# Patient Record
Sex: MALE | Race: WHITE | NOT HISPANIC OR LATINO | Employment: FULL TIME | URBAN - METROPOLITAN AREA
[De-identification: names, ages, dates, MRNs, and addresses within clinical notes are randomized per-mention and may not be internally consistent; named-entity substitution may affect disease eponyms.]

---

## 2022-04-11 ENCOUNTER — HISTORICAL (OUTPATIENT)
Dept: ADMINISTRATIVE | Facility: HOSPITAL | Age: 47
End: 2022-04-11

## 2022-04-24 VITALS
DIASTOLIC BLOOD PRESSURE: 91 MMHG | HEIGHT: 75 IN | SYSTOLIC BLOOD PRESSURE: 142 MMHG | WEIGHT: 229.5 LBS | BODY MASS INDEX: 28.54 KG/M2

## 2023-09-23 ENCOUNTER — HOSPITAL ENCOUNTER (INPATIENT)
Facility: HOSPITAL | Age: 48
LOS: 23 days | Discharge: HOME | DRG: 956 | End: 2023-10-16
Attending: STUDENT IN AN ORGANIZED HEALTH CARE EDUCATION/TRAINING PROGRAM | Admitting: SURGERY
Payer: MEDICAID

## 2023-09-23 DIAGNOSIS — R52 PAIN: ICD-10-CM

## 2023-09-23 DIAGNOSIS — M25.531 RIGHT WRIST PAIN: ICD-10-CM

## 2023-09-23 DIAGNOSIS — S72.031A CLOSED DISPLACED MIDCERVICAL FRACTURE OF RIGHT FEMUR, INITIAL ENCOUNTER: Primary | ICD-10-CM
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
## 2023-09-23 DIAGNOSIS — S92.134A CLOSED NONDISPLACED FRACTURE OF POSTERIOR PROCESS OF RIGHT TALUS, INITIAL ENCOUNTER: ICD-10-CM

## 2023-09-23 DIAGNOSIS — S72.91XA CLOSED FRACTURE OF RIGHT FEMUR, UNSPECIFIED FRACTURE MORPHOLOGY, UNSPECIFIED PORTION OF FEMUR, INITIAL ENCOUNTER: ICD-10-CM

## 2023-09-23 DIAGNOSIS — S92.001A CLOSED NONDISPLACED FRACTURE OF RIGHT CALCANEUS, UNSPECIFIED PORTION OF CALCANEUS, INITIAL ENCOUNTER: ICD-10-CM

## 2023-09-23 DIAGNOSIS — M48.061 SPINAL STENOSIS OF LUMBAR REGION, UNSPECIFIED WHETHER NEUROGENIC CLAUDICATION PRESENT: ICD-10-CM

## 2023-09-23 DIAGNOSIS — S09.90XA CLOSED HEAD INJURY, INITIAL ENCOUNTER: ICD-10-CM

## 2023-09-23 DIAGNOSIS — M25.551 RIGHT HIP PAIN: ICD-10-CM

## 2023-09-23 DIAGNOSIS — S06.4XAA EPIDURAL HEMATOMA: ICD-10-CM

## 2023-09-23 DIAGNOSIS — S72.001A CLOSED FRACTURE OF RIGHT HIP, INITIAL ENCOUNTER: ICD-10-CM

## 2023-09-23 DIAGNOSIS — T14.90XA TRAUMA: ICD-10-CM

## 2023-09-23 DIAGNOSIS — S92.121A DISPLACED FRACTURE OF BODY OF RIGHT TALUS, INITIAL ENCOUNTER FOR CLOSED FRACTURE: ICD-10-CM

## 2023-09-23 DIAGNOSIS — R10.84 GENERALIZED ABDOMINAL PAIN: ICD-10-CM

## 2023-09-23 DIAGNOSIS — S32.032A: ICD-10-CM

## 2023-09-23 DIAGNOSIS — M25.571 ACUTE RIGHT ANKLE PAIN: ICD-10-CM

## 2023-09-23 PROBLEM — S32.039A CLOSED FRACTURE OF THIRD LUMBAR VERTEBRA: Status: ACTIVE | Noted: 2023-09-23

## 2023-09-23 PROBLEM — S52.90XA RADIUS FRACTURE: Status: ACTIVE | Noted: 2023-09-23

## 2023-09-23 LAB
ALBUMIN SERPL-MCNC: 4.3 G/DL (ref 3.5–5)
ALBUMIN/GLOB SERPL: 1.9 RATIO (ref 1.1–2)
ALP SERPL-CCNC: 58 UNIT/L (ref 40–150)
ALT SERPL-CCNC: 33 UNIT/L (ref 0–55)
APTT PPP: 27.5 SECONDS (ref 23.2–33.7)
AST SERPL-CCNC: 50 UNIT/L (ref 5–34)
BASOPHILS # BLD AUTO: 0.04 X10(3)/MCL
BASOPHILS NFR BLD AUTO: 0.2 %
BILIRUB SERPL-MCNC: 0.8 MG/DL
BUN SERPL-MCNC: 9.5 MG/DL (ref 8.9–20.6)
CALCIUM SERPL-MCNC: 8.8 MG/DL (ref 8.4–10.2)
CHLORIDE SERPL-SCNC: 107 MMOL/L (ref 98–107)
CO2 SERPL-SCNC: 18 MMOL/L (ref 22–29)
CREAT SERPL-MCNC: 0.69 MG/DL (ref 0.73–1.18)
EOSINOPHIL # BLD AUTO: 0 X10(3)/MCL (ref 0–0.9)
EOSINOPHIL NFR BLD AUTO: 0 %
ERYTHROCYTE [DISTWIDTH] IN BLOOD BY AUTOMATED COUNT: 13.3 % (ref 11.5–17)
GFR SERPLBLD CREATININE-BSD FMLA CKD-EPI: >60 MLS/MIN/1.73/M2
GLOBULIN SER-MCNC: 2.3 GM/DL (ref 2.4–3.5)
GLUCOSE SERPL-MCNC: 83 MG/DL (ref 74–100)
GROUP & RH: NORMAL
HCT VFR BLD AUTO: 42.1 % (ref 42–52)
HGB BLD-MCNC: 14.5 G/DL (ref 14–18)
IMM GRANULOCYTES # BLD AUTO: 0.14 X10(3)/MCL (ref 0–0.04)
IMM GRANULOCYTES NFR BLD AUTO: 0.7 %
INDIRECT COOMBS GEL: NORMAL
INR PPP: 1
LACTATE SERPL-SCNC: 0.6 MMOL/L (ref 0.5–2.2)
LACTATE SERPL-SCNC: 0.7 MMOL/L (ref 0.5–2.2)
LACTATE SERPL-SCNC: 0.7 MMOL/L (ref 0.5–2.2)
LYMPHOCYTES # BLD AUTO: 1 X10(3)/MCL (ref 0.6–4.6)
LYMPHOCYTES NFR BLD AUTO: 5.3 %
MCH RBC QN AUTO: 30.4 PG (ref 27–31)
MCHC RBC AUTO-ENTMCNC: 34.4 G/DL (ref 33–36)
MCV RBC AUTO: 88.3 FL (ref 80–94)
MONOCYTES # BLD AUTO: 1.28 X10(3)/MCL (ref 0.1–1.3)
MONOCYTES NFR BLD AUTO: 6.7 %
NEUTROPHILS # BLD AUTO: 16.51 X10(3)/MCL (ref 2.1–9.2)
NEUTROPHILS NFR BLD AUTO: 87.1 %
NRBC BLD AUTO-RTO: 0 %
PLATELET # BLD AUTO: 243 X10(3)/MCL (ref 130–400)
PMV BLD AUTO: 10 FL (ref 7.4–10.4)
POTASSIUM SERPL-SCNC: 4 MMOL/L (ref 3.5–5.1)
PROT SERPL-MCNC: 6.6 GM/DL (ref 6.4–8.3)
PROTHROMBIN TIME: 13.4 SECONDS (ref 12.5–14.5)
RBC # BLD AUTO: 4.77 X10(6)/MCL (ref 4.7–6.1)
SODIUM SERPL-SCNC: 138 MMOL/L (ref 136–145)
SPECIMEN OUTDATE: NORMAL
WBC # SPEC AUTO: 18.97 X10(3)/MCL (ref 4.5–11.5)

## 2023-09-23 PROCEDURE — 63600175 PHARM REV CODE 636 W HCPCS: Performed by: SURGERY

## 2023-09-23 PROCEDURE — 25000003 PHARM REV CODE 250: Performed by: NURSE PRACTITIONER

## 2023-09-23 PROCEDURE — 86900 BLOOD TYPING SEROLOGIC ABO: CPT | Performed by: NEUROLOGICAL SURGERY

## 2023-09-23 PROCEDURE — 96374 THER/PROPH/DIAG INJ IV PUSH: CPT

## 2023-09-23 PROCEDURE — 20000000 HC ICU ROOM

## 2023-09-23 PROCEDURE — 63600175 PHARM REV CODE 636 W HCPCS

## 2023-09-23 PROCEDURE — 85610 PROTHROMBIN TIME: CPT | Performed by: NEUROLOGICAL SURGERY

## 2023-09-23 PROCEDURE — 83605 ASSAY OF LACTIC ACID: CPT | Performed by: NURSE PRACTITIONER

## 2023-09-23 PROCEDURE — 85025 COMPLETE CBC W/AUTO DIFF WBC: CPT | Performed by: STUDENT IN AN ORGANIZED HEALTH CARE EDUCATION/TRAINING PROGRAM

## 2023-09-23 PROCEDURE — 85730 THROMBOPLASTIN TIME PARTIAL: CPT | Performed by: NEUROLOGICAL SURGERY

## 2023-09-23 PROCEDURE — 25000003 PHARM REV CODE 250: Performed by: NEUROLOGICAL SURGERY

## 2023-09-23 PROCEDURE — 99291 CRITICAL CARE FIRST HOUR: CPT | Mod: ,,, | Performed by: SURGERY

## 2023-09-23 PROCEDURE — 99223 PR INITIAL HOSPITAL CARE,LEVL III: ICD-10-PCS | Mod: ,,, | Performed by: NEUROLOGICAL SURGERY

## 2023-09-23 PROCEDURE — 63600175 PHARM REV CODE 636 W HCPCS: Performed by: STUDENT IN AN ORGANIZED HEALTH CARE EDUCATION/TRAINING PROGRAM

## 2023-09-23 PROCEDURE — 25000003 PHARM REV CODE 250: Performed by: SURGERY

## 2023-09-23 PROCEDURE — 99223 1ST HOSP IP/OBS HIGH 75: CPT | Mod: ,,, | Performed by: NEUROLOGICAL SURGERY

## 2023-09-23 PROCEDURE — 80053 COMPREHEN METABOLIC PANEL: CPT | Performed by: STUDENT IN AN ORGANIZED HEALTH CARE EDUCATION/TRAINING PROGRAM

## 2023-09-23 PROCEDURE — 99291 CRITICAL CARE FIRST HOUR: CPT

## 2023-09-23 PROCEDURE — 99291 PR CRITICAL CARE, E/M 30-74 MINUTES: ICD-10-PCS | Mod: ,,, | Performed by: SURGERY

## 2023-09-23 PROCEDURE — 63600175 PHARM REV CODE 636 W HCPCS: Performed by: NURSE PRACTITIONER

## 2023-09-23 RX ORDER — MUPIROCIN 20 MG/G
OINTMENT TOPICAL 2 TIMES DAILY
Status: DISPENSED | OUTPATIENT
Start: 2023-09-23 | End: 2023-09-28

## 2023-09-23 RX ORDER — MORPHINE SULFATE 4 MG/ML
4 INJECTION, SOLUTION INTRAMUSCULAR; INTRAVENOUS EVERY 4 HOURS PRN
Status: DISCONTINUED | OUTPATIENT
Start: 2023-09-23 | End: 2023-09-23

## 2023-09-23 RX ORDER — METHOCARBAMOL 500 MG/1
500 TABLET, FILM COATED ORAL EVERY 8 HOURS
Status: DISCONTINUED | OUTPATIENT
Start: 2023-09-23 | End: 2023-09-23

## 2023-09-23 RX ORDER — METHOCARBAMOL 100 MG/ML
1000 INJECTION, SOLUTION INTRAMUSCULAR; INTRAVENOUS EVERY 8 HOURS
Status: DISCONTINUED | OUTPATIENT
Start: 2023-09-23 | End: 2023-09-23 | Stop reason: DRUGHIGH

## 2023-09-23 RX ORDER — OXYCODONE HYDROCHLORIDE 5 MG/1
5 TABLET ORAL EVERY 4 HOURS PRN
Status: DISCONTINUED | OUTPATIENT
Start: 2023-09-23 | End: 2023-09-23

## 2023-09-23 RX ORDER — ONDANSETRON 2 MG/ML
INJECTION INTRAMUSCULAR; INTRAVENOUS
Status: COMPLETED
Start: 2023-09-23 | End: 2023-09-23

## 2023-09-23 RX ORDER — MORPHINE SULFATE 4 MG/ML
2 INJECTION, SOLUTION INTRAMUSCULAR; INTRAVENOUS
Status: DISCONTINUED | OUTPATIENT
Start: 2023-09-23 | End: 2023-09-23

## 2023-09-23 RX ORDER — TALC
6 POWDER (GRAM) TOPICAL NIGHTLY PRN
Status: DISCONTINUED | OUTPATIENT
Start: 2023-09-23 | End: 2023-10-02

## 2023-09-23 RX ORDER — BISACODYL 10 MG
10 SUPPOSITORY, RECTAL RECTAL DAILY PRN
Status: DISCONTINUED | OUTPATIENT
Start: 2023-09-23 | End: 2023-10-01

## 2023-09-23 RX ORDER — FERROUS SULFATE, DRIED 160(50) MG
1 TABLET, EXTENDED RELEASE ORAL 2 TIMES DAILY
Status: DISCONTINUED | OUTPATIENT
Start: 2023-09-23 | End: 2023-10-02

## 2023-09-23 RX ORDER — METHOCARBAMOL 100 MG/ML
500 INJECTION, SOLUTION INTRAMUSCULAR; INTRAVENOUS EVERY 8 HOURS
Status: DISCONTINUED | OUTPATIENT
Start: 2023-09-23 | End: 2023-09-25

## 2023-09-23 RX ORDER — DOCUSATE SODIUM 100 MG/1
100 CAPSULE, LIQUID FILLED ORAL 2 TIMES DAILY
Status: DISCONTINUED | OUTPATIENT
Start: 2023-09-23 | End: 2023-09-28

## 2023-09-23 RX ORDER — DEXTROAMPHETAMINE SACCHARATE, AMPHETAMINE ASPARTATE, DEXTROAMPHETAMINE SULFATE AND AMPHETAMINE SULFATE 7.5; 7.5; 7.5; 7.5 MG/1; MG/1; MG/1; MG/1
1 TABLET ORAL DAILY
COMMUNITY
End: 2023-11-09

## 2023-09-23 RX ORDER — PANTOPRAZOLE SODIUM 40 MG/1
1 TABLET, DELAYED RELEASE ORAL DAILY
COMMUNITY
End: 2023-11-09

## 2023-09-23 RX ORDER — ACETAMINOPHEN 325 MG/1
650 TABLET ORAL EVERY 4 HOURS
Status: DISPENSED | OUTPATIENT
Start: 2023-09-23 | End: 2023-09-28

## 2023-09-23 RX ORDER — MORPHINE SULFATE 4 MG/ML
4 INJECTION, SOLUTION INTRAMUSCULAR; INTRAVENOUS
Status: DISCONTINUED | OUTPATIENT
Start: 2023-09-23 | End: 2023-09-28

## 2023-09-23 RX ORDER — MORPHINE SULFATE 4 MG/ML
INJECTION, SOLUTION INTRAMUSCULAR; INTRAVENOUS
Status: COMPLETED
Start: 2023-09-23 | End: 2023-09-23

## 2023-09-23 RX ORDER — FAMOTIDINE 20 MG/1
20 TABLET, FILM COATED ORAL 2 TIMES DAILY
Status: DISCONTINUED | OUTPATIENT
Start: 2023-09-23 | End: 2023-09-24

## 2023-09-23 RX ORDER — OXYCODONE HYDROCHLORIDE 10 MG/1
10 TABLET ORAL EVERY 4 HOURS PRN
Status: DISCONTINUED | OUTPATIENT
Start: 2023-09-23 | End: 2023-10-02

## 2023-09-23 RX ORDER — POLYETHYLENE GLYCOL 3350 17 G/17G
17 POWDER, FOR SOLUTION ORAL 2 TIMES DAILY
Status: DISCONTINUED | OUTPATIENT
Start: 2023-09-23 | End: 2023-09-28

## 2023-09-23 RX ORDER — HYDROMORPHONE HYDROCHLORIDE 2 MG/ML
1 INJECTION, SOLUTION INTRAMUSCULAR; INTRAVENOUS; SUBCUTANEOUS
Status: COMPLETED | OUTPATIENT
Start: 2023-09-23 | End: 2023-09-23

## 2023-09-23 RX ORDER — SODIUM CHLORIDE 9 MG/ML
INJECTION, SOLUTION INTRAVENOUS CONTINUOUS
Status: DISCONTINUED | OUTPATIENT
Start: 2023-09-23 | End: 2023-09-24

## 2023-09-23 RX ORDER — MORPHINE SULFATE 4 MG/ML
4 INJECTION, SOLUTION INTRAMUSCULAR; INTRAVENOUS
Status: DISCONTINUED | OUTPATIENT
Start: 2023-09-23 | End: 2023-09-23

## 2023-09-23 RX ADMIN — DOCUSATE SODIUM 100 MG: 100 CAPSULE, LIQUID FILLED ORAL at 08:09

## 2023-09-23 RX ADMIN — ONDANSETRON 4 MG: 2 INJECTION INTRAMUSCULAR; INTRAVENOUS at 11:09

## 2023-09-23 RX ADMIN — SODIUM CHLORIDE: 9 INJECTION, SOLUTION INTRAVENOUS at 03:09

## 2023-09-23 RX ADMIN — OXYCODONE HYDROCHLORIDE 10 MG: 10 TABLET ORAL at 08:09

## 2023-09-23 RX ADMIN — MORPHINE SULFATE 4 MG: 4 INJECTION INTRAVENOUS at 11:09

## 2023-09-23 RX ADMIN — METHOCARBAMOL 1000 MG: 100 INJECTION INTRAMUSCULAR; INTRAVENOUS at 03:09

## 2023-09-23 RX ADMIN — MORPHINE SULFATE 4 MG: 4 INJECTION, SOLUTION INTRAMUSCULAR; INTRAVENOUS at 07:09

## 2023-09-23 RX ADMIN — METHOCARBAMOL 500 MG: 100 INJECTION INTRAMUSCULAR; INTRAVENOUS at 07:09

## 2023-09-23 RX ADMIN — Medication 1 TABLET: at 08:09

## 2023-09-23 RX ADMIN — FAMOTIDINE 20 MG: 20 TABLET, FILM COATED ORAL at 08:09

## 2023-09-23 RX ADMIN — POLYETHYLENE GLYCOL 3350 17 G: 17 POWDER, FOR SOLUTION ORAL at 08:09

## 2023-09-23 RX ADMIN — SODIUM CHLORIDE: 9 INJECTION, SOLUTION INTRAVENOUS at 04:09

## 2023-09-23 RX ADMIN — MORPHINE SULFATE 4 MG: 4 INJECTION INTRAVENOUS at 03:09

## 2023-09-23 RX ADMIN — HYDROMORPHONE HYDROCHLORIDE 1 MG: 2 INJECTION, SOLUTION INTRAMUSCULAR; INTRAVENOUS; SUBCUTANEOUS at 12:09

## 2023-09-23 RX ADMIN — OXYCODONE HYDROCHLORIDE 5 MG: 5 TABLET ORAL at 04:09

## 2023-09-23 RX ADMIN — MORPHINE SULFATE 4 MG: 4 INJECTION, SOLUTION INTRAMUSCULAR; INTRAVENOUS at 09:09

## 2023-09-23 RX ADMIN — ACETAMINOPHEN 650 MG: 325 TABLET, FILM COATED ORAL at 09:09

## 2023-09-23 RX ADMIN — ACETAMINOPHEN 650 MG: 325 TABLET, FILM COATED ORAL at 05:09

## 2023-09-23 RX ADMIN — MORPHINE SULFATE 4 MG: 4 INJECTION, SOLUTION INTRAMUSCULAR; INTRAVENOUS at 05:09

## 2023-09-23 RX ADMIN — Medication 6 MG: at 08:09

## 2023-09-23 RX ADMIN — MUPIROCIN: 20 OINTMENT TOPICAL at 08:09
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
## 2023-09-23 NOTE — ED PROVIDER NOTES
Encounter Date: 9/23/2023    SCRIBE #1 NOTE: I, Bj Dickens, am scribing for, and in the presence of,  Dr. Victor. I have scribed the following portions of the note - Other sections scribed: HPI, ROS, Physical Exam, MDM, Attending.       History   No chief complaint on file.    46 y/o male presents to ED via EMS as transfer from [facility] following fall from [facility] at about 12-15 feet directly onto his legs.  Pt complains of R hip pain, R leg pain, back pain and R wrist pain.  He also reports some numbness to his R wrist.  Pt had imaging at previous facility showing fractures in his R leg, hip and back.  He is not on thinners and denies weakness, abdominal pain or chest pain.     The history is provided by the patient and medical records.     Review of patient's allergies indicates:  Not on File  No past medical history on file.  No past surgical history on file.  No family history on file.     Review of Systems   Cardiovascular:  Negative for chest pain.   Gastrointestinal:  Negative for abdominal pain.   Musculoskeletal:  Positive for arthralgias (R wrist; R hip), back pain and myalgias (R leg).   Neurological:  Positive for numbness (R wrist). Negative for weakness.       Physical Exam     Initial Vitals [09/23/23 1056]   BP Pulse Resp Temp SpO2   (!) 121/91 81 18 98.1 °F (36.7 °C) 100 %      MAP       --         Physical Exam    Nursing note and vitals reviewed.  Constitutional: He appears well-developed. He is not diaphoretic. No distress.   HENT:   Head: Normocephalic and atraumatic.   Nose: Nose normal.   Mouth/Throat: Oropharynx is clear and moist.   Eyes: Conjunctivae and EOM are normal. Pupils are equal, round, and reactive to light.   Cardiovascular:  Normal rate and regular rhythm.           No murmur heard.  Pulmonary/Chest: Breath sounds normal. No respiratory distress. He has no wheezes. He has no rales.   Abdominal: Abdomen is soft. He exhibits no distension. There is no abdominal  tenderness.   Musculoskeletal:         General: Tenderness (R foot (diffuse); R hip; R wrist) present.     Neurological: He is alert and oriented to person, place, and time. He has normal strength. No cranial nerve deficit. GCS score is 15. GCS eye subscore is 4. GCS verbal subscore is 5. GCS motor subscore is 6.   Intact sensation to bilateral LE; moves all extremities    Skin: Skin is warm. Capillary refill takes less than 2 seconds. No rash noted.         ED Course   Critical Care    Date/Time: 9/24/2023 6:22 AM    Performed by: Ryland Victor MD  Authorized by: Ryland Victor MD  Direct patient critical care time: 35 minutes  Total critical care time (exclusive of procedural time) : 35 minutes  Critical care time was exclusive of separately billable procedures and treating other patients.  Critical care was necessary to treat or prevent imminent or life-threatening deterioration of the following conditions: trauma.  Critical care was time spent personally by me on the following activities: development of treatment plan with patient or surrogate, discussions with consultants, evaluation of patient's response to treatment, examination of patient, obtaining history from patient or surrogate, ordering and performing treatments and interventions, ordering and review of laboratory studies, ordering and review of radiographic studies, pulse oximetry, re-evaluation of patient's condition and review of old charts.        Labs Reviewed   COMPREHENSIVE METABOLIC PANEL - Abnormal; Notable for the following components:       Result Value    Carbon Dioxide 18 (*)     Creatinine 0.69 (*)     Globulin 2.3 (*)     Aspartate Aminotransferase 50 (*)     All other components within normal limits   CBC WITH DIFFERENTIAL - Abnormal; Notable for the following components:    WBC 18.97 (*)     Neut # 16.51 (*)     IG# 0.14 (*)     All other components within normal limits   LACTIC ACID, PLASMA - Normal   PROTIME-INR -  Normal   APTT - Normal   CBC W/ AUTO DIFFERENTIAL    Narrative:     The following orders were created for panel order CBC auto differential.  Procedure                               Abnormality         Status                     ---------                               -----------         ------                     CBC with Differential[[id number]]       Abnormal            Final result                 Please view results for these tests on the individual orders.          Imaging Results              MRI Lumbar Spine Without Contrast (Final result)  Result time 09/23/23 15:40:35      Final result by Desmond Villa MD (09/23/23 15:40:35)                   Impression:      1. L3 vertebral body superior half acute compression deformity with paraspinal soft inflammations.  Combination retropulsed bony fragment and ventral epidural hematoma results in central canal stenosis.    2. Lumbar degenerative disc disease and spondylosis level by level discussed above.      Electronically signed by: Desmond Villa  Date:    09/23/2023  Time:    15:40               Narrative:    EXAMINATION:  MRI LUMBAR SPINE WITHOUT CONTRAST    TECHNIQUE:  Spine fracture, lumbar, traumatic;    COMPARISON:  Outside facility CT abdomen pelvis September 23, 2023.    FINDINGS:  There is acute compression deformity of L3 vertebral body along the superior endplate which involves the anterior and the middle column.  This results in about 20% loss of stature.  Hyperintense edematous signal is within the superior half of the vertebral body on the STIR sequence.  There are anterolateral displaced bony fragments and anterolateral paraspinal soft tissue inflammation.  Along the superior endplate of L3, there is broad retropulsed bony fragment into the spinal canal with AP diameter of 8 mm and transverse diameter of 2.0 mm. Inferior to the retropulsed bony fragment is epidural hematoma on axial image 25 series 18 and the sagittal image 9 series 13.   Combination of retropulsed bony fragment and the ventral epidural hematoma results in central canal stenosis with maximum residual AP diameter of 5.8 mm compared to at the level of L2 with 12.9 mm AP diameter of the thecal sac.  Otherwise, lumbar vertebrae stature and alignment is preserved.  Small hemangioma involves L2 vertebral body.    The visualized thoracic cord is unremarkable. The conus medullaris terminates at L1.  Disc segmental analysis is given below:    At L1-L2, there is bulging of annulus fibrosis slightly flattens the ventral thecal sac.  Central canal is not stenosed.  There are no narrowings of the neural foramen.    At L2-L3, there is disc bulge with more pronounced left paracentral portion causing ventral thecal sac compression.  There is also some facet arthropathy.  Central canal stenosis is moderate.  There are no significant narrowings of the neural foramen.    At L3-L4, there is disc bulge which slightly flattens the ventral thecal sac.  Bilateral facet arthropathy.  Central canal stenosis is mild.  There are no narrowings of the neural foramen.    At L4-L5, the disc is desiccated with central annular fissure.  Disc compresses the ventral thecal sac.    Bilateral degenerative hypertrophy of the facet joints and ligamentum flavum thickening.  These findings result in moderate central canal stenosis.  There are no narrowings of the neural foramen.    At L5-S1, there is no disc herniation.  Bilateral facet arthropathy.  Central canal is not stenosed.  There are no narrowings of the neural foramen                                       MRI Thoracic Spine Without Contrast (Final result)  Result time 09/23/23 15:33:05      Final result by Jose Antonio Mak MD (09/23/23 15:33:05)                   Impression:      No evidence for acute osseous finding or static listhesis    No significant herniation or bulge.  No canal, cord, or foraminal compromise    Mild thoracic spondylosis.      Electronically  signed by: Jose Antonio Mak MD  Date:    09/23/2023  Time:    15:33               Narrative:    EXAMINATION:  MRI thoracic spine without contrast    CLINICAL HISTORY:  Mid back pain, injury    TECHNIQUE:  Multiplanar, multisequence MR imaging of the thoracic spine was performed without contrast    COMPARISON:  None    FINDINGS:  There is normal thoracic kyphosis.  The vertebral body heights are maintained.  There are multiple Schmorl's node extending to the endplates from the mid to lower thoracic spine.    The visualized cord is normal in size and signal.  There is mild thoracic dextrocurvature noted.    There is multilevel disc desiccation.  The paravertebral soft tissues appear unremarkable.  There is multilevel mild facet arthrosis.  There is no significant herniation or bulge.  There is no central canal, cord, or foraminal compromise                                       CT Ankle (Including Hindfoot) Without Contrast Right (Final result)  Result time 09/23/23 14:21:24      Final result by Jose Antonio Mak MD (09/23/23 14:21:24)                   Impression:      Calcaneus fracture as described above.      Electronically signed by: Jose Antonio Mak MD  Date:    09/23/2023  Time:    14:21               Narrative:    EXAMINATION:  CT of the right ankle    CLINICAL HISTORY:  Fracture    COMPARISON:  None.  Correlation with same day radiograph.    TECHNIQUE:  Routine CT of the right hand: Foot was performed without contrast    Total DLP: 109 mGy.cm    Automatic exposure control was utilized to reduce the patient's dose    FINDINGS:  There is comminuted  mildly displaced right calcaneal fracture noted.  Fracture lines involve the posterior calcaneal tuberosity, calcaneal body, and anterior process of the calcaneus.  There is fracture of the line extending to involve the posterior aspect of the posterior subtalar joint.  Middle subtalar joint appears non involved.  Calcaneocuboid joint is not involved.  There are  moderate degenerative changes of the 1st MTP joint                                       X-Ray Hand 3 view Left (Final result)  Result time 09/23/23 13:53:45   Procedure changed from X-Ray Hand 2 View Left     Final result by Desmond Villa MD (09/23/23 13:53:45)                   Impression:      No acute osseous abnormality identified.      Electronically signed by: Desmond Villa  Date:    09/23/2023  Time:    13:53               Narrative:    EXAMINATION:  XR HAND COMPLETE 3 VIEW LEFT    CLINICAL HISTORY:  trauma;    TECHNIQUE:  Three views.    COMPARISON:  Trauma.    FINDINGS:  Osseous and articular surfaces are unremarkable.  There is no acute fracture, dislocation or arthritic change.  Position and alignment is unremarkable.  There is unremarkable mineralization of the bones.  No soft tissue calcifications identified.                                       X-Ray Knee Complete 4 or More Views Left (Final result)  Result time 09/23/23 13:51:33   Procedure changed from X-Ray Knee 1 or 2 View Left     Final result by Desmond Villa MD (09/23/23 13:51:33)                   Impression:      No acute osseous abnormality identified.      Electronically signed by: Desmond Villa  Date:    09/23/2023  Time:    13:51               Narrative:    EXAMINATION:  XR KNEE COMP 4 OR MORE VIEWS LEFT    CLINICAL HISTORY:  trauma;    TECHNIQUE:  Four views    COMPARISON:  None available.    FINDINGS:  The osseous and articular surfaces are unremarkable.  There is no acute fracture, dislocation or arthritic change.  Position and alignment are satisfactory.  There is unremarkable mineralization of the bones.  No soft calcifications identified.                                       X-Ray Pelvis Routine AP (Final result)  Result time 09/23/23 12:31:26      Final result by Jose Antonio Mak MD (09/23/23 12:31:26)                   Impression:      Right subcapital femoral neck fracture.      Electronically signed by: Jose Antonio Mak  MD  Date:    09/23/2023  Time:    12:31               Narrative:    EXAMINATION:  Pelvis one view    CLINICAL HISTORY:  Fall, injury, pain    COMPARISON:  None    FINDINGS:  There is minimally displaced right subcapital femoral neck fracture noted.  No other fracture seen.                                       X-Ray Femur Ap/Lat Right (Final result)  Result time 09/23/23 12:27:48      Final result by Jose Antonio Mak MD (09/23/23 12:27:48)                   Impression:      Right subcapital femoral neck fracture.      Electronically signed by: Jose Antonio Mak MD  Date:    09/23/2023  Time:    12:27               Narrative:    EXAMINATION:  Right femur two views    CLINICAL HISTORY:  Fall, injury    COMPARISON:  None    FINDINGS:  There is minimally displaced right subcapital femoral neck fracture noted.  Femoroacetabular joint remains in alignment.                                       X-Ray Wrist Complete Right (Final result)  Result time 09/23/23 11:45:20      Final result by Jose Antonio Mak MD (09/23/23 11:45:20)                   Impression:      Mildly displaced dorsal triquetral fracture.    Nondisplaced distal radius fracture.      Electronically signed by: Jose Antonio Mak MD  Date:    09/23/2023  Time:    11:45               Narrative:    EXAMINATION:  Right wrist three views    CLINICAL HISTORY:  Fall, injury, pain    COMPARISON:  None    FINDINGS:  Nondisplaced fracture of the distal radius is noted.  There is a minimally displaced fracture of the dorsal to the triquetrum.                                       X-Ray Ankle Complete Right (Final result)  Result time 09/23/23 11:45:03      Final result by Desmond Villa MD (09/23/23 11:45:03)                   Impression:      Fractured calcaneus.      Electronically signed by: Desmond Villa  Date:    09/23/2023  Time:    11:45               Narrative:    EXAMINATION:  XR ANKLE COMPLETE 3 VIEW RIGHT    CLINICAL HISTORY:  Injury, unspecified, initial  encounter    TECHNIQUE:  Three views    COMPARISON:  None available.    FINDINGS:  There are no fractures of the malleoli and the ankle mortise is intact.  Fracture lines involve the posterior 3rd of the calcaneus.  No definite distortion of the subtalar joint.                                       Medications   0.9%  NaCl infusion ( Intravenous Verify Only 9/24/23 0533)   acetaminophen tablet 650 mg (650 mg Oral Given 9/24/23 0531)   famotidine tablet 20 mg (20 mg Oral Given 9/23/23 2032)   melatonin tablet 6 mg (6 mg Oral Given 9/23/23 2043)   polyethylene glycol packet 17 g (17 g Oral Given 9/23/23 2031)   docusate sodium capsule 100 mg (100 mg Oral Given 9/23/23 2032)   bisacodyL suppository 10 mg (has no administration in time range)   calcium-vitamin D3 500 mg-5 mcg (200 unit) per tablet 1 tablet (1 tablet Oral Given 9/23/23 2032)   morphine injection 4 mg (4 mg Intravenous Given 9/24/23 0432)   methocarbamoL injection 500 mg (500 mg Intravenous Given 9/24/23 0531)   mupirocin 2 % ointment ( Nasal Given 9/23/23 2032)   oxyCODONE immediate release tablet Tab 10 mg (10 mg Oral Given 9/24/23 0117)   morphine 4 mg/mL injection (4 mg  Given 9/23/23 1111)   ondansetron 4 mg/2 mL injection (4 mg  Given 9/23/23 1111)   HYDROmorphone (PF) injection 1 mg (1 mg Intravenous Given 9/23/23 1247)     Medical Decision Making  Amount and/or Complexity of Data Reviewed  Labs: ordered.  Radiology: ordered.    Risk  Prescription drug management.  Decision regarding hospitalization.    Differential diagnosis (includes but is not limited to):   Spinal fracture, fracture, dislocation, thoracic trauma, abdominal trauma    MDM Narrative  47-year-old male arrives as a transfer from outside hospital as a trauma transfer for multiple traumatic injuries.  Patient was reportedly sleep walking on a balcony when he accidentally walked off of the balcony and fell approximately 12-15 feet.  He arrives with multiple traumatic injuries including  a right femoral subcapital neck fracture, right calcaneal fracture, right talus fracture, right distal radius fracture, and right ulnar styloid fracture.  He also was found to have sustained an L3 burst fracture with retropulsion.  He has intact movement and sensation to his lower extremities, no saddle anesthesia.  Outside hospital imaging and labs have been reviewed.  Orthopedic surgery consulted, appreciate recommendations.  Neurosurgery has been consulted and recommends an MRI of the thoracic and lumbar spine which have been ordered.  Patient arrives with no splint to the right lower extremity or right upper extremity, these will be applied after repeat imaging obtained.  Continue pain and nausea control, continue narcotic pain control as needed.  Telemetry monitor independently reviewed and shows a sinus rhythm with heart rate in the 60s.  Patient will be admitted to the trauma service once the workup is complete.    Update:  Repeat x-rays reviewed.  Orthopedic surgery has reviewed the images and recommends NPO at midnight and plan for operative fixation tomorrow.  Neurosurgery is tentatively planning for operative fixation of the patient's spinal fracture later this week as well.  Overall, given the patient's multiple traumatic injuries and unstable L3 burst fracture, patient will require admission to the trauma ICU.  Trauma service has admitted the patient.  Patient's family members who are present at the bedside and the patient has been updated on the plan of care and have no questions at this time.  They are all in agreement with the plan for admission.    Dispo:  Admit    My independent radiology interpretation:  As above  Point of care US (independently performed and interpreted):   Decision rules/clinical scoring:     Sepsis Perfusion Assessment:     Amount and/or Complexity of Data Reviewed  Independent historian: EMS   Summary of history:  Report received from EMS on arrival including chief complaint,  vital signs, medications  External data reviewed: notes from previous admissions, notes from previous ED visits, and notes from clinic visits  Summary of data reviewed:  Prior records reviewed.  Transfer records reviewed, see ED course.  Risk and benefits of testing: discussed   Labs: ordered and reviewed  Radiology: ordered and independent interpretation performed (see above or ED course)  ECG/medicine tests: ordered and independent interpretation performed (see above or ED course)  Discussion of management or test interpretation with external provider(s): discussed with Trauma surgery, Neurosurgery, orthopedic surgery consultant   Summary of discussion:  As above    Risk  Parenteral controlled substances   Drug therapy requiring intense monitoring for toxicity   Decision regarding hospitalization  Shared decision making     Critical Care  30-74 minutes     Data Reviewed/Counseling: I have personally reviewed the patient's vital signs, nursing notes, and other relevant tests, information, and imaging. I had a detailed discussion regarding the historical points, exam findings, and any diagnostic results supporting the discharge diagnosis. I personally performed the history, PE, MDM and procedures as documented above and agree with the scribe's documentation.    Portions of this note were dictated using voice recognition software. Although it was reviewed for accuracy, some inherent voice recognition errors may have occurred and may be present in this document.          Scribe Attestation:   Scribe #1: I performed the above scribed service and the documentation accurately describes the services I performed. I attest to the accuracy of the note.    Attending Attestation:           Physician Attestation for Scribe:  Physician Attestation Statement for Scribe #1: I, reviewed documentation, as scribed by Bj Dickens in my presence, and it is both accurate and complete.             ED Course as of 09/24/23 0630   Sat Sep  23, 2023   1114 Neurosurgery consulted regarding L3 fracture, recommends MRI T and L spine and will evaluate the patient. [MC]   1130 X-Ray Ankle Complete Right  Independently visualized/reviewed by me during the ED visit.  - Calcaneus fx [MC]   1130 X-Ray Wrist Complete Right  Independently visualized/reviewed by me during the ED visit.  - Distal radius fx [MC]   1130 X-Ray Femur Ap/Lat Right  Independently visualized/reviewed by me during the ED visit.  - Femoral neck fx [MC]   1130 X-Ray Pelvis Routine AP  Independently visualized/reviewed by me during the ED visit.  -  Right femoral fx [MC]   1141 Transfer records reviewed:  Patient presented to the outside hospital after a fall from a balcony of approximately 12-15 feet.  He landed on his right leg and back.  Outside hospital imaging reviewed and reveals a right impacted subcapital hip fracture; negative head CT; negative CT chest; burst fracture of L3 with retropulsion; negative CT of the cervical spine; comminuted right calcaneal fracture, minimally displaced posterior talus fracture; minimally displaced ulnar styloid fracture with soft tissue swelling over the dorsum of the hand. [MC]   1205 Orthopedic surgery consulted and will review the images, recommends NPO at midnight. [MC]   1233 Trauma will admit [MC]   1300 X-Ray Knee Complete 4 or More Views Left  Independently visualized/reviewed by me during the ED visit.  - No acute fx or dislocation [MC]   1300 X-Ray Hand 3 view Left  Independently visualized/reviewed by me during the ED visit.  - No fx or dislocation [MC]      ED Course User Index  [MC] Ryland Victor MD                    Clinical Impression:   Final diagnoses:  [R52] Pain  [T14.90XA] Trauma  [W19.XXXA] Fall, initial encounter (Primary)  [S09.90XA] Closed head injury, initial encounter  [M25.531] Right wrist pain  [M25.551] Right hip pain  [M25.571] Acute right ankle pain  [S72.91XA] Closed fracture of right femur, unspecified fracture  morphology, unspecified portion of femur, initial encounter  [S92.134A] Closed nondisplaced fracture of posterior process of right talus, initial encounter  [S92.001A] Closed nondisplaced fracture of right calcaneus, unspecified portion of calcaneus, initial encounter        ED Disposition Condition    Admit Stable                Ryland Victor MD  09/24/23 3041

## 2023-09-23 NOTE — CONSULTS
Full consultation note per the critical Care resident pending at this time    I personally evaluated the patient and performed physical exam in addition to discussion with the patient's family on the afternoon of 09/23/2023.  Patient apparently was sleep walking and slipped off his balcony falling roughly 13 ft suffering polytrauma with sustained right completely displaced femoral neck fracture, burst fracture of L3 with retropulsion, central cord stenosis, comminuted fracture of the calcaneus on the right with minimally displaced fracture of the talus.  The patient was splinted in the emergency department and evaluated by Orthopedic surgery and Neurosurgery.  Patient has plans to go to the OR tomorrow with the orthopedic surgery for repair of the fractures in the right upper extremity and right lower extremity with plans for repair of the spinal fracture next Wednesday in addition to the hip fracture.  At this time patient is complaining of pain in the right leg and right arm but otherwise is breathing with minimal supplemental oxygen via nasal cannula.  He denies any decrease in sensation in any of his extremities.  At this time clear to auscultation bilaterally no wheezes rales rhonchi regular rate and rhythm no murmurs rubs gallops.  Abdomen soft nontender bowel sounds hypoactive.  No upper or lower extremity edema.  Pulses intact all extremities warm to touch.  Continue current level of care in the trauma ICU and address pain as appropriate.  Labs indicate leukocytosis of 18.9 K with a mild metabolic alkalosis with bicarbonate of 18.  Lactate is normal.  DVT prophylaxis as appropriate.

## 2023-09-23 NOTE — HPI
47-year-old male presents to the hospital as a trauma transfer.  By report he fell or slept wall fall from a balcony of approximally 12th and 13th feet.  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  GCS 15.  Family at bedside.  Denies any past medical history.  Pain is moderately controlled.  He has a sugar-tong splint to the right upper extremity and a short-leg splint to the right lower extremity.  His vital signs are stable.  Orthopedics and neurosurgery aware of the patient.  Both believe these may be operative  injuries pending further evaluation.  He was MRIs that are pending as well.

## 2023-09-23 NOTE — ASSESSMENT & PLAN NOTE
Admitted to the ICU of the recommendation of the neurosurgeon  MRI per Neurosurgery   Q.1h neurovascular checks  Follow up orthopedic recommendations  Add left elbow left knee x-rays for pain  Multimodal pain control   Bed rest   Logroll   IV fluids

## 2023-09-23 NOTE — H&P (VIEW-ONLY)
[facility] - Emergency Dept  Neurosurgery  Consult Note    Inpatient consult to Neurosurgery  Consult performed by: Yaima Jeter AGACNP-BC  Consult ordered by: Ryland Victor MD        Subjective:     Chief Complaint/Reason for Admission:  Status post fall.    History of Present Illness:  This is a 47-year-old  male presented to the hospital as a trauma transfer.  Patient reported he was either sleep walking or unaware that he fell from a balcony approximately 12-13 feet from a 2nd story.  Patient stated he fell onto his legs.  He does not know why he stated he just walked off the balcony.  Multi-trauma found to have right hip fracture, burst fracture of L3 with retropulsion, 13 mm lytic lesion with sclerotic rim and thin zone of transition to the left iliac bone which is an incidental finding, comminuted fracture of the calcaneus with minimal displaced fracture of the talus.  GCS has remained 15.  Family at bedside.  No past medical history.  Not on any blood thinners.  Pain controlled with some breakthrough at times.  Orthopedics and Neurosurgery both consulted.  Trauma is attending.  MRIs have been ordered.        (Not in a hospital admission)      Review of patient's allergies indicates:  Not on File    No past medical history on file.  No past surgical history on file.  Family History    None       Tobacco Use    Smoking status: Not on file    Smokeless tobacco: Not on file   Substance and Sexual Activity    Alcohol use: Not on file    Drug use: Not on file    Sexual activity: Not on file     Review of Systems   Musculoskeletal:  Positive for back pain and gait problem.        Pain to orthopedic injury to right upper extremity, right lower extremity.  Back pain.     Objective:     Weight: 93.9 kg (207 lb)  Body mass index is 26.58 kg/m².  Vital Signs (Most Recent):  Temp: 98.1 °F (36.7 °C) (09/23/23 1056)  Pulse: 80 (09/23/23 1301)  Resp: 20 (09/23/23 1301)  BP: (!) 158/97  (09/23/23 1301)  SpO2: (!) 94 % (09/23/23 1301) Vital Signs (24h Range):  Temp:  [98.1 °F (36.7 °C)] 98.1 °F (36.7 °C)  Pulse:  [80-91] 80  Resp:  [16-24] 20  SpO2:  [94 %-100 %] 94 %  BP: (121-158)/() 158/97                              Physical Exam:  Nursing note and vitals reviewed.    Constitutional: He appears well-developed and well-nourished.     Eyes: Pupils are equal, round, and reactive to light. Conjunctivae and EOM are normal.     Cardiovascular: Normal rate.     Abdominal: Soft. Bowel sounds are normal.     Skin: Skin displays no rash on trunk and no rash on extremities. Skin displays no lesions on trunk and no lesions on extremities.     Psych/Behavior: He is alert. He is oriented to person, place, and time. He has a normal mood and affect.     Musculoskeletal:        Right Upper Extremities: Range of motion is limited. There is tenderness.        Left Upper Extremities: There is tenderness. Tenderness is located Wrist. Muscle strength is 5/5.       Right Lower Extremities: Range of motion is limited. There is tenderness.        Left Lower Extremities: Muscle strength is 5/5.      Comments: Patient with splint to right upper extremity, right lower extremity.  Able to wiggle fingers and toes on the right.  He does have intact sensation to all extremities.    Left upper and lower extremity 5/5 sensation intact.  Pain limited due to orthopedic injuries.     Neurological:        Sensory: There is no sensory deficit in the trunk. There is no sensory deficit in the extremities.        DTRs: DTRs are DTRS NORMAL AND SYMMETRICnormal and symmetric. He displays no Babinski's sign on the right side. He displays no Babinski's sign on the left side.        Cranial nerves: Cranial nerve(s) II, III, IV, V, VI, VII, VIII, IX, X, XI and XII are intact.   GCS 15   PERRLA bilateral brisk.    Speech is clear   No facial droop   Cranial nerves grossly intact  Conversant and follows commands.       Significant  "Labs:  Recent Labs   Lab 09/23/23  1123      K 4.0   CO2 18*   BUN 9.5   CREATININE 0.69*   CALCIUM 8.8     Recent Labs   Lab 09/23/23  1123   WBC 18.97*   HGB 14.5   HCT 42.1        No results for input(s): "LABPT", "INR", "APTT" in the last 48 hours.  Microbiology Results (last 7 days)       ** No results found for the last 168 hours. **            Assessment/Plan:    Awaiting completion of MRIs of spine.    Nursing relates that MRI is here to do their standard checklist prior to imaging so this should take place soon.    TLSO brace via orthotics consult.    Bed rest, log-rolling   Pain control  SCDs   Frequent neuro/neuromuscular exams   Tentative plan is to repair L3 fracture on Tuesday 09/26/2023.  Awaiting orthopedic plan for repair of pelvis for coordination and timing of surgery.       Active Diagnoses:    Diagnosis Date Noted POA    Closed fracture of third lumbar vertebra [S32.039A] 09/23/2023 Yes    Radius fracture [S52.90XA] 09/23/2023 Yes    Closed right hip fracture [S72.001A] 09/23/2023 Yes    Displaced fracture of body of right talus, initial encounter for closed fracture [S92.121A] 09/23/2023 Yes      Problems Resolved During this Admission:       Thank you for your consult. I will follow-up with patient. Please contact us if you have any additional questions.    Yaima Jeter, [facility]-BC  Neurosurgery  [facility] - Emergency Dept    "

## 2023-09-23 NOTE — SUBJECTIVE & OBJECTIVE
No current facility-administered medications on file prior to encounter.     Current Outpatient Medications on File Prior to Encounter   Medication Sig    dextroamphetamine-amphetamine 30 mg Tab Take 1 tablet by mouth once daily.    pantoprazole (PROTONIX) 40 MG tablet Take 1 tablet by mouth once daily.       Review of patient's allergies indicates:  Not on File    No past medical history on file.  No past surgical history on file.  Family History    None       Tobacco Use    Smoking status: Not on file    Smokeless tobacco: Not on file   Substance and Sexual Activity    Alcohol use: Not on file    Drug use: Not on file    Sexual activity: Not on file     Review of Systems   Constitutional:  Negative for chills and fever.   HENT:  Negative for ear pain and trouble swallowing.    Eyes:  Negative for pain and redness.   Respiratory:  Negative for cough and chest tightness.    Cardiovascular:  Negative for chest pain, palpitations and leg swelling.   Gastrointestinal:  Negative for abdominal distention, abdominal pain, nausea and vomiting.   Genitourinary:  Negative for difficulty urinating.   Musculoskeletal:  Positive for arthralgias, back pain, gait problem and myalgias. Negative for neck pain and neck stiffness.   Skin:  Negative for color change, pallor and wound.   Neurological:  Negative for dizziness, syncope, speech difficulty, weakness, light-headedness, numbness and headaches.   Psychiatric/Behavioral:  Negative for agitation and suicidal ideas.    All other systems reviewed and are negative.    Objective:     Vital Signs (Most Recent):  Temp: 98.1 °F (36.7 °C) (09/23/23 1056)  Pulse: 80 (09/23/23 1301)  Resp: 20 (09/23/23 1301)  BP: (!) 158/97 (09/23/23 1301)  SpO2: (!) 94 % (09/23/23 1301) Vital Signs (24h Range):  Temp:  [98.1 °F (36.7 °C)] 98.1 °F (36.7 °C)  Pulse:  [80-91] 80  Resp:  [16-24] 20  SpO2:  [94 %-100 %] 94 %  BP: (121-158)/() 158/97     Weight: 93.9 kg (207 lb)  Body mass index is  26.58 kg/m².     Physical Exam  Constitutional:       Appearance: Normal appearance.   HENT:      Head: Normocephalic and atraumatic.      Nose: Nose normal.   Eyes:      Pupils: Pupils are equal, round, and reactive to light.   Cardiovascular:      Rate and Rhythm: Normal rate.      Pulses: Normal pulses.      Comments: Normal peripheral pulses  Pulmonary:      Effort: Pulmonary effort is normal. No respiratory distress.   Chest:      Chest wall: No tenderness.   Abdominal:      General: Abdomen is flat. Bowel sounds are normal. There is no distension.      Palpations: Abdomen is soft.      Tenderness: There is no abdominal tenderness.   Musculoskeletal:         General: Tenderness and signs of injury present. No swelling or deformity.      Cervical back: Normal range of motion and neck supple. No tenderness.      Comments: Right upper extremity right lower extremity splinted before my arrival.   Skin:     General: Skin is warm and dry.      Capillary Refill: Capillary refill takes less than 2 seconds.      Findings: No lesion.   Neurological:      General: No focal deficit present.      Mental Status: He is alert and oriented to person, place, and time. Mental status is at baseline.   Psychiatric:         Mood and Affect: Mood normal.         Behavior: Behavior normal.         Thought Content: Thought content normal.         Judgment: Judgment normal.            I have reviewed all pertinent lab results within the past 24 hours.    Significant Diagnostics:  I have reviewed all pertinent imaging results/findings within the past 24 hours.

## 2023-09-23 NOTE — CONSULTS
[facility] - [facility] ICU  Pulmonary Critical Care Note    Patient Name: José Henry  MRN: [id number]  Admission Date: 9/23/2023  Hospital Length of Stay: 0 days  Code Status: Full Code  Attending Provider: Ehsan Martinez MD  Primary Care Provider: Brayden Welsh MD     Subjective:     HPI:   47-year-old male presents to the hospital as a trauma transfer.  By report he fell or slept wall fall from a balcony of approximally 12th and 13th feet.  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  He was evaluated by Neurosurgery who plans to take him to OR for L2-L3 laminectomy on 09/26/2023.  Evaluated by Orthopedic surgery for polytrauma including right hip, talus, and calcaneal fracture, and right radius fracture.  Admitted to ICU for close monitoring.    Hospital Course/Significant events:      24 Hour Interval History:      No past medical history on file.    No past surgical history on file.    Social History     Socioeconomic History    Marital status:            Current Outpatient Medications   Medication Instructions    dextroamphetamine-amphetamine 30 mg Tab 1 tablet, Oral, Daily    pantoprazole (PROTONIX) 40 MG tablet 1 tablet, Oral, Daily       Current Inpatient Medications   acetaminophen  650 mg Oral Q4H    calcium-vitamin D3  1 tablet Oral BID    docusate sodium  100 mg Oral BID    famotidine  20 mg Oral BID    methocarbamoL  1,000 mg Intravenous Q8H    polyethylene glycol  17 g Oral BID       Current Intravenous Infusions   sodium chloride 0.9% 125 mL/hr at 09/23/23 1618         Review of Systems   Constitutional:  Negative for chills and fever.   Respiratory:  Negative for cough, hemoptysis and  shortness of breath.    Cardiovascular:  Negative for chest pain and palpitations.   Gastrointestinal:  Negative for abdominal pain, nausea and vomiting.   Musculoskeletal:  Positive for back pain, falls and joint pain (Right ankle pain, right arm pain).          Objective:       Intake/Output Summary (Last 24 hours) at 9/23/2023 1706  Last data filed at 9/23/2023 1605  Gross per 24 hour   Intake 26.58 ml   Output --   Net 26.58 ml         Vital Signs (Most Recent):  Temp: 98.1 °F (36.7 °C) (09/23/23 1056)  Pulse: 93 (09/23/23 1645)  Resp: (!) 24 (09/23/23 1645)  BP: (!) 147/87 (09/23/23 1645)  SpO2: 100 % (09/23/23 1645)  Body mass index is 26.58 kg/m².  Weight: 93.9 kg (207 lb) Vital Signs (24h Range):  Temp:  [98.1 °F (36.7 °C)] 98.1 °F (36.7 °C)  Pulse:  [65-93] 93  Resp:  [16-24] 24  SpO2:  [94 %-100 %] 100 %  BP: (121-158)/() 147/87     Physical Exam  Vitals and nursing note reviewed.   HENT:      Head: Normocephalic and atraumatic.   Eyes:      Extraocular Movements: Extraocular movements intact.      Conjunctiva/sclera: Conjunctivae normal.      Pupils: Pupils are equal, round, and reactive to light.   Cardiovascular:      Rate and Rhythm: Normal rate and regular rhythm.      Pulses: Normal pulses.      Heart sounds: Normal heart sounds.   Pulmonary:      Effort: Pulmonary effort is normal. No respiratory distress.      Breath sounds: Normal breath sounds.   Abdominal:      General: Abdomen is flat. Bowel sounds are normal. There is no distension.      Palpations: Abdomen is soft. There is no mass.   Musculoskeletal:      Cervical back: Normal range of motion and neck supple.      Right lower leg: No edema.      Left lower leg: No edema.      Comments: Right lower extremity in line upper extremity wrapped with dressing   Skin:     General: Skin is warm and dry.   Neurological:      Mental Status: He is alert and oriented to person, place, and time.      Comments: Able to move bilaterally and sensation  "present bilaterally.  Also able to move fingers in right arm with sensation intact.           Lines/Drains/Airways       Peripheral Intravenous Line  Duration                  Peripheral IV - Single Lumen 09/23/23 18 G Left Antecubital <1 day                    Significant Labs:    Lab Results   Component Value Date    WBC 18.97 (H) 09/23/2023    HGB 14.5 09/23/2023    HCT 42.1 09/23/2023    MCV 88.3 09/23/2023     09/23/2023           BMP  Lab Results   Component Value Date     09/23/2023    K 4.0 09/23/2023    CHLORIDE 107 09/23/2023    CO2 18 (L) 09/23/2023    BUN 9.5 09/23/2023    CREATININE 0.69 (L) 09/23/2023    CALCIUM 8.8 09/23/2023         ABG  No results for input(s): "PH", "PO2", "PCO2", "HCO3", "POCBASEDEF" in the last 168 hours.    Mechanical Ventilation Support:         Significant Imaging:  I have reviewed the pertinent imaging within the past 24 hours.        Assessment/Plan:     Assessment  Polytrauma right radial fracture, right hip, talus, and calcaneus fracture  L3 burst fracture  Leukocytosis-likely reactive      Plan  Admit to ICU for close monitoring  Q.1h neuro checks  Multimodal pain control  Neurosurgery following and appreciate recs  Orthopedic surgery following appreciate recs  Rest of management per trauma team    DVT Prophylaxis: n/a   GI Prophylaxis: famotidine       Alejandro Peck DO  Pulmonary Critical Care Medicine  [facility] - [facility] ICU    "

## 2023-09-23 NOTE — Clinical Note
Diagnosis: Fall, initial encounter [754312]   Admitting Provider:: XAVIER SANCHEZ [[id number]]   Future Attending Provider: XAVIER SANCHEZ [[id number]]   Reason for IP Medical Treatment  (Clinical interventions that can only be accomplished in the IP setting? ) :: trauma   I certify that Inpatient services for greater than or equal to 2 midnights are medically necessary:: Yes   Plans for Post-Acute care--if anticipated (pick the single best option):: F. IP Rehabilitation Unit Placement

## 2023-09-23 NOTE — PROGRESS NOTES
Patient is a poly trauma. He has neurosurgical pathology along with orthopedic pathology. Has a right completely displaced thermal neck fracture with a small femoral neck segment. This is surgical will likely need total hip arthroplasty by joint specialist early this week. Patient also has a right commuted mildly displayed calcaneus fracture. Will discuss surgical treatment of this tomorrow. Patient has a nondisplaced distal radius frac ture will remain nonweightbearing in the splint     Ck

## 2023-09-24 PROBLEM — S06.4XAA EPIDURAL HEMATOMA: Status: ACTIVE | Noted: 2023-09-24

## 2023-09-24 PROBLEM — S92.001A CLOSED NONDISPLACED FRACTURE OF RIGHT CALCANEUS: Status: ACTIVE | Noted: 2023-09-24

## 2023-09-24 PROBLEM — M48.061 SPINAL STENOSIS OF LUMBAR REGION: Status: ACTIVE | Noted: 2023-09-24

## 2023-09-24 PROBLEM — S72.91XA CLOSED FRACTURE OF RIGHT FEMUR: Status: ACTIVE | Noted: 2023-09-24

## 2023-09-24 LAB
ALBUMIN SERPL-MCNC: 4 G/DL (ref 3.5–5)
ALBUMIN/GLOB SERPL: 1.5 RATIO (ref 1.1–2)
ALP SERPL-CCNC: 58 UNIT/L (ref 40–150)
ALT SERPL-CCNC: 34 UNIT/L (ref 0–55)
APPEARANCE UR: CLEAR
AST SERPL-CCNC: 54 UNIT/L (ref 5–34)
BACTERIA #/AREA URNS AUTO: ABNORMAL /HPF
BASOPHILS # BLD AUTO: 0.01 X10(3)/MCL
BASOPHILS NFR BLD AUTO: 0.1 %
BILIRUB SERPL-MCNC: 1 MG/DL
BILIRUB UR QL STRIP.AUTO: NEGATIVE
BUN SERPL-MCNC: 9.9 MG/DL (ref 8.9–20.6)
CALCIUM SERPL-MCNC: 8.5 MG/DL (ref 8.4–10.2)
CHLORIDE SERPL-SCNC: 105 MMOL/L (ref 98–107)
CO2 SERPL-SCNC: 16 MMOL/L (ref 22–29)
COLOR UR AUTO: YELLOW
CREAT SERPL-MCNC: 0.75 MG/DL (ref 0.73–1.18)
EOSINOPHIL # BLD AUTO: 0.01 X10(3)/MCL (ref 0–0.9)
EOSINOPHIL NFR BLD AUTO: 0.1 %
ERYTHROCYTE [DISTWIDTH] IN BLOOD BY AUTOMATED COUNT: 13.5 % (ref 11.5–17)
GFR SERPLBLD CREATININE-BSD FMLA CKD-EPI: >60 MLS/MIN/1.73/M2
GLOBULIN SER-MCNC: 2.7 GM/DL (ref 2.4–3.5)
GLUCOSE SERPL-MCNC: 53 MG/DL (ref 74–100)
GLUCOSE UR QL STRIP.AUTO: NEGATIVE
HCT VFR BLD AUTO: 41 % (ref 42–52)
HGB BLD-MCNC: 13.9 G/DL (ref 14–18)
IMM GRANULOCYTES # BLD AUTO: 0.08 X10(3)/MCL (ref 0–0.04)
IMM GRANULOCYTES NFR BLD AUTO: 0.5 %
KETONES UR QL STRIP.AUTO: ABNORMAL
LACTATE SERPL-SCNC: 0.5 MMOL/L (ref 0.5–2.2)
LACTATE SERPL-SCNC: 0.6 MMOL/L (ref 0.5–2.2)
LEUKOCYTE ESTERASE UR QL STRIP.AUTO: NEGATIVE
LYMPHOCYTES # BLD AUTO: 1.14 X10(3)/MCL (ref 0.6–4.6)
LYMPHOCYTES NFR BLD AUTO: 7.5 %
MCH RBC QN AUTO: 30.3 PG (ref 27–31)
MCHC RBC AUTO-ENTMCNC: 33.9 G/DL (ref 33–36)
MCV RBC AUTO: 89.5 FL (ref 80–94)
MONOCYTES # BLD AUTO: 1.23 X10(3)/MCL (ref 0.1–1.3)
MONOCYTES NFR BLD AUTO: 8.1 %
NEUTROPHILS # BLD AUTO: 12.7 X10(3)/MCL (ref 2.1–9.2)
NEUTROPHILS NFR BLD AUTO: 83.7 %
NITRITE UR QL STRIP.AUTO: NEGATIVE
NRBC BLD AUTO-RTO: 0 %
PH UR STRIP.AUTO: 5.5 [PH]
PLATELET # BLD AUTO: 223 X10(3)/MCL (ref 130–400)
PMV BLD AUTO: 10.4 FL (ref 7.4–10.4)
POTASSIUM SERPL-SCNC: 4.5 MMOL/L (ref 3.5–5.1)
PROT SERPL-MCNC: 6.7 GM/DL (ref 6.4–8.3)
PROT UR QL STRIP.AUTO: NEGATIVE
RBC # BLD AUTO: 4.58 X10(6)/MCL (ref 4.7–6.1)
RBC #/AREA URNS AUTO: 10 /HPF
RBC UR QL AUTO: ABNORMAL
SODIUM SERPL-SCNC: 137 MMOL/L (ref 136–145)
SP GR UR STRIP.AUTO: 1.02 (ref 1–1.03)
SQUAMOUS #/AREA URNS AUTO: <5 /HPF
UROBILINOGEN UR STRIP-ACNC: 0.2
WBC # SPEC AUTO: 15.17 X10(3)/MCL (ref 4.5–11.5)
WBC #/AREA URNS AUTO: <5 /HPF

## 2023-09-24 PROCEDURE — 25000003 PHARM REV CODE 250: Performed by: NURSE PRACTITIONER

## 2023-09-24 PROCEDURE — 99232 PR SUBSEQUENT HOSPITAL CARE,LEVL II: ICD-10-PCS | Mod: ,,, | Performed by: NEUROLOGICAL SURGERY

## 2023-09-24 PROCEDURE — 83605 ASSAY OF LACTIC ACID: CPT | Performed by: NURSE PRACTITIONER

## 2023-09-24 PROCEDURE — 99223 PR INITIAL HOSPITAL CARE,LEVL III: ICD-10-PCS | Mod: ,,, | Performed by: ORTHOPAEDIC SURGERY

## 2023-09-24 PROCEDURE — 99232 SBSQ HOSP IP/OBS MODERATE 35: CPT | Mod: ,,, | Performed by: NEUROLOGICAL SURGERY

## 2023-09-24 PROCEDURE — 99291 CRITICAL CARE FIRST HOUR: CPT | Mod: ,,, | Performed by: SURGERY

## 2023-09-24 PROCEDURE — 20000000 HC ICU ROOM

## 2023-09-24 PROCEDURE — 99291 PR CRITICAL CARE, E/M 30-74 MINUTES: ICD-10-PCS | Mod: ,,, | Performed by: SURGERY

## 2023-09-24 PROCEDURE — 80053 COMPREHEN METABOLIC PANEL: CPT | Performed by: NURSE PRACTITIONER

## 2023-09-24 PROCEDURE — 63600175 PHARM REV CODE 636 W HCPCS: Performed by: SURGERY

## 2023-09-24 PROCEDURE — 99223 1ST HOSP IP/OBS HIGH 75: CPT | Mod: ,,, | Performed by: ORTHOPAEDIC SURGERY

## 2023-09-24 PROCEDURE — 81001 URINALYSIS AUTO W/SCOPE: CPT | Performed by: NEUROLOGICAL SURGERY

## 2023-09-24 PROCEDURE — 85025 COMPLETE CBC W/AUTO DIFF WBC: CPT | Performed by: NURSE PRACTITIONER

## 2023-09-24 PROCEDURE — 25000003 PHARM REV CODE 250: Performed by: NEUROLOGICAL SURGERY

## 2023-09-24 PROCEDURE — 25000003 PHARM REV CODE 250: Performed by: SURGERY

## 2023-09-24 RX ORDER — KETOROLAC TROMETHAMINE 30 MG/ML
15 INJECTION, SOLUTION INTRAMUSCULAR; INTRAVENOUS ONCE
Status: COMPLETED | OUTPATIENT
Start: 2023-09-24 | End: 2023-09-24

## 2023-09-24 RX ADMIN — SODIUM CHLORIDE: 9 INJECTION, SOLUTION INTRAVENOUS at 08:09

## 2023-09-24 RX ADMIN — ACETAMINOPHEN 650 MG: 325 TABLET, FILM COATED ORAL at 01:09

## 2023-09-24 RX ADMIN — MUPIROCIN: 20 OINTMENT TOPICAL at 08:09

## 2023-09-24 RX ADMIN — ACETAMINOPHEN 650 MG: 325 TABLET, FILM COATED ORAL at 06:09

## 2023-09-24 RX ADMIN — MORPHINE SULFATE 4 MG: 4 INJECTION, SOLUTION INTRAMUSCULAR; INTRAVENOUS at 07:09

## 2023-09-24 RX ADMIN — ACETAMINOPHEN 650 MG: 325 TABLET, FILM COATED ORAL at 05:09

## 2023-09-24 RX ADMIN — MORPHINE SULFATE 4 MG: 4 INJECTION, SOLUTION INTRAMUSCULAR; INTRAVENOUS at 01:09

## 2023-09-24 RX ADMIN — MORPHINE SULFATE 4 MG: 4 INJECTION, SOLUTION INTRAMUSCULAR; INTRAVENOUS at 04:09

## 2023-09-24 RX ADMIN — POLYETHYLENE GLYCOL 3350 17 G: 17 POWDER, FOR SOLUTION ORAL at 08:09

## 2023-09-24 RX ADMIN — SODIUM CHLORIDE: 9 INJECTION, SOLUTION INTRAVENOUS at 12:09

## 2023-09-24 RX ADMIN — METHOCARBAMOL 500 MG: 100 INJECTION INTRAMUSCULAR; INTRAVENOUS at 09:09

## 2023-09-24 RX ADMIN — MORPHINE SULFATE 4 MG: 4 INJECTION, SOLUTION INTRAMUSCULAR; INTRAVENOUS at 11:09

## 2023-09-24 RX ADMIN — FAMOTIDINE 20 MG: 20 TABLET, FILM COATED ORAL at 08:09

## 2023-09-24 RX ADMIN — Medication 1 TABLET: at 08:09

## 2023-09-24 RX ADMIN — MORPHINE SULFATE 4 MG: 4 INJECTION, SOLUTION INTRAMUSCULAR; INTRAVENOUS at 09:09

## 2023-09-24 RX ADMIN — MORPHINE SULFATE 4 MG: 4 INJECTION, SOLUTION INTRAMUSCULAR; INTRAVENOUS at 06:09

## 2023-09-24 RX ADMIN — MORPHINE SULFATE 4 MG: 4 INJECTION, SOLUTION INTRAMUSCULAR; INTRAVENOUS at 05:09

## 2023-09-24 RX ADMIN — MORPHINE SULFATE 4 MG: 4 INJECTION, SOLUTION INTRAMUSCULAR; INTRAVENOUS at 12:09

## 2023-09-24 RX ADMIN — DOCUSATE SODIUM 100 MG: 100 CAPSULE, LIQUID FILLED ORAL at 08:09

## 2023-09-24 RX ADMIN — Medication 6 MG: at 08:09

## 2023-09-24 RX ADMIN — OXYCODONE HYDROCHLORIDE 10 MG: 10 TABLET ORAL at 12:09

## 2023-09-24 RX ADMIN — ACETAMINOPHEN 650 MG: 325 TABLET, FILM COATED ORAL at 09:09

## 2023-09-24 RX ADMIN — OXYCODONE HYDROCHLORIDE 10 MG: 10 TABLET ORAL at 08:09

## 2023-09-24 RX ADMIN — OXYCODONE HYDROCHLORIDE 10 MG: 10 TABLET ORAL at 03:09

## 2023-09-24 RX ADMIN — METHOCARBAMOL 500 MG: 100 INJECTION INTRAMUSCULAR; INTRAVENOUS at 01:09

## 2023-09-24 RX ADMIN — MORPHINE SULFATE 4 MG: 4 INJECTION, SOLUTION INTRAMUSCULAR; INTRAVENOUS at 02:09

## 2023-09-24 RX ADMIN — OXYCODONE HYDROCHLORIDE 10 MG: 10 TABLET ORAL at 01:09

## 2023-09-24 RX ADMIN — KETOROLAC TROMETHAMINE 15 MG: 30 INJECTION, SOLUTION INTRAMUSCULAR; INTRAVENOUS at 10:09

## 2023-09-24 RX ADMIN — METHOCARBAMOL 500 MG: 100 INJECTION INTRAMUSCULAR; INTRAVENOUS at 05:09

## 2023-09-24 NOTE — PROGRESS NOTES
[facility] - [facility] ICU  Pulmonary Critical Care Note    Patient Name: José Henry  MRN: [id number]  Admission Date: 9/23/2023  Hospital Length of Stay: 1 days  Code Status: Full Code  Attending Provider: Ehsan Martinez MD  Primary Care Provider: Brayden Welsh MD     Subjective:     HPI:   47-year-old male presents to the hospital as a trauma transfer.  By report he fell or slept wall fall from a balcony of approximally 12th and 13th feet.  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  He was evaluated by Neurosurgery who plans to take him to OR for L2-L3 laminectomy on 09/26/2023.  Evaluated by Orthopedic surgery for polytrauma including right hip, talus, and calcaneal fracture, and right radius fracture.  Admitted to ICU for close monitoring and frequent neuro checks.      24 Hour Interval History:  Patient is sleeping soundly this morning after pain medications.  Wife at the bedside.  I did not awaken patient but did discuss with the wife.  Plans for surgery tomorrow noted in Dr. Stover's note.    No past medical history on file.    No past surgical history on file.    Social History     Socioeconomic History    Marital status:            Current Outpatient Medications   Medication Instructions    dextroamphetamine-amphetamine 30 mg Tab 1 tablet, Oral, Daily    pantoprazole (PROTONIX) 40 MG tablet 1 tablet, Oral, Daily       Current Inpatient Medications   acetaminophen  650 mg Oral Q4H    calcium-vitamin D3  1 tablet Oral BID    docusate sodium  100 mg Oral BID    methocarbamoL  500 mg Intravenous Q8H    mupirocin   Nasal BID    polyethylene glycol  17 g Oral BID       Current Intravenous  Infusions        Review of Systems   Unable to perform ROS: Medical condition          Objective:       Intake/Output Summary (Last 24 hours) at 9/24/2023 1157  Last data filed at 9/24/2023 0927  Gross per 24 hour   Intake 1915.97 ml   Output 1250 ml   Net 665.97 ml         Vital Signs (Most Recent):  Temp: 98 °F (36.7 °C) (09/24/23 0800)  Pulse: 88 (09/24/23 0915)  Resp: 16 (09/24/23 1111)  BP: (!) 147/100 (09/24/23 0900)  SpO2: 98 % (09/24/23 0915)  Body mass index is 26.58 kg/m².  Weight: 93.9 kg (207 lb) Vital Signs (24h Range):  Temp:  [98 °F (36.7 °C)-99 °F (37.2 °C)] 98 °F (36.7 °C)  Pulse:  [64-96] 88  Resp:  [11-26] 16  SpO2:  [93 %-100 %] 98 %  BP: (134-163)/() 147/100     Physical Exam  HENT:      Head: Normocephalic and atraumatic.   Eyes:      Extraocular Movements: Extraocular movements intact.      Conjunctiva/sclera: Conjunctivae normal.      Pupils: Pupils are equal, round, and reactive to light.   Cardiovascular:      Rate and Rhythm: Normal rate and regular rhythm.      Pulses: Normal pulses.      Heart sounds: Normal heart sounds.   Pulmonary:      Effort: Pulmonary effort is normal. No respiratory distress.      Breath sounds: Normal breath sounds.   Abdominal:      General: Abdomen is flat. Bowel sounds are normal. There is no distension.      Palpations: Abdomen is soft. There is no mass.   Musculoskeletal:      Cervical back: Normal range of motion and neck supple.      Right lower leg: No edema.      Left lower leg: No edema.      Comments: Right lower extremity in line upper extremity wrapped with dressing   Skin:     General: Skin is warm and dry.   Neurological:      Mental Status:  Not obtain as patient is sleeping.    Lines/Drains/Airways       Drain  Duration                  Urethral Catheter 09/24/23 0436 <1 day              Peripheral Intravenous Line  Duration                  Peripheral IV - Single Lumen 09/23/23 18 G Left Antecubital 1 day         Peripheral IV - Single  Lumen 09/23/23 1837 20 G Anterior;Left Forearm <1 day                    Significant Labs:    Lab Results   Component Value Date    WBC 15.17 (H) 09/24/2023    HGB 13.9 (L) 09/24/2023    HCT 41.0 (L) 09/24/2023    MCV 89.5 09/24/2023     09/24/2023           BMP  Lab Results   Component Value Date     09/24/2023    K 4.5 09/24/2023    CHLORIDE 105 09/24/2023    CO2 16 (L) 09/24/2023    BUN 9.9 09/24/2023    CREATININE 0.75 09/24/2023    CALCIUM 8.5 09/24/2023              Significant Imaging:  I have reviewed the pertinent imaging within the past 24 hours.        Assessment/Plan:     Assessment  Polytrauma right radial fracture, right hip, talus, and calcaneus fracture  L3 burst fracture  Leukocytosis-likely reactive      Plan  Continue frequent neuro checks and ICU monitoring.  Follow-up but other recommendations from Orthopedics and Neurosurgery.  Patient is admitted to the trauma team.  We will continue to follow with you.       VIOLET Lindquist MD  Pulmonary Critical Care Medicine  [facility] - [facility] ICU
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
## 2023-09-24 NOTE — NURSING
Nurses Note -- 4 Eyes      9/23/2023   7:31 PM      Skin assessed during: Admit      [x] No Altered Skin Integrity Present    []Prevention Measures Documented      [] Yes- Altered Skin Integrity Present or Discovered   [] LDA Added if Not in Epic (Describe Wound)   [] New Altered Skin Integrity was Present on Admit and Documented in LDA   [] Wound Image Taken    Wound Care Consulted? No    Attending Nurse:  KEVIN CASH RN     Second RN/Staff Member:   GENESIS SANCHEZ RN
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
## 2023-09-24 NOTE — NURSING
Nurses Note -- 4 Eyes      9/24/2023   4:36 PM      Skin assessed during: Daily Assessment      [x] No Altered Skin Integrity Present    []Prevention Measures Documented      [] Yes- Altered Skin Integrity Present or Discovered   [] LDA Added if Not in Epic (Describe Wound)   [] New Altered Skin Integrity was Present on Admit and Documented in LDA   [] Wound Image Taken    Wound Care Consulted? No    Attending Nurse:  Sheela CASH RN     Second RN/Staff Member:   ELEUTERIO SPEARS RN

## 2023-09-24 NOTE — PROGRESS NOTES
I have seen the patient, reviewed the Nurse Practitioner's Yaima Jeter's assessment, plan, and progress note. I have personally interviewed and examined the patient at bedside and agree with the findings.       Mr. Henry is accompanied by his mother.  He does not have significant back pain.  His neurological exam has remained unchanged with limitations related to his right upper and right lower extremity fractures.     Awake, conversant, oriented x 3   Motor exam is limited by right upper extremity and right lower extremity in splints  Strength in left upper and lower extremities is 5/5 except left iliopsoas is 2/5 secondary to pain     On the right upper and lower extremities, he is able to wiggle his fingers and toes  Normal sensation to light touch x 4 extremities        Additional neurosurgical recommendations:     1.  Continue neuro checks Q1 hr, as the patient has an acute epidural hematoma behind his L3 burst fracture.  Neuro checks can be reduced to Q2 hrs tomorrow.      2.  The patient is scheduled for a L1 to L5 posterolateral fusion with L2-3 laminectomy on Wednesday, 09/27/2023 at 7:30 a.m.     3.  Mr. Henry is to remain on strict bedrest with no bend at the waist in reverse Trendelenburg with his head of bed at 30°.  He is on logroll precautions.     4.  Lovenox and all other anticoagulants need to be held due to the patient's lumbar epidural hematoma.       5.  I have ordered an [place] TLSO brace, which Mr. Henry will need to wear postoperatively whenever his head of bed is greater than 30°.       6   I have discussed this patient's case with orthopedic surgeon Dr. Stover, who is arranging right hip surgery in the lateral position after his lumbar fusion procedure has been completed.    ----------------------------------------------------------------------------------------------------------------------    [facility] Emergency Dept  Neurosurgery          Interval history:  Vital  signs are stable.  Pain is well controlled at this time.  His brace has not yet been delivered.  Orthopedics to repair right hip after stabilization of lumbar spine which will take place on Wednesday.    History of Present Illness:  This is a 47-year-old  male presented to the hospital as a trauma transfer.  Patient reported he was either sleep walking or unaware that he fell from a balcony approximately 12-13 feet from a 2nd story.  Patient stated he fell onto his legs.  He does not know why he stated he just walked off the balcony.  Multi-trauma found to have right hip fracture, burst fracture of L3 with retropulsion, 13 mm lytic lesion with sclerotic rim and thin zone of transition to the left iliac bone which is an incidental finding, comminuted fracture of the calcaneus with minimal displaced fracture of the talus.  GCS has remained 15.  Family at bedside.  No past medical history.  Not on any blood thinners.  Pain controlled with some breakthrough at times.  Orthopedics and Neurosurgery both consulted.  Trauma is attending.  MRIs have been ordered.          PTA Medications   Medication Sig    dextroamphetamine-amphetamine 30 mg Tab Take 1 tablet by mouth once daily.    pantoprazole (PROTONIX) 40 MG tablet Take 1 tablet by mouth once daily.         Review of patient's allergies indicates:  Not on File    No past medical history on file.  No past surgical history on file.  Family History    None       Tobacco Use    Smoking status: Not on file    Smokeless tobacco: Not on file   Substance and Sexual Activity    Alcohol use: Not on file    Drug use: Not on file    Sexual activity: Not on file     Review of Systems   Musculoskeletal:  Positive for back pain and gait problem.        Pain to orthopedic injury to right upper extremity, right lower extremity.  Back pain.     Objective:     Weight: 93.9 kg (207 lb)  Body mass index is 26.58 kg/m².  Vital Signs (Most Recent):  Temp: 98 °F (36.7 °C) (09/24/23  0800)  Pulse: 88 (09/24/23 0915)  Resp: 16 (09/24/23 1111)  BP: (!) 147/100 (09/24/23 0900)  SpO2: 98 % (09/24/23 0915) Vital Signs (24h Range):  Temp:  [98 °F (36.7 °C)-99 °F (37.2 °C)] 98 °F (36.7 °C)  Pulse:  [64-96] 88  Resp:  [11-26] 16  SpO2:  [93 %-100 %] 98 %  BP: (134-163)/() 147/100     Date 09/24/23 0700 - 09/25/23 0659   Shift 6902-2421 2253-0442 0290-0615 24 Hour Total   INTAKE   I.V.(mL/kg) 306.1(3.3)   306.1(3.3)   Shift Total(mL/kg) 306.1(3.3)   306.1(3.3)   OUTPUT   Urine(mL/kg/hr) 300   300   Shift Total(mL/kg) 300(3.2)   300(3.2)   Weight (kg) 93.9 93.9 93.9 93.9                            Physical Exam:  Nursing note and vitals reviewed.    Constitutional: He appears well-developed and well-nourished.     Eyes: Pupils are equal, round, and reactive to light. Conjunctivae and EOM are normal.     Cardiovascular: Normal rate.     Abdominal: Soft. Bowel sounds are normal.     Skin: Skin displays no rash on trunk and no rash on extremities. Skin displays no lesions on trunk and no lesions on extremities.     Psych/Behavior: He is alert. He is oriented to person, place, and time. He has a normal mood and affect.     Musculoskeletal:        Right Upper Extremities: Range of motion is limited. There is tenderness.        Left Upper Extremities: There is tenderness. Tenderness is located Wrist. Muscle strength is 5/5.       Right Lower Extremities: Range of motion is limited. There is tenderness.        Left Lower Extremities: Muscle strength is 5/5.      Comments: Patient with splint to right upper extremity, right lower extremity.  Able to wiggle fingers and toes on the right.  He does have intact sensation to all extremities.    Left upper and lower extremity 5/5 sensation intact.  Pain limited due to orthopedic injuries.     Neurological:        Sensory: There is no sensory deficit in the trunk. There is no sensory deficit in the extremities.        DTRs: DTRs are DTRS NORMAL AND SYMMETRICnormal  and symmetric. He displays no Babinski's sign on the right side. He displays no Babinski's sign on the left side.        Cranial nerves: Cranial nerve(s) II, III, IV, V, VI, VII, VIII, IX, X, XI and XII are intact.   GCS 15   PERRLA bilateral brisk.    Speech is clear   No facial droop   Cranial nerves grossly intact  Conversant and follows commands.       Significant Labs:  Recent Labs   Lab 09/23/23  1123 09/24/23  0115    137   K 4.0 4.5   CO2 18* 16*   BUN 9.5 9.9   CREATININE 0.69* 0.75   CALCIUM 8.8 8.5       Recent Labs   Lab 09/23/23  1123 09/24/23  0115   WBC 18.97* 15.17*   HGB 14.5 13.9*   HCT 42.1 41.0*    223       Recent Labs   Lab 09/23/23  1518   INR 1.0     Microbiology Results (last 7 days)       ** No results found for the last 168 hours. **            Assessment/Plan:      TLSO brace via orthotics consult.  Has not yet been delivered.   Bed rest, log-rolling, spinal precautions   No bend at the waist, reverse Trendelenburg with head of bed at 30°.  Pain control  SCDs   Frequent neuro/neuromuscular exams   L1-L5 posterolateral fusion with L2-3 laminectomy to take place on Wednesday 09/28/2023.  Lovenox and all anticoagulation to be held due to lumbar epidural hematoma.    Patient on [place]scal vitamin-D.             Active Diagnoses:    Diagnosis Date Noted POA    Closed nondisplaced fracture of right calcaneus [S92.001A] 09/24/2023 Yes    Closed fracture of right femur [S72.91XA] 09/24/2023 Yes    Closed fracture of third lumbar vertebra [S32.039A] 09/23/2023 Yes    Radius fracture [S52.90XA] 09/23/2023 Yes    Closed right hip fracture [S72.001A] 09/23/2023 Yes    Displaced fracture of body of right talus, initial encounter for closed fracture [S92.121A] 09/23/2023 Yes      Problems Resolved During this Admission:       Thank you for your consult. I will follow-up with patient. Please contact us if you have any additional questions.      Yaima Jeter, [facility]  Neurosurgery  [facility] Dept

## 2023-09-25 LAB
ALBUMIN SERPL-MCNC: 3.3 G/DL (ref 3.5–5)
ALBUMIN/GLOB SERPL: 1.1 RATIO (ref 1.1–2)
ALP SERPL-CCNC: 61 UNIT/L (ref 40–150)
ALT SERPL-CCNC: 23 UNIT/L (ref 0–55)
AST SERPL-CCNC: 26 UNIT/L (ref 5–34)
BASOPHILS # BLD AUTO: 0.03 X10(3)/MCL
BASOPHILS NFR BLD AUTO: 0.3 %
BILIRUB SERPL-MCNC: 0.7 MG/DL
BUN SERPL-MCNC: 9.1 MG/DL (ref 8.9–20.6)
CALCIUM SERPL-MCNC: 8.8 MG/DL (ref 8.4–10.2)
CHLORIDE SERPL-SCNC: 104 MMOL/L (ref 98–107)
CO2 SERPL-SCNC: 18 MMOL/L (ref 22–29)
CREAT SERPL-MCNC: 0.77 MG/DL (ref 0.73–1.18)
CRP SERPL-MCNC: 199.8 MG/L
EOSINOPHIL # BLD AUTO: 0.16 X10(3)/MCL (ref 0–0.9)
EOSINOPHIL NFR BLD AUTO: 1.6 %
ERYTHROCYTE [DISTWIDTH] IN BLOOD BY AUTOMATED COUNT: 13.2 % (ref 11.5–17)
GFR SERPLBLD CREATININE-BSD FMLA CKD-EPI: >60 MLS/MIN/1.73/M2
GLOBULIN SER-MCNC: 3 GM/DL (ref 2.4–3.5)
GLUCOSE SERPL-MCNC: 85 MG/DL (ref 74–100)
HCT VFR BLD AUTO: 40.2 % (ref 42–52)
HGB BLD-MCNC: 13.6 G/DL (ref 14–18)
IMM GRANULOCYTES # BLD AUTO: 0.07 X10(3)/MCL (ref 0–0.04)
IMM GRANULOCYTES NFR BLD AUTO: 0.7 %
LYMPHOCYTES # BLD AUTO: 1.29 X10(3)/MCL (ref 0.6–4.6)
LYMPHOCYTES NFR BLD AUTO: 13 %
MCH RBC QN AUTO: 30.2 PG (ref 27–31)
MCHC RBC AUTO-ENTMCNC: 33.8 G/DL (ref 33–36)
MCV RBC AUTO: 89.3 FL (ref 80–94)
MONOCYTES # BLD AUTO: 0.91 X10(3)/MCL (ref 0.1–1.3)
MONOCYTES NFR BLD AUTO: 9.2 %
NEUTROPHILS # BLD AUTO: 7.45 X10(3)/MCL (ref 2.1–9.2)
NEUTROPHILS NFR BLD AUTO: 75.2 %
NRBC BLD AUTO-RTO: 0 %
PLATELET # BLD AUTO: 182 X10(3)/MCL (ref 130–400)
PMV BLD AUTO: 9.4 FL (ref 7.4–10.4)
POTASSIUM SERPL-SCNC: 3.7 MMOL/L (ref 3.5–5.1)
PREALB SERPL-MCNC: 14.8 MG/DL (ref 18–45)
PROT SERPL-MCNC: 6.3 GM/DL (ref 6.4–8.3)
RBC # BLD AUTO: 4.5 X10(6)/MCL (ref 4.7–6.1)
SODIUM SERPL-SCNC: 134 MMOL/L (ref 136–145)
WBC # SPEC AUTO: 9.91 X10(3)/MCL (ref 4.5–11.5)

## 2023-09-25 PROCEDURE — 20000000 HC ICU ROOM

## 2023-09-25 PROCEDURE — 99232 SBSQ HOSP IP/OBS MODERATE 35: CPT | Mod: ,,, | Performed by: NEUROLOGICAL SURGERY

## 2023-09-25 PROCEDURE — 25000003 PHARM REV CODE 250: Performed by: NEUROLOGICAL SURGERY

## 2023-09-25 PROCEDURE — 63600175 PHARM REV CODE 636 W HCPCS: Performed by: NURSE PRACTITIONER

## 2023-09-25 PROCEDURE — 63600175 PHARM REV CODE 636 W HCPCS: Performed by: SURGERY

## 2023-09-25 PROCEDURE — 80053 COMPREHEN METABOLIC PANEL: CPT | Performed by: NURSE PRACTITIONER

## 2023-09-25 PROCEDURE — 99232 PR SUBSEQUENT HOSPITAL CARE,LEVL II: ICD-10-PCS | Mod: ,,, | Performed by: NEUROLOGICAL SURGERY

## 2023-09-25 PROCEDURE — 25000003 PHARM REV CODE 250: Performed by: NURSE PRACTITIONER

## 2023-09-25 PROCEDURE — 85025 COMPLETE CBC W/AUTO DIFF WBC: CPT | Performed by: NURSE PRACTITIONER

## 2023-09-25 PROCEDURE — 25000003 PHARM REV CODE 250: Performed by: SURGERY

## 2023-09-25 PROCEDURE — 86140 C-REACTIVE PROTEIN: CPT | Performed by: NURSE PRACTITIONER

## 2023-09-25 PROCEDURE — 84134 ASSAY OF PREALBUMIN: CPT | Performed by: NURSE PRACTITIONER

## 2023-09-25 RX ORDER — GABAPENTIN 300 MG/1
300 CAPSULE ORAL 3 TIMES DAILY
Status: DISCONTINUED | OUTPATIENT
Start: 2023-09-25 | End: 2023-10-02

## 2023-09-25 RX ORDER — ONDANSETRON 2 MG/ML
4 INJECTION INTRAMUSCULAR; INTRAVENOUS EVERY 6 HOURS PRN
Status: DISCONTINUED | OUTPATIENT
Start: 2023-09-25 | End: 2023-10-16 | Stop reason: HOSPADM

## 2023-09-25 RX ORDER — METHOCARBAMOL 500 MG/1
1000 TABLET, FILM COATED ORAL 3 TIMES DAILY
Status: DISCONTINUED | OUTPATIENT
Start: 2023-09-25 | End: 2023-09-28

## 2023-09-25 RX ADMIN — ACETAMINOPHEN 650 MG: 325 TABLET, FILM COATED ORAL at 01:09

## 2023-09-25 RX ADMIN — OXYCODONE HYDROCHLORIDE 10 MG: 10 TABLET ORAL at 05:09

## 2023-09-25 RX ADMIN — MORPHINE SULFATE 4 MG: 4 INJECTION, SOLUTION INTRAMUSCULAR; INTRAVENOUS at 04:09

## 2023-09-25 RX ADMIN — MORPHINE SULFATE 4 MG: 4 INJECTION, SOLUTION INTRAMUSCULAR; INTRAVENOUS at 06:09

## 2023-09-25 RX ADMIN — OXYCODONE HYDROCHLORIDE 10 MG: 10 TABLET ORAL at 11:09

## 2023-09-25 RX ADMIN — METHOCARBAMOL 500 MG: 100 INJECTION INTRAMUSCULAR; INTRAVENOUS at 05:09

## 2023-09-25 RX ADMIN — Medication 6 MG: at 08:09

## 2023-09-25 RX ADMIN — METHOCARBAMOL 500 MG: 100 INJECTION INTRAMUSCULAR; INTRAVENOUS at 02:09

## 2023-09-25 RX ADMIN — DOCUSATE SODIUM 100 MG: 100 CAPSULE, LIQUID FILLED ORAL at 08:09

## 2023-09-25 RX ADMIN — Medication 1 TABLET: at 08:09

## 2023-09-25 RX ADMIN — GABAPENTIN 300 MG: 300 CAPSULE ORAL at 08:09

## 2023-09-25 RX ADMIN — ACETAMINOPHEN 650 MG: 325 TABLET, FILM COATED ORAL at 05:09

## 2023-09-25 RX ADMIN — MUPIROCIN: 20 OINTMENT TOPICAL at 08:09

## 2023-09-25 RX ADMIN — MORPHINE SULFATE 4 MG: 4 INJECTION, SOLUTION INTRAMUSCULAR; INTRAVENOUS at 01:09

## 2023-09-25 RX ADMIN — MORPHINE SULFATE 4 MG: 4 INJECTION, SOLUTION INTRAMUSCULAR; INTRAVENOUS at 08:09

## 2023-09-25 RX ADMIN — METHOCARBAMOL 1000 MG: 500 TABLET ORAL at 08:09

## 2023-09-25 RX ADMIN — POLYETHYLENE GLYCOL 3350 17 G: 17 POWDER, FOR SOLUTION ORAL at 08:09

## 2023-09-25 RX ADMIN — OXYCODONE HYDROCHLORIDE 10 MG: 10 TABLET ORAL at 01:09

## 2023-09-25 RX ADMIN — ACETAMINOPHEN 650 MG: 325 TABLET, FILM COATED ORAL at 09:09

## 2023-09-25 RX ADMIN — OXYCODONE HYDROCHLORIDE 10 MG: 10 TABLET ORAL at 12:09

## 2023-09-25 RX ADMIN — ONDANSETRON 4 MG: 2 INJECTION INTRAMUSCULAR; INTRAVENOUS at 05:09

## 2023-09-25 NOTE — PROGRESS NOTES
[facility] [address]  Neurosurgery  Progress Note    Subjective:     Interval History: He is lying flat in bed, NAD. His back pain is currently controlled with meds. He denies numbness in bilateral LE. He does c/o bilateral knee pain.    History of Present Illness: Patient is a 47-year-old male with no significant PMHx, who presented to the hospital as a trauma transfer on 9/23.  By report he fell or slept walked off of a balcony of approximally 12th and 13th feet.  He was found to have right hip fracture, burst fracture of L3 with retropulsion, central cord stenosis based on CT, 13 mm lytic lesion with sclerotic rim and thinned zone of transition in the left iliac bone which is a incidental finding, comminuted fracture of the calcaneus with minimally displaced fracture of the talus.  He was evaluated by Dr. Diaz who plans to take him to OR for L2-L3 laminectomy on 09/26/2023.  Evaluated by Orthopedic surgery for polytrauma including right hip, talus, and calcaneal fracture, and right radius fracture.  Admitted to ICU for close monitoring and frequent neuro checks.     Post-Op Info:  Procedure(s) (LRB):  ORIF, FRACTURE, FEMUR (Right)          Medications:  Continuous Infusions:  Scheduled Meds:   acetaminophen  650 mg Oral Q4H    calcium-vitamin D3  1 tablet Oral BID    docusate sodium  100 mg Oral BID    methocarbamoL  500 mg Intravenous Q8H    mupirocin   Nasal BID    polyethylene glycol  17 g Oral BID     PRN Meds:bisacodyL, melatonin, morphine, oxyCODONE     Review of Systems  Objective:     Weight: 93.9 kg (207 lb)  Body mass index is 26.58 kg/m².  Vital Signs (Most Recent):  Temp: 97.7 °F (36.5 °C) (09/25/23 0800)  Pulse: 78 (09/25/23 0700)  Resp: 18 (09/25/23 0810)  BP: 133/85 (09/25/23 0700)  SpO2: 96 % (09/25/23 0700) Vital Signs (24h Range):  Temp:  [97 °F (36.1 °C)-98.6 °F (37 °C)] 97.7 °F (36.5 °C)  Pulse:  [] 78  Resp:  [10-22] 18  SpO2:  [91 %-100 %] 96 %  BP: (122-184)/()  "133/85     Date 09/25/23 0700 - 09/26/23 0659   Shift 7878-5130 3350-0174 7831-2807 24 Hour Total   INTAKE   Shift Total(mL/kg)       OUTPUT   Urine(mL/kg/hr) 150   150   Shift Total(mL/kg) 150(1.6)   150(1.6)   Weight (kg) 93.9 93.9 93.9 93.9                            Urethral Catheter 09/24/23 0436 (Active)   Site Assessment Clean;Intact 09/25/23 0400   Collection Container Urimeter 09/25/23 0400   Securement Method secured to top of thigh w/ adhesive device 09/25/23 0400   Catheter Care Performed yes 09/25/23 0400   Reason for Continuing Urinary Catheterization Urinary retention 09/25/23 0400   CAUTI Prevention Bundle Securement Device in place with 1" slack;Intact seal between catheter & drainage tubing;Drainage bag/urimeter off the floor;Sheeting clip in use;No dependent loops or kinks;Drainage bag/urimeter not overfilled (<2/3 full);Drainage bag/urimeter below bladder 09/24/23 2000   Output (mL) 150 mL 09/25/23 0800       Neurosurgery Physical Exam  Awake, conversant, oriented x 3   Motor exam is limited by right upper extremity and right lower extremity in splints  Strength in left upper and lower extremities is 5/5 except left iliopsoas is 2/5 secondary to pain     On the right upper and lower extremities, he is able to wiggle his fingers and toes  Normal sensation to light touch x 4 extremities  Kate's sign negative bilaterally  Right Babinski and clonus not able to be evaluated secondary to fractures  Left Babinski downgoing toe and no clonus    Significant Labs:  Recent Labs   Lab 09/23/23  1123 09/24/23  0115 09/25/23  0151    137 134*   K 4.0 4.5 3.7   CO2 18* 16* 18*   BUN 9.5 9.9 9.1   CREATININE 0.69* 0.75 0.77   CALCIUM 8.8 8.5 8.8     Recent Labs   Lab 09/23/23  1123 09/24/23  0115 09/25/23  0151   WBC 18.97* 15.17* 9.91   HGB 14.5 13.9* 13.6*   HCT 42.1 41.0* 40.2*    223 182     Recent Labs   Lab 09/23/23  1518   INR 1.0     Microbiology Results (last 7 days)       ** No results " found for the last 168 hours. **            Significant Diagnostics:      Assessment/Plan:     Active Diagnoses:    Diagnosis Date Noted POA    Closed nondisplaced fracture of right calcaneus [S92.001A] 09/24/2023 Yes    Closed fracture of right femur [S72.91XA] 09/24/2023 Yes    Epidural hematoma [S06.4XAA] 09/24/2023 Yes    Spinal stenosis of lumbar region [M48.061] 09/24/2023 Yes    Closed fracture of third lumbar vertebra [S32.039A] 09/23/2023 Yes    Radius fracture [S52.90XA] 09/23/2023 Yes    Closed right hip fracture [S72.001A] 09/23/2023 Yes    Displaced fracture of body of right talus, initial encounter for closed fracture [S92.121A] 09/23/2023 Yes      Problems Resolved During this Admission:     His motor exam is stable. His back pain is currently controlled.  Current plan for OR with Dr. Diaz on Wednesday  Continue flat for now with log roll precautions  Continue Q2 hr neuro exams    MATILDE Jacobson  Neurosurgery  [facility]

## 2023-09-25 NOTE — PLAN OF CARE
09/25/23 1026   Discharge Assessment   Assessment Type Discharge Planning Assessment   Confirmed/corrected address, phone number and insurance Yes   Confirmed Demographics Correct on Facesheet   Source of Information patient   When was your last doctors appointment?   (Patient reports 3 months ago.)   Communicated DEREK with patient/caregiver Yes   Reason For Admission Trauma   People in Home child(aracelis), dependent   Do you expect to return to your current living situation? Yes   Do you have help at home or someone to help you manage your care at home? Yes   Who are your caregiver(s) and their phone number(s)? Mother: Tai Henry: [phone]   Prior to hospitilization cognitive status: Unable to Assess   Current cognitive status: Alert/Oriented   Home Accessibility wheelchair accessible   Home Layout Able to live on 1st floor   Equipment Currently Used at Home none   Readmission within 30 days? No   Patient currently being followed by outpatient case management? No   Do you currently have service(s) that help you manage your care at home? No   Do you take prescription medications? Yes   Do you have prescription coverage? No   Do you have any problems affording any of your prescribed medications? No   Is the patient taking medications as prescribed? yes   Who is going to help you get home at discharge? Patient reports his mother or sister.   How do you get to doctors appointments? car, drives self   Are you on dialysis? No   Do you take coumadin? No   DME Needed Upon Discharge  none   Discharge Plan discussed with: Patient;Parent(s)   Name(s) and Number(s) Mother: Tai Henry: [phone]   Transition of Care Barriers None   Discharge Plan A Home with family   Discharge Plan B Home   OTHER   Name(s) of People in Home Patient resides with his two dependent children (ages 16 and 18)       Patient's PCP is Brayden Welsh.  Patient does not have health insurance but stated he spoke with the hospital financial  counselor already regarding applying for Medicaid.  No barriers to discharge at this time.

## 2023-09-25 NOTE — NURSING
Nurses Note -- 4 Eyes      9/25/2023   5:06 PM      Skin assessed during: Daily Assessment      [x] No Altered Skin Integrity Present    [x]Prevention Measures Documented      [] Yes- Altered Skin Integrity Present or Discovered   [] LDA Added if Not in Epic (Describe Wound)   [] New Altered Skin Integrity was Present on Admit and Documented in LDA   [] Wound Image Taken    Wound Care Consulted? No    Attending Nurse:  Florencia Fong RN/Staff Member:   SANTOS Marks

## 2023-09-25 NOTE — PROGRESS NOTES
[facility]  Pulmonary Critical Care Note    Patient Name: José Henry  MRN: [id number]  Admission Date: 9/23/2023  Hospital Length of Stay: 2 days  Code Status: Full Code  Attending Provider: Tyler Lorenzana Jr., *  Primary Care Provider: Brayden Welsh MD     Subjective:     HPI:   47-year-old male presents to the hospital as a trauma transfer.  By report he fell or slept wall fall from a balcony of approximally 12th and 13th feet.  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  He was evaluated by Neurosurgery who plans to take him to OR for L2-L3 laminectomy on 09/26/2023.  Evaluated by Orthopedic surgery for polytrauma including right hip, talus, and calcaneal fracture, and right radius fracture.  Admitted to ICU for close monitoring and frequent neuro checks.      24 Hour Interval History:  Patient awake and alert.  Pain is controlled.  Hemodynamically stable.    No past medical history on file.    No past surgical history on file.    Social History     Socioeconomic History    Marital status:            Current Outpatient Medications   Medication Instructions    dextroamphetamine-amphetamine 30 mg Tab 1 tablet, Oral, Daily    pantoprazole (PROTONIX) 40 MG tablet 1 tablet, Oral, Daily       Current Inpatient Medications   acetaminophen  650 mg Oral Q4H    calcium-vitamin D3  1 tablet Oral BID    docusate sodium  100 mg Oral BID    methocarbamoL  500 mg Intravenous Q8H    mupirocin   Nasal BID    polyethylene glycol  17 g Oral BID       Current Intravenous Infusions        Review of Systems   Unable to perform ROS: Medical condition          Objective:       Intake/Output Summary (Last 24 hours)  at 9/25/2023 0913  Last data filed at 9/25/2023 0800  Gross per 24 hour   Intake 983.28 ml   Output 1500 ml   Net -516.72 ml           Vital Signs (Most Recent):  Temp: 97.7 °F (36.5 °C) (09/25/23 0800)  Pulse: 78 (09/25/23 0700)  Resp: 18 (09/25/23 0810)  BP: 133/85 (09/25/23 0700)  SpO2: 96 % (09/25/23 0700)  Body mass index is 26.58 kg/m².  Weight: 93.9 kg (207 lb) Vital Signs (24h Range):  Temp:  [97 °F (36.1 °C)-98.6 °F (37 °C)] 97.7 °F (36.5 °C)  Pulse:  [] 78  Resp:  [10-22] 18  SpO2:  [91 %-100 %] 96 %  BP: (122-184)/() 133/85     Physical Exam  HENT:      Head: Normocephalic and atraumatic.   Eyes:      Extraocular Movements: Extraocular movements intact.      Conjunctiva/sclera: Conjunctivae normal.      Pupils: Pupils are equal, round, and reactive to light.   Cardiovascular:      Rate and Rhythm: Normal rate and regular rhythm.      Pulses: Normal pulses.      Heart sounds: Normal heart sounds.   Pulmonary:      Effort: Pulmonary effort is normal. No respiratory distress.      Breath sounds: Normal breath sounds.   Abdominal:      General: Abdomen is flat. Bowel sounds are normal. There is no distension.      Palpations: Abdomen is soft. There is no mass.   Musculoskeletal:      Cervical back: Normal range of motion and neck supple.      Right lower leg: No edema.      Left lower leg: No edema.      Comments: Right lower extremity in line upper extremity wrapped with dressing   Skin:     General: Skin is warm and dry.   Neurological:      Mental Status:  Not obtain as patient is sleeping.    Lines/Drains/Airways       Drain  Duration                  Urethral Catheter 09/24/23 0436 1 day              Peripheral Intravenous Line  Duration                  Peripheral IV - Single Lumen 09/23/23 18 G Left Antecubital 2 days         Peripheral IV - Single Lumen 09/23/23 1837 20 G Anterior;Left Forearm 1 day                    Significant Labs:    Lab Results   Component Value Date    WBC 9.91  09/25/2023    HGB 13.6 (L) 09/25/2023    HCT 40.2 (L) 09/25/2023    MCV 89.3 09/25/2023     09/25/2023           BMP  Lab Results   Component Value Date     (L) 09/25/2023    K 3.7 09/25/2023    CHLORIDE 104 09/25/2023    CO2 18 (L) 09/25/2023    BUN 9.1 09/25/2023    CREATININE 0.77 09/25/2023    CALCIUM 8.8 09/25/2023              Significant Imaging:  I have reviewed the pertinent imaging within the past 24 hours.        Assessment/Plan:     Assessment  Polytrauma right radial fracture, right hip, talus, and calcaneus fracture  L3 burst fracture  Leukocytosis-likely reactive      Plan  Continue frequent neuro checks and ICU monitoring.  Follow-up but other recommendations from Orthopedics and Neurosurgery.  Patient is admitted to the trauma team.  We will continue to follow with you.       Pierre Holman MD  Pulmonary Critical Care Medicine  [facility]

## 2023-09-25 NOTE — NURSING
Nurses Note -- 4 Eyes      9/25/2023   5:17 AM      Skin assessed during: Daily Assessment      [x] No Altered Skin Integrity Present    [x]Prevention Measures Documented      [] Yes- Altered Skin Integrity Present or Discovered   [] LDA Added if Not in Epic (Describe Wound)   [] New Altered Skin Integrity was Present on Admit and Documented in LDA   [] Wound Image Taken    Wound Care Consulted? No    Attending Nurse:  Concepcion Fong RN/Staff Member:  April Iyer RN

## 2023-09-25 NOTE — TERTIARY TRAUMA SURVEY NOTE
TERTIARY TRAUMA SURVEY (TTS)    List Injuries Identified to Date:   1. Right radius fracture  2.  Right hip fracture  3.  Right talus and calcaneus fracture   4. L3 burst fracture with retropulsion epidural    List Operations and Procedures:   1. Pending orthopedics and Neurosurgery    No past surgical history on file.    Incidental findings:   1. None    Past Medical History:   1. Denies    Active Ambulatory Problems     Diagnosis Date Noted    No Active Ambulatory Problems     Resolved Ambulatory Problems     Diagnosis Date Noted    No Resolved Ambulatory Problems     No Additional Past Medical History     No past medical history on file.    Tertiary Physical Exam:     Physical Exam  Constitutional:       Appearance: Normal appearance.   HENT:      Head: Normocephalic and atraumatic.      Nose: Nose normal.   Eyes:      Pupils: Pupils are equal, round, and reactive to light.   Cardiovascular:      Rate and Rhythm: Normal rate.      Pulses: Normal pulses.      Comments: Normal peripheral pulses  Pulmonary:      Effort: Pulmonary effort is normal. No respiratory distress.   Chest:      Chest wall: No tenderness.   Abdominal:      General: Abdomen is flat. Bowel sounds are normal. There is no distension.      Palpations: Abdomen is soft.      Tenderness: There is no abdominal tenderness.   Musculoskeletal:         General: Swelling, tenderness and signs of injury present. No deformity.      Cervical back: Normal range of motion and neck supple. No tenderness.      Comments: Right upper extremity and right lower extremity orthopedic dressing clean dry and intact.  Right hip tender.  All pulses intact.   Skin:     General: Skin is warm and dry.      Capillary Refill: Capillary refill takes less than 2 seconds.      Findings: No lesion.   Neurological:      General: No focal deficit present.      Mental Status: He is alert and oriented to person, place, and time. Mental status is at baseline.   Psychiatric:          Mood and Affect: Mood normal.         Behavior: Behavior normal.         Thought Content: Thought content normal.         Judgment: Judgment normal.         Imaging Review:     Imaging Results              MRI Lumbar Spine Without Contrast (Final result)  Result time 09/23/23 15:40:35      Final result by Desmond Villa MD (09/23/23 15:40:35)                   Impression:      1. L3 vertebral body superior half acute compression deformity with paraspinal soft inflammations.  Combination retropulsed bony fragment and ventral epidural hematoma results in central canal stenosis.    2. Lumbar degenerative disc disease and spondylosis level by level discussed above.      Electronically signed by: Desmond Villa  Date:    09/23/2023  Time:    15:40               Narrative:    EXAMINATION:  MRI LUMBAR SPINE WITHOUT CONTRAST    TECHNIQUE:  Spine fracture, lumbar, traumatic;    COMPARISON:  Outside facility CT abdomen pelvis September 23, 2023.    FINDINGS:  There is acute compression deformity of L3 vertebral body along the superior endplate which involves the anterior and the middle column.  This results in about 20% loss of stature.  Hyperintense edematous signal is within the superior half of the vertebral body on the STIR sequence.  There are anterolateral displaced bony fragments and anterolateral paraspinal soft tissue inflammation.  Along the superior endplate of L3, there is broad retropulsed bony fragment into the spinal canal with AP diameter of 8 mm and transverse diameter of 2.0 mm. Inferior to the retropulsed bony fragment is epidural hematoma on axial image 25 series 18 and the sagittal image 9 series 13.  Combination of retropulsed bony fragment and the ventral epidural hematoma results in central canal stenosis with maximum residual AP diameter of 5.8 mm compared to at the level of L2 with 12.9 mm AP diameter of the thecal sac.  Otherwise, lumbar vertebrae stature and alignment is preserved.  Small  hemangioma involves L2 vertebral body.    The visualized thoracic cord is unremarkable. The conus medullaris terminates at L1.  Disc segmental analysis is given below:    At L1-L2, there is bulging of annulus fibrosis slightly flattens the ventral thecal sac.  Central canal is not stenosed.  There are no narrowings of the neural foramen.    At L2-L3, there is disc bulge with more pronounced left paracentral portion causing ventral thecal sac compression.  There is also some facet arthropathy.  Central canal stenosis is moderate.  There are no significant narrowings of the neural foramen.    At L3-L4, there is disc bulge which slightly flattens the ventral thecal sac.  Bilateral facet arthropathy.  Central canal stenosis is mild.  There are no narrowings of the neural foramen.    At L4-L5, the disc is desiccated with central annular fissure.  Disc compresses the ventral thecal sac.    Bilateral degenerative hypertrophy of the facet joints and ligamentum flavum thickening.  These findings result in moderate central canal stenosis.  There are no narrowings of the neural foramen.    At L5-S1, there is no disc herniation.  Bilateral facet arthropathy.  Central canal is not stenosed.  There are no narrowings of the neural foramen                                       MRI Thoracic Spine Without Contrast (Final result)  Result time 09/23/23 15:33:05      Final result by Jose Antonio Mak MD (09/23/23 15:33:05)                   Impression:      No evidence for acute osseous finding or static listhesis    No significant herniation or bulge.  No canal, cord, or foraminal compromise    Mild thoracic spondylosis.      Electronically signed by: Jose Antonio Mak MD  Date:    09/23/2023  Time:    15:33               Narrative:    EXAMINATION:  MRI thoracic spine without contrast    CLINICAL HISTORY:  Mid back pain, injury    TECHNIQUE:  Multiplanar, multisequence MR imaging of the thoracic spine was performed without  contrast    COMPARISON:  None    FINDINGS:  There is normal thoracic kyphosis.  The vertebral body heights are maintained.  There are multiple Schmorl's node extending to the endplates from the mid to lower thoracic spine.    The visualized cord is normal in size and signal.  There is mild thoracic dextrocurvature noted.    There is multilevel disc desiccation.  The paravertebral soft tissues appear unremarkable.  There is multilevel mild facet arthrosis.  There is no significant herniation or bulge.  There is no central canal, cord, or foraminal compromise                                       CT Ankle (Including Hindfoot) Without Contrast Right (Final result)  Result time 09/23/23 14:21:24      Final result by Jose Antonio Mak MD (09/23/23 14:21:24)                   Impression:      Calcaneus fracture as described above.      Electronically signed by: Jose Antonio Mak MD  Date:    09/23/2023  Time:    14:21               Narrative:    EXAMINATION:  CT of the right ankle    CLINICAL HISTORY:  Fracture    COMPARISON:  None.  Correlation with same day radiograph.    TECHNIQUE:  Routine CT of the right hand: Foot was performed without contrast    Total DLP: 109 mGy.cm    Automatic exposure control was utilized to reduce the patient's dose    FINDINGS:  There is comminuted  mildly displaced right calcaneal fracture noted.  Fracture lines involve the posterior calcaneal tuberosity, calcaneal body, and anterior process of the calcaneus.  There is fracture of the line extending to involve the posterior aspect of the posterior subtalar joint.  Middle subtalar joint appears non involved.  Calcaneocuboid joint is not involved.  There are moderate degenerative changes of the 1st MTP joint                                       X-Ray Hand 3 view Left (Final result)  Result time 09/23/23 13:53:45   Procedure changed from X-Ray Hand 2 View Left     Final result by Desmond Villa MD (09/23/23 13:53:45)                    Impression:      No acute osseous abnormality identified.      Electronically signed by: Desmond Villa  Date:    09/23/2023  Time:    13:53               Narrative:    EXAMINATION:  XR HAND COMPLETE 3 VIEW LEFT    CLINICAL HISTORY:  trauma;    TECHNIQUE:  Three views.    COMPARISON:  Trauma.    FINDINGS:  Osseous and articular surfaces are unremarkable.  There is no acute fracture, dislocation or arthritic change.  Position and alignment is unremarkable.  There is unremarkable mineralization of the bones.  No soft tissue calcifications identified.                                       X-Ray Knee Complete 4 or More Views Left (Final result)  Result time 09/23/23 13:51:33   Procedure changed from X-Ray Knee 1 or 2 View Left     Final result by Desmond Villa MD (09/23/23 13:51:33)                   Impression:      No acute osseous abnormality identified.      Electronically signed by: Desmond Villa  Date:    09/23/2023  Time:    13:51               Narrative:    EXAMINATION:  XR KNEE COMP 4 OR MORE VIEWS LEFT    CLINICAL HISTORY:  trauma;    TECHNIQUE:  Four views    COMPARISON:  None available.    FINDINGS:  The osseous and articular surfaces are unremarkable.  There is no acute fracture, dislocation or arthritic change.  Position and alignment are satisfactory.  There is unremarkable mineralization of the bones.  No soft calcifications identified.                                       X-Ray Pelvis Routine AP (Final result)  Result time 09/23/23 12:31:26      Final result by Jose Antonio Mak MD (09/23/23 12:31:26)                   Impression:      Right subcapital femoral neck fracture.      Electronically signed by: Jose Antonio Mak MD  Date:    09/23/2023  Time:    12:31               Narrative:    EXAMINATION:  Pelvis one view    CLINICAL HISTORY:  Fall, injury, pain    COMPARISON:  None    FINDINGS:  There is minimally displaced right subcapital femoral neck fracture noted.  No other fracture seen.                                        X-Ray Femur Ap/Lat Right (Final result)  Result time 09/23/23 12:27:48      Final result by Jose Antonio Mak MD (09/23/23 12:27:48)                   Impression:      Right subcapital femoral neck fracture.      Electronically signed by: Jose Antonio Mak MD  Date:    09/23/2023  Time:    12:27               Narrative:    EXAMINATION:  Right femur two views    CLINICAL HISTORY:  Fall, injury    COMPARISON:  None    FINDINGS:  There is minimally displaced right subcapital femoral neck fracture noted.  Femoroacetabular joint remains in alignment.                                       X-Ray Wrist Complete Right (Final result)  Result time 09/23/23 11:45:20      Final result by Jose Antonio Mak MD (09/23/23 11:45:20)                   Impression:      Mildly displaced dorsal triquetral fracture.    Nondisplaced distal radius fracture.      Electronically signed by: Jose Antonio Mak MD  Date:    09/23/2023  Time:    11:45               Narrative:    EXAMINATION:  Right wrist three views    CLINICAL HISTORY:  Fall, injury, pain    COMPARISON:  None    FINDINGS:  Nondisplaced fracture of the distal radius is noted.  There is a minimally displaced fracture of the dorsal to the triquetrum.                                       X-Ray Ankle Complete Right (Final result)  Result time 09/23/23 11:45:03      Final result by Desmond Villa MD (09/23/23 11:45:03)                   Impression:      Fractured calcaneus.      Electronically signed by: Desmond Villa  Date:    09/23/2023  Time:    11:45               Narrative:    EXAMINATION:  XR ANKLE COMPLETE 3 VIEW RIGHT    CLINICAL HISTORY:  Injury, unspecified, initial encounter    TECHNIQUE:  Three views    COMPARISON:  None available.    FINDINGS:  There are no fractures of the malleoli and the ankle mortise is intact.  Fracture lines involve the posterior 3rd of the calcaneus.  No definite distortion of the subtalar joint.                                 "       Lab Review:   CBC:  Recent Labs   Lab Result Units 09/23/23  1123 09/24/23  0115 09/25/23  0151   WBC x10(3)/mcL 18.97* 15.17* 9.91   RBC x10(6)/mcL 4.77 4.58* 4.50*   Hgb g/dL 14.5 13.9* 13.6*   Hct % 42.1 41.0* 40.2*   Platelet x10(3)/mcL 243 223 182   MCV fL 88.3 89.5 89.3   MCH pg 30.4 30.3 30.2   MCHC g/dL 34.4 33.9 33.8       CMP:  Recent Labs   Lab Result Units 09/23/23  1123 09/24/23  0115 09/25/23  0151   Calcium Level Total mg/dL 8.8 8.5 8.8   Albumin Level g/dL 4.3 4.0 3.3*   Sodium Level mmol/L 138 137 134*   Potassium Level mmol/L 4.0 4.5 3.7   Carbon Dioxide mmol/L 18* 16* 18*   Blood Urea Nitrogen mg/dL 9.5 9.9 9.1   Creatinine mg/dL 0.69* 0.75 0.77   Alkaline Phosphatase unit/L 58 58 61   Alanine Aminotransferase unit/L 33 34 23   Aspartate Aminotransferase unit/L 50* 54* 26   Bilirubin Total mg/dL 0.8 1.0 0.7       Troponin:  No results for input(s): "TROPONINI" in the last 2160 hours.    ETOH:  No results for input(s): "ETHANOL" in the last 72 hours.     Urine Drug Screen:  No results for input(s): "COCAINE", "OPIATE", "BARBITURATE", "AMPHETAMINE", "FENTANYL", "CANNABINOIDS", "MDMA" in the last 72 hours.    Invalid input(s): "BENZODIAZEPINE", "PHENCYCLIDINE"   Plan:   OR Wednesday with Neurosurgery  Flat with logroll precautions   Q2 neuro exams  Nonweightbearing right upper extremity, nonweightbearing right lower extremity until cleared by Orthopedics  Lovenox on hold due to spinal epidural  Continue ICU level care due to high risk of deterioration with epidural hematoma and spine   Multimodal pain control   Regular diet     Nate Hackett NP  c - [phone]    "

## 2023-09-26 ENCOUNTER — ANESTHESIA EVENT (OUTPATIENT)
Dept: SURGERY | Facility: HOSPITAL | Age: 48
DRG: 956 | End: 2023-09-26
Payer: MEDICAID

## 2023-09-26 LAB
ALBUMIN SERPL-MCNC: 3.5 G/DL (ref 3.5–5)
ALBUMIN/GLOB SERPL: 1.2 RATIO (ref 1.1–2)
ALP SERPL-CCNC: 67 UNIT/L (ref 40–150)
ALT SERPL-CCNC: 23 UNIT/L (ref 0–55)
AST SERPL-CCNC: 21 UNIT/L (ref 5–34)
BASOPHILS # BLD AUTO: 0.02 X10(3)/MCL
BASOPHILS NFR BLD AUTO: 0.2 %
BILIRUB SERPL-MCNC: 0.5 MG/DL
BUN SERPL-MCNC: 8.2 MG/DL (ref 8.9–20.6)
CALCIUM SERPL-MCNC: 8.9 MG/DL (ref 8.4–10.2)
CHLORIDE SERPL-SCNC: 102 MMOL/L (ref 98–107)
CO2 SERPL-SCNC: 16 MMOL/L (ref 22–29)
CREAT SERPL-MCNC: 0.74 MG/DL (ref 0.73–1.18)
EOSINOPHIL # BLD AUTO: 0.03 X10(3)/MCL (ref 0–0.9)
EOSINOPHIL NFR BLD AUTO: 0.3 %
ERYTHROCYTE [DISTWIDTH] IN BLOOD BY AUTOMATED COUNT: 13 % (ref 11.5–17)
GFR SERPLBLD CREATININE-BSD FMLA CKD-EPI: >60 MLS/MIN/1.73/M2
GLOBULIN SER-MCNC: 2.9 GM/DL (ref 2.4–3.5)
GLUCOSE SERPL-MCNC: 106 MG/DL (ref 74–100)
HCT VFR BLD AUTO: 44.2 % (ref 42–52)
HGB BLD-MCNC: 15.1 G/DL (ref 14–18)
IMM GRANULOCYTES # BLD AUTO: 0.09 X10(3)/MCL (ref 0–0.04)
IMM GRANULOCYTES NFR BLD AUTO: 0.8 %
LYMPHOCYTES # BLD AUTO: 0.78 X10(3)/MCL (ref 0.6–4.6)
LYMPHOCYTES NFR BLD AUTO: 6.8 %
MCH RBC QN AUTO: 29.9 PG (ref 27–31)
MCHC RBC AUTO-ENTMCNC: 34.2 G/DL (ref 33–36)
MCV RBC AUTO: 87.5 FL (ref 80–94)
MONOCYTES # BLD AUTO: 1.02 X10(3)/MCL (ref 0.1–1.3)
MONOCYTES NFR BLD AUTO: 8.8 %
NEUTROPHILS # BLD AUTO: 9.61 X10(3)/MCL (ref 2.1–9.2)
NEUTROPHILS NFR BLD AUTO: 83.1 %
NRBC BLD AUTO-RTO: 0 %
PLATELET # BLD AUTO: 232 X10(3)/MCL (ref 130–400)
PMV BLD AUTO: 9.7 FL (ref 7.4–10.4)
POTASSIUM SERPL-SCNC: 4 MMOL/L (ref 3.5–5.1)
PROT SERPL-MCNC: 6.4 GM/DL (ref 6.4–8.3)
RBC # BLD AUTO: 5.05 X10(6)/MCL (ref 4.7–6.1)
SODIUM SERPL-SCNC: 135 MMOL/L (ref 136–145)
WBC # SPEC AUTO: 11.55 X10(3)/MCL (ref 4.5–11.5)

## 2023-09-26 PROCEDURE — 25000003 PHARM REV CODE 250: Performed by: SURGERY

## 2023-09-26 PROCEDURE — 20000000 HC ICU ROOM

## 2023-09-26 PROCEDURE — 63600175 PHARM REV CODE 636 W HCPCS: Performed by: SURGERY

## 2023-09-26 PROCEDURE — 85025 COMPLETE CBC W/AUTO DIFF WBC: CPT | Performed by: NURSE PRACTITIONER

## 2023-09-26 PROCEDURE — 63600175 PHARM REV CODE 636 W HCPCS: Performed by: NURSE PRACTITIONER

## 2023-09-26 PROCEDURE — 80053 COMPREHEN METABOLIC PANEL: CPT | Performed by: NURSE PRACTITIONER

## 2023-09-26 PROCEDURE — 25000003 PHARM REV CODE 250: Performed by: NURSE PRACTITIONER

## 2023-09-26 PROCEDURE — 63600175 PHARM REV CODE 636 W HCPCS

## 2023-09-26 PROCEDURE — 94799 UNLISTED PULMONARY SVC/PX: CPT

## 2023-09-26 PROCEDURE — 25000003 PHARM REV CODE 250

## 2023-09-26 PROCEDURE — 25000003 PHARM REV CODE 250: Performed by: NEUROLOGICAL SURGERY

## 2023-09-26 RX ORDER — HYDROMORPHONE HYDROCHLORIDE 2 MG/ML
0.4 INJECTION, SOLUTION INTRAMUSCULAR; INTRAVENOUS; SUBCUTANEOUS EVERY 5 MIN PRN
Status: CANCELLED | OUTPATIENT
Start: 2023-09-26

## 2023-09-26 RX ORDER — SODIUM CHLORIDE, SODIUM LACTATE, POTASSIUM CHLORIDE, CALCIUM CHLORIDE 600; 310; 30; 20 MG/100ML; MG/100ML; MG/100ML; MG/100ML
INJECTION, SOLUTION INTRAVENOUS CONTINUOUS
Status: CANCELLED | OUTPATIENT
Start: 2023-09-26

## 2023-09-26 RX ORDER — ONDANSETRON 2 MG/ML
4 INJECTION INTRAMUSCULAR; INTRAVENOUS ONCE AS NEEDED
Status: CANCELLED | OUTPATIENT
Start: 2023-09-26 | End: 2035-02-22

## 2023-09-26 RX ORDER — THIAMINE HCL 100 MG
100 TABLET ORAL DAILY
Status: DISCONTINUED | OUTPATIENT
Start: 2023-09-27 | End: 2023-10-02

## 2023-09-26 RX ORDER — MIDAZOLAM HYDROCHLORIDE 1 MG/ML
2 INJECTION INTRAMUSCULAR; INTRAVENOUS ONCE AS NEEDED
Status: CANCELLED | OUTPATIENT
Start: 2023-09-26 | End: 2035-02-22

## 2023-09-26 RX ORDER — SODIUM CHLORIDE 9 MG/ML
INJECTION, SOLUTION INTRAVENOUS CONTINUOUS
Status: DISCONTINUED | OUTPATIENT
Start: 2023-09-26 | End: 2023-09-27

## 2023-09-26 RX ORDER — SYRING-NEEDL,DISP,INSUL,0.3 ML 29 G X1/2"
296 SYRINGE, EMPTY DISPOSABLE MISCELLANEOUS ONCE
Status: COMPLETED | OUTPATIENT
Start: 2023-09-26 | End: 2023-09-26

## 2023-09-26 RX ORDER — LORAZEPAM 1 MG/1
1 TABLET ORAL EVERY 4 HOURS PRN
Status: DISCONTINUED | OUTPATIENT
Start: 2023-09-26 | End: 2023-10-02

## 2023-09-26 RX ORDER — LACTULOSE 10 G/15ML
15 SOLUTION ORAL 3 TIMES DAILY
Status: COMPLETED | OUTPATIENT
Start: 2023-09-26 | End: 2023-09-26

## 2023-09-26 RX ORDER — SODIUM CHLORIDE, SODIUM LACTATE, POTASSIUM CHLORIDE, CALCIUM CHLORIDE 600; 310; 30; 20 MG/100ML; MG/100ML; MG/100ML; MG/100ML
INJECTION, SOLUTION INTRAVENOUS CONTINUOUS
Status: DISCONTINUED | OUTPATIENT
Start: 2023-09-26 | End: 2023-10-02

## 2023-09-26 RX ORDER — FOLIC ACID 1 MG/1
1 TABLET ORAL DAILY
Status: DISCONTINUED | OUTPATIENT
Start: 2023-09-27 | End: 2023-10-02

## 2023-09-26 RX ORDER — LIDOCAINE HYDROCHLORIDE 10 MG/ML
1 INJECTION, SOLUTION EPIDURAL; INFILTRATION; INTRACAUDAL; PERINEURAL ONCE
Status: CANCELLED | OUTPATIENT
Start: 2023-09-26 | End: 2023-09-26

## 2023-09-26 RX ADMIN — METHOCARBAMOL 1000 MG: 500 TABLET ORAL at 08:09

## 2023-09-26 RX ADMIN — METHOCARBAMOL 1000 MG: 500 TABLET ORAL at 03:09

## 2023-09-26 RX ADMIN — Medication 1 TABLET: at 08:09

## 2023-09-26 RX ADMIN — LACTULOSE 15 G: 10 SOLUTION ORAL at 09:09

## 2023-09-26 RX ADMIN — SODIUM CHLORIDE: 9 INJECTION, SOLUTION INTRAVENOUS at 03:09

## 2023-09-26 RX ADMIN — ONDANSETRON 4 MG: 2 INJECTION INTRAMUSCULAR; INTRAVENOUS at 08:09

## 2023-09-26 RX ADMIN — MUPIROCIN: 20 OINTMENT TOPICAL at 08:09

## 2023-09-26 RX ADMIN — ACETAMINOPHEN 650 MG: 325 TABLET, FILM COATED ORAL at 01:09

## 2023-09-26 RX ADMIN — GABAPENTIN 300 MG: 300 CAPSULE ORAL at 03:09

## 2023-09-26 RX ADMIN — BE HEALTH MAGNESIUM CITRATE ORAL SOLUTION - LEMON 296 ML: 1.75 LIQUID ORAL at 05:09

## 2023-09-26 RX ADMIN — OXYCODONE HYDROCHLORIDE 10 MG: 10 TABLET ORAL at 08:09

## 2023-09-26 RX ADMIN — OXYCODONE HYDROCHLORIDE 10 MG: 10 TABLET ORAL at 10:09

## 2023-09-26 RX ADMIN — GABAPENTIN 300 MG: 300 CAPSULE ORAL at 08:09

## 2023-09-26 RX ADMIN — FOLIC ACID: 5 INJECTION, SOLUTION INTRAMUSCULAR; INTRAVENOUS; SUBCUTANEOUS at 09:09

## 2023-09-26 RX ADMIN — Medication 6 MG: at 08:09

## 2023-09-26 RX ADMIN — OXYCODONE HYDROCHLORIDE 10 MG: 10 TABLET ORAL at 05:09

## 2023-09-26 RX ADMIN — MORPHINE SULFATE 4 MG: 4 INJECTION, SOLUTION INTRAMUSCULAR; INTRAVENOUS at 01:09

## 2023-09-26 RX ADMIN — LORAZEPAM 1 MG: 1 TABLET ORAL at 09:09

## 2023-09-26 RX ADMIN — ACETAMINOPHEN 650 MG: 325 TABLET, FILM COATED ORAL at 10:09

## 2023-09-26 RX ADMIN — BISACODYL 10 MG: 10 SUPPOSITORY RECTAL at 06:09

## 2023-09-26 RX ADMIN — ONDANSETRON 4 MG: 2 INJECTION INTRAMUSCULAR; INTRAVENOUS at 01:09

## 2023-09-26 RX ADMIN — ACETAMINOPHEN 650 MG: 325 TABLET, FILM COATED ORAL at 05:09

## 2023-09-26 RX ADMIN — MORPHINE SULFATE 4 MG: 4 INJECTION, SOLUTION INTRAMUSCULAR; INTRAVENOUS at 08:09

## 2023-09-26 RX ADMIN — MORPHINE SULFATE 4 MG: 4 INJECTION, SOLUTION INTRAMUSCULAR; INTRAVENOUS at 04:09

## 2023-09-26 NOTE — PROGRESS NOTES
"No acute events overnight.  In bed/bedrest currently.  Asked by Dr. Stover to assist with patient care.  From orthopedic standpoint, patient with multiple fractures including R hip, R distal radius, R calcaneus.  Planning non-op treatment R wrist, calcaneus. Will need R JESSIE when stable.    Vital Signs  Temp: 98.1 °F (36.7 °C)  Temp Source: Oral  Pulse: 92  Heart Rate Source: Monitor, Continuous  Resp: 17  SpO2: 95 %  Pulse Oximetry Type: Continuous  Device (Oxygen Therapy): room air  BP: (!) 132/95  BP Location: Right arm  BP Method: Automatic  Patient Position: Lying  Arousal Level: opens eyes spontaneously  Height and Weight  Height: 6' 2" (188 cm)  Height Method: Stated  Weight: 93.9 kg (207 lb)  Weight Method: Bed Scale  Dosing Weight: 93.9 kg (207 lb)  BSA (Calculated - sq m): 2.21 sq meters  BMI (Calculated): 26.6  Weight in (lb) to have BMI = 25: 194.3]    +FHL/EHL  BCR distally  Dressing c/d/i  SILT distally    Recent Lab Results         09/26/23  0140        Albumin/Globulin Ratio 1.2       Albumin 3.5       Alkaline Phosphatase 67       ALT 23       AST 21       Baso # 0.02       Basophil % 0.2       BILIRUBIN TOTAL 0.5       BUN 8.2       Calcium 8.9       Chloride 102       CO2 16       Creatinine 0.74       eGFR >60       Eos # 0.03       Eosinophil % 0.3       Globulin, Total 2.9       Glucose 106       Hematocrit 44.2       Hemoglobin 15.1       Immature Grans (Abs) 0.09       Immature Granulocytes 0.8       Lymph # 0.78       LYMPH % 6.8       MCH 29.9       MCHC 34.2       MCV 87.5       Mono # 1.02       Mono % 8.8       MPV 9.7       Neut # 9.61       Neut % 83.1       nRBC 0.0       Platelets 232       Potassium 4.0       PROTEIN TOTAL 6.4       RBC 5.05       RDW 13.0       Sodium 135       WBC 11.55               A/P:  Status post fall with R DR (non-op), R calc (non-op), R femoral neck - planning JESSIE when stable - likely Friday  R/B/A discussed in detail with patient  All questions answered, no " guarantees made  To OR on Friday  Plan for US BLE post op from lumbar spine surgery

## 2023-09-26 NOTE — PROGRESS NOTES
[facility] - [facility]  Pulmonary Critical Care Note    Patient Name: José Henry  MRN: [id number]  Admission Date: 9/23/2023  Hospital Length of Stay: 3 days  Code Status: Full Code  Attending Provider: Tyler Lorenzana Jr., *  Primary Care Provider: Brayden Welsh MD     Subjective:     HPI:   47-year-old male presents to the hospital as a trauma transfer.  By report he fell or slept wall fall from a balcony of approximally 12th and 13th feet.  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  He was evaluated by Neurosurgery who plans to take him to OR for L2-L3 laminectomy on 09/26/2023.  Evaluated by Orthopedic surgery for polytrauma including right hip, talus, and calcaneal fracture, and right radius fracture.  Admitted to ICU for close monitoring and frequent neuro checks.      24 Hour Interval History:  Patient awake and alert.  Pain is controlled.  Hemodynamically stable.    No past medical history on file.    No past surgical history on file.    Social History     Socioeconomic History    Marital status:            Current Outpatient Medications   Medication Instructions    dextroamphetamine-amphetamine 30 mg Tab 1 tablet, Oral, Daily    pantoprazole (PROTONIX) 40 MG tablet 1 tablet, Oral, Daily       Current Inpatient Medications   acetaminophen  650 mg Oral Q4H    calcium-vitamin D3  1 tablet Oral BID    docusate sodium  100 mg Oral BID    gabapentin  300 mg Oral TID    methocarbamoL  1,000 mg Oral TID    mupirocin   Nasal BID    polyethylene glycol  17 g Oral BID       Current Intravenous Infusions        Review of Systems   Unable to perform ROS: Medical condition   Constitutional:  Negative for chills,  fever and malaise/fatigue.   HENT:  Negative for sinus pain.    Respiratory:  Negative for cough, hemoptysis, sputum production, shortness of breath, wheezing and stridor.    Cardiovascular:  Negative for chest pain, palpitations, orthopnea, claudication and leg swelling.   Gastrointestinal:  Negative for constipation, heartburn and vomiting.   Musculoskeletal:  Positive for back pain and joint pain. Negative for falls, myalgias and neck pain.   Skin:  Negative for rash.   Neurological:  Negative for dizziness, speech change, focal weakness, seizures, loss of consciousness and weakness.   Psychiatric/Behavioral:  Negative for depression and suicidal ideas. The patient is not nervous/anxious.           Objective:       Intake/Output Summary (Last 24 hours) at 9/26/2023 0855  Last data filed at 9/26/2023 0800  Gross per 24 hour   Intake 240 ml   Output 1550 ml   Net -1310 ml           Vital Signs (Most Recent):  Temp: 98.2 °F (36.8 °C) (09/26/23 0800)  Pulse: 94 (09/26/23 0800)  Resp: 18 (09/26/23 0845)  BP: (!) 154/111 (09/26/23 0800)  SpO2: 95 % (09/26/23 0800)  Body mass index is 26.58 kg/m².  Weight: 93.9 kg (207 lb) Vital Signs (24h Range):  Temp:  [97.7 °F (36.5 °C)-98.4 °F (36.9 °C)] 98.2 °F (36.8 °C)  Pulse:  [] 94  Resp:  [14-25] 18  SpO2:  [93 %-99 %] 95 %  BP: (126-159)/() 154/111     Physical Exam  Constitutional:       General: He is not in acute distress.     Appearance: Normal appearance. He is normal weight. He is not ill-appearing, toxic-appearing or diaphoretic.   HENT:      Head: Normocephalic and atraumatic.      Right Ear: External ear normal.      Left Ear: External ear normal.      Nose: No congestion or rhinorrhea.      Mouth/Throat:      Mouth: Mucous membranes are moist.      Pharynx: Oropharynx is clear. No oropharyngeal exudate or posterior oropharyngeal erythema.   Eyes:      General: No scleral icterus.        Right eye: No discharge.         Left eye: No discharge.       Extraocular Movements: Extraocular movements intact.      Pupils: Pupils are equal, round, and reactive to light.   Neck:      Vascular: No carotid bruit.   Cardiovascular:      Rate and Rhythm: Normal rate and regular rhythm.      Heart sounds: Normal heart sounds. No murmur heard.     No gallop.   Pulmonary:      Effort: No respiratory distress.      Breath sounds: Normal breath sounds. No stridor. No wheezing, rhonchi or rales.   Chest:      Chest wall: No tenderness.   Abdominal:      General: Abdomen is flat. Bowel sounds are normal. There is no distension.      Palpations: Abdomen is soft. There is no mass.      Tenderness: There is no abdominal tenderness. There is no guarding or rebound.   Musculoskeletal:         General: No swelling, tenderness, deformity or signs of injury.      Cervical back: No rigidity or tenderness.      Right lower leg: No edema.      Left lower leg: No edema.   Lymphadenopathy:      Cervical: No cervical adenopathy.   Skin:     Coloration: Skin is not jaundiced.      Findings: No bruising, lesion or rash.   Neurological:      Mental Status: He is alert and oriented to person, place, and time.      Cranial Nerves: No cranial nerve deficit.      Sensory: No sensory deficit.      Motor: No weakness.      Coordination: Coordination normal.      Gait: Gait normal.      Deep Tendon Reflexes: Reflexes normal.   Psychiatric:         Mood and Affect: Mood normal.         Behavior: Behavior normal.         Thought Content: Thought content normal.         Judgment: Judgment normal.       HENT:      Head: Normocephalic and atraumatic.   Eyes:      Extraocular Movements: Extraocular movements intact.      Conjunctiva/sclera: Conjunctivae normal.      Pupils: Pupils are equal, round, and reactive to light.   Cardiovascular:      Rate and Rhythm: Normal rate and regular rhythm.      Pulses: Normal pulses.      Heart sounds: Normal heart sounds.   Pulmonary:      Effort: Pulmonary effort is normal.  No respiratory distress.      Breath sounds: Normal breath sounds.   Abdominal:      General: Abdomen is flat. Bowel sounds are normal. There is no distension.      Palpations: Abdomen is soft. There is no mass.   Musculoskeletal:      Cervical back: Normal range of motion and neck supple.      Right lower leg: No edema.      Left lower leg: No edema.      Comments: Right lower extremity in line upper extremity wrapped with dressing   Skin:     General: Skin is warm and dry.   Neurological:      Mental Status:     Lines/Drains/Airways       Drain  Duration                  Urethral Catheter 09/24/23 0436 2 days              Peripheral Intravenous Line  Duration                  Peripheral IV - Single Lumen 09/23/23 18 G Left Antecubital 3 days         Peripheral IV - Single Lumen 09/23/23 1837 20 G Anterior;Left Forearm 2 days                    Significant Labs:    Lab Results   Component Value Date    WBC 11.55 (H) 09/26/2023    HGB 15.1 09/26/2023    HCT 44.2 09/26/2023    MCV 87.5 09/26/2023     09/26/2023           BMP  Lab Results   Component Value Date     (L) 09/26/2023    K 4.0 09/26/2023    CHLORIDE 102 09/26/2023    CO2 16 (L) 09/26/2023    BUN 8.2 (L) 09/26/2023    CREATININE 0.74 09/26/2023    CALCIUM 8.9 09/26/2023              Significant Imaging:  I have reviewed the pertinent imaging within the past 24 hours.        Assessment/Plan:     Assessment  Polytrauma right radial fracture, right hip, talus, and calcaneus fracture  L3 burst fracture  Leukocytosis-likely reactive.  Slowly improving      Plan  Appears stable to transfer out of ICU  Surgery later this week       Pierre Holman MD  Pulmonary Critical Care Medicine  [facility] - [facility]

## 2023-09-26 NOTE — PROGRESS NOTES
[facility] PROGRESS NOTE    HD# 3  Admission Summary:   In brief, José Henry is a 47 y.o. male admitted on 9/23/2023 following  fell or slept wall fall from a balcony of approximally 12th and 13th feet.  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  GCS 15.  Family at bedside.  Denies any past medical history.  Pain is moderately controlled.  He has a sugar-tong splint to the right upper extremity and a short-leg splint to the right lower extremity.  His vital signs are stable.  Orthopedics and neurosurgery aware of the patient.  Both believe these may be operative  injuries pending further evaluation.  He was MRIs that are pending as well.     Interval history:     AFVSS. Maintains in spinal precautions. C/o constipation. WBC 11.55 hgb 15    Consults:   Neurosurgery  Orthopedic surgery Injuries:  Right talus fx  Right radius fx  [id number] body fx  Pelvic fx    [x]Problems list reviewed Operations/Procedures:  none     Past medical history:  denies    Medications: [] Medications reviewed/updated   Home Meds:    Current Outpatient Medications   Medication Instructions    dextroamphetamine-amphetamine 30 mg Tab 1 tablet, Oral, Daily    pantoprazole (PROTONIX) 40 MG tablet 1 tablet, Oral, Daily      Scheduled Meds:    acetaminophen  650 mg Oral Q4H    calcium-vitamin D3  1 tablet Oral BID    docusate sodium  100 mg Oral BID    gabapentin  300 mg Oral TID    methocarbamoL  1,000 mg Oral TID    mupirocin   Nasal BID    polyethylene glycol  17 g Oral BID     Continuous Infusions:       PRN Meds: bisacodyL, melatonin, morphine, ondansetron, oxyCODONE     Vitals:  VITAL SIGNS: 24 HR MIN & MAX LAST   Temp  Min: 97.7 °F (36.5 °C)  Max: 98.4  "°F (36.9 °C)  98.4 °F (36.9 °C)   BP  Min: 131/96  Max: 159/100  (!) 134/98    Pulse  Min: 70  Max: 105  103    Resp  Min: 12  Max: 25  18    SpO2  Min: 93 %  Max: 99 %  96 %      HT: 6' 2" (188 cm)  WT: 93.9 kg (207 lb)  BMI: 26.6   Ideal Body Weight (IBW), Male: 190 lb  % Ideal Body Weight, Male (lb): 108.95 %        General  Exam: NAD     Neuro/Psych  GCS: 15 (E 4) (V 5) (M 6)  Exam: cn2 -12 intact 55/ all extremities sensation intact  ICP monitor: No  ICP treatment: ICP Treatment: N/A  C-Collar: No    Plan:   Strict bedrest   TLSO  Q2h Neurochecks   Pain control PRN   NSGY following, plan for OR  , ICU care postop      HEENT  Exam: perrl    Plan:   monitor     Pulmonary  Vitals: Resp  Av.1  Min: 12  Max: 25  SpO2  Av %  Min: 93 %  Max: 99 %    Ventilator/Oxygen Settings:            PaO2/FiO2 ratio (if ventilated):   RSBI RR/TV (if ventilated):      ABG:   No results for input(s): "PH", "PO2", "PCO2", "HCO3", "BE" in the last 168 hours.     CXR:    No results found in the last 24 hours.      Rib fractures: none  Chest Tube: None     Exam: bilateral breath sounds with no increased WOB     Plan:     IS with supplemental O2 PRN       Incentive Spirometry/RT Treatments: 750-1000     Cardiovascular  Vitals: Pulse  Av  Min: 70  Max: 105  BP  Min: 131/96  Max: 159/100  No results for input(s): "TROPONINI", "CKTOTAL", "CKMB", "BNP" in the last 168 hours.  Vasoactive Agents: None  Exam: +S1/S2 RRR    Plan:   Continuous tele, Monitor BP/HR      Renal  Recent Labs     23  0115 23  0151 23  0140   BUN 9.9 9.1 8.2*   CREATININE 0.75 0.77 0.74         Recent Labs     23  1746 23  2121 23  0117 23  0435   LACTIC 0.6 0.7 0.5 0.6         Intake/Output - Last 3 Shifts          06 0659    P.O. 500 240     I.V. (mL/kg) 483.3 (5.1)      Total Intake(mL/kg) 983.3 (10.5) 240 (2.6)     Urine (mL/kg/hr) 1650 (0.7) 1575 " "(0.7) 325 (0.5)    Stool  0     Total Output 1650 1575 325    Net -666.7 -1335 -325           Stool Occurrence  0 x              Intake/Output Summary (Last 24 hours) at 2023 1345  Last data filed at 2023 1200  Gross per 24 hour   Intake 240 ml   Output 1500 ml   Net -1260 ml           Harrison: Yes     Plan:   Monitor urine output     FEN/GI  Recent Labs     23  01123  01523  0140    134* 135*   K 4.5 3.7 4.0   CO2 16* 18* 16*   CALCIUM 8.5 8.8 8.9   ALBUMIN 4.0 3.3* 3.5   BILITOT 1.0 0.7 0.5   AST 54* 26 21   ALKPHOS 58 61 67   ALT 34 23 23         Diet: Regular diet     Last BM: unknown    Abdominal Exam: soft nt nd    Plan:   NPO mn for OR   Bowel regimen, consider relistor if no BM  Trend and replace electrolytes as needed      Heme/Onc  Recent Labs     23  1518 23  01523  0140   HGB  --  13.9* 13.6* 15.1   HCT  --  41.0* 40.2* 44.2   PLT  --  223 182 232   PTT 27.5  --   --   --    INR 1.0  --   --   --          Transfusions (over past 24h): None    Plan:   H/h stable monitor daily     ID  Temp  Av.1 °F (36.7 °C)  Min: 97.7 °F (36.5 °C)  Max: 98.4 °F (36.9 °C)      Recent Labs     23  01123  01523  0140   WBC 15.17* 9.91 11.55*         Cultures: Antibiotics:     1.      Plan:   Trend WBC/ monitor fever trend      Endocrine  Recent Labs     23  01123  01523  0140   GLUCOSE 53* 85 106*        No results for input(s): "POCTGLUCOSE" in the last 72 hours.     Plan:   Monitor glucose     Insulin treatment: no insulin     Musculoskeletal/Wounds  Weight bearing status:   RUE: NWB  LUE: WBAT  RLE: NWB  LLE: WBAT    [] Tertiary exam performed    Extremity/wound exam: VICKERS but with pain on ROM.   Plan:   Ortho following, will need operative care of  Rt hip and R wrist  Tentative plan for        Precautions  Fall and Spinal     Prophylaxis  Seizure: Not indicated.  DVT: Defer chemoprophylaxis to " Neurosurgery , holding for OR   GI: Not indicated. Tolerating ordered diet.     Lines/drains/airway [] LDA reviewed/updated   Lines/Drains/Airways       Drain  Duration                  Urethral Catheter 09/24/23 0436 2 days              Peripheral Intravenous Line  Duration                  Peripheral IV - Single Lumen 09/23/23 18 G Left Antecubital 3 days         Peripheral IV - Single Lumen 09/23/23 1837 20 G Anterior;Left Forearm 2 days                    Plan:  Maintain johnston due to OR      Restraints  Face to face evaluation of need for restraints on rounds today:   Currently restrained? No.        Disposition  Unchanged. Continue ongoing ICU level care, eval for downgrade postoperatively.           Amelia Vergara [facility]   Trauma/Acute Care Surgery  [facility]  C: [phone]

## 2023-09-26 NOTE — NURSING
Nurses Note -- 4 Eyes      9/26/2023   5:26 AM      Skin assessed during: Daily Assessment      [x] No Altered Skin Integrity Present    [x]Prevention Measures Documented      [] Yes- Altered Skin Integrity Present or Discovered   [] LDA Added if Not in Epic (Describe Wound)   [] New Altered Skin Integrity was Present on Admit and Documented in LDA   [] Wound Image Taken    Wound Care Consulted? No    Attending Nurse:  Concepcion Fong RN/Staff Member:  SANTOS Johnson

## 2023-09-26 NOTE — NURSING
Nurses Note -- 4 Eyes      9/26/2023   10:56 AM      Skin assessed during: Daily Assessment      [x] No Altered Skin Integrity Present    [x]Prevention Measures Documented      [] Yes- Altered Skin Integrity Present or Discovered   [] LDA Added if Not in Epic (Describe Wound)   [] New Altered Skin Integrity was Present on Admit and Documented in LDA   [] Wound Image Taken    Wound Care Consulted? No    Attending Nurse:  Florencia Fong RN/Staff Member:   SANTOS Fernandes

## 2023-09-26 NOTE — ANESTHESIA PREPROCEDURE EVALUATION
09/26/2023  José Henry is a 47 y.o., male , who presents for the following:    Procedure: FUSION, SPINE, LUMBAR, PLIF, USING COMPUTER-ASSISTED NAVIGATION (Spine Lumbar) - L1-L5 fixation // L2-L3 laminectomy // NTI // medtronic // O-arm   Anesthesia type: General   Diagnosis: Closed burst fracture of lumbar vertebra, initial encounter [S32.001A]   Pre-op diagnosis: Closed burst fracture of lumbar vertebra, initial encounter [S32.001A]   Location: [address] OR   Surgeons: Kiera Diaz MD     ** + N/V this am, successfully treated w/ Reglan. Pt reports nausea has past. He believes the nausea was related to his constipation medications given earlier      HPI:   47-year-old male presents to the hospital as a trauma transfer.  By report he fell or slept wall fall from a balcony of approximally 12th and 13th feet.  He was found to have right hip fracture, burst fracture of L3 with retropulsion, central cord stenosis based on CT , comminuted fracture of the calcaneus with minimally displaced fracture of the talus.  He was evaluated by Neurosurgery who plans to take him to OR for L2-L3 laminectomy on 09/26/2023.  Evaluated by Orthopedic surgery for polytrauma including right hip, talus, and calcaneal fracture, and right radius fracture.  Admitted to ICU for close monitoring     LAB:  09/24 0115 09/25 0151 09/26 0140    HGB 13.9 Low      13.6 Low      15.1       WBC 15.17 High      9.91     11.55 High        Platelets 223     182     232            134 Low      135 Low        K+ 4.5     3.7     4.0       Creatinine 0.75     0.77     0.74       Glucose 53 Low      85     106 High        INR --     --     --            Pre-op Assessment    I have reviewed the Patient Summary Reports.     I have reviewed the Nursing Notes. I have reviewed the NPO Status.   I have reviewed the Medications.     Review  of Systems  Anesthesia Hx:  No previous Anesthesia  Denies Family Hx of Anesthesia complications.   Denies Personal Hx of Anesthesia complications.   Social:  Smoker, Alcohol Use    Cardiovascular:  Cardiovascular Normal     Pulmonary:  Pulmonary Normal    Endocrine:  Endocrine Normal        Physical Exam  General: Alert and Oriented    Airway:  Mallampati: II   Mouth Opening: Normal  TM Distance: Normal  Tongue: Normal  Neck ROM: Normal ROM    Dental:  Intact    Chest/Lungs:  Normal Respiratory Rate    Heart:  Rate: Normal  Rhythm: Regular Rhythm        Anesthesia Plan  Type of Anesthesia, risks & benefits discussed:    Anesthesia Type: Gen ETT  Intra-op Monitoring Plan: Standard ASA Monitors and Art Line  Post Op Pain Control Plan: IV/PO Opioids PRN and multimodal analgesia  Induction:  IV  Airway Plan: Direct and Video, Post-Induction  Informed Consent: Informed consent signed with the Patient and all parties understand the risks and agree with anesthesia plan.  All questions answered. Patient consented to blood products? Yes  ASA Score: 3  Day of Surgery Review of History & Physical: H&P Update referred to the surgeon/provider.    Ready For Surgery From Anesthesia Perspective.     .

## 2023-09-27 ENCOUNTER — ANESTHESIA (OUTPATIENT)
Dept: SURGERY | Facility: HOSPITAL | Age: 48
DRG: 956 | End: 2023-09-27
Payer: MEDICAID

## 2023-09-27 LAB
ALBUMIN SERPL-MCNC: 3.7 G/DL (ref 3.5–5)
ALBUMIN SERPL-MCNC: 3.9 G/DL (ref 3.5–5)
ALBUMIN/GLOB SERPL: 1.2 RATIO (ref 1.1–2)
ALBUMIN/GLOB SERPL: 1.7 RATIO (ref 1.1–2)
ALP SERPL-CCNC: 50 UNIT/L (ref 40–150)
ALP SERPL-CCNC: 70 UNIT/L (ref 40–150)
ALT SERPL-CCNC: 21 UNIT/L (ref 0–55)
ALT SERPL-CCNC: 25 UNIT/L (ref 0–55)
AST SERPL-CCNC: 21 UNIT/L (ref 5–34)
AST SERPL-CCNC: 48 UNIT/L (ref 5–34)
BASOPHILS # BLD AUTO: 0.03 X10(3)/MCL
BASOPHILS # BLD AUTO: 0.04 X10(3)/MCL
BASOPHILS NFR BLD AUTO: 0.2 %
BASOPHILS NFR BLD AUTO: 0.5 %
BILIRUB SERPL-MCNC: 0.6 MG/DL
BILIRUB SERPL-MCNC: 0.8 MG/DL
BUN SERPL-MCNC: 17.2 MG/DL (ref 8.9–20.6)
BUN SERPL-MCNC: 25.5 MG/DL (ref 8.9–20.6)
CALCIUM SERPL-MCNC: 8.7 MG/DL (ref 8.4–10.2)
CALCIUM SERPL-MCNC: 9.6 MG/DL (ref 8.4–10.2)
CHLORIDE SERPL-SCNC: 95 MMOL/L (ref 98–107)
CHLORIDE SERPL-SCNC: 98 MMOL/L (ref 98–107)
CO2 SERPL-SCNC: 25 MMOL/L (ref 22–29)
CO2 SERPL-SCNC: 29 MMOL/L (ref 22–29)
CREAT SERPL-MCNC: 0.86 MG/DL (ref 0.73–1.18)
CREAT SERPL-MCNC: 1.39 MG/DL (ref 0.73–1.18)
EOSINOPHIL # BLD AUTO: 0.01 X10(3)/MCL (ref 0–0.9)
EOSINOPHIL # BLD AUTO: 0.02 X10(3)/MCL (ref 0–0.9)
EOSINOPHIL NFR BLD AUTO: 0.1 %
EOSINOPHIL NFR BLD AUTO: 0.2 %
ERYTHROCYTE [DISTWIDTH] IN BLOOD BY AUTOMATED COUNT: 12.9 % (ref 11.5–17)
ERYTHROCYTE [DISTWIDTH] IN BLOOD BY AUTOMATED COUNT: 13 % (ref 11.5–17)
GFR SERPLBLD CREATININE-BSD FMLA CKD-EPI: >60 MLS/MIN/1.73/M2
GFR SERPLBLD CREATININE-BSD FMLA CKD-EPI: >60 MLS/MIN/1.73/M2
GLOBULIN SER-MCNC: 2.3 GM/DL (ref 2.4–3.5)
GLOBULIN SER-MCNC: 3.1 GM/DL (ref 2.4–3.5)
GLUCOSE SERPL-MCNC: 151 MG/DL (ref 74–100)
GLUCOSE SERPL-MCNC: 154 MG/DL (ref 74–100)
GROUP & RH: NORMAL
HCT VFR BLD AUTO: 39.1 % (ref 42–52)
HCT VFR BLD AUTO: 48 % (ref 42–52)
HGB BLD-MCNC: 13.8 G/DL (ref 14–18)
HGB BLD-MCNC: 16.6 G/DL (ref 14–18)
IMM GRANULOCYTES # BLD AUTO: 0.05 X10(3)/MCL (ref 0–0.04)
IMM GRANULOCYTES # BLD AUTO: 0.12 X10(3)/MCL (ref 0–0.04)
IMM GRANULOCYTES NFR BLD AUTO: 0.7 %
IMM GRANULOCYTES NFR BLD AUTO: 0.9 %
INDIRECT COOMBS GEL: NORMAL
INR PPP: 1
LYMPHOCYTES # BLD AUTO: 0.32 X10(3)/MCL (ref 0.6–4.6)
LYMPHOCYTES # BLD AUTO: 0.72 X10(3)/MCL (ref 0.6–4.6)
LYMPHOCYTES NFR BLD AUTO: 4.3 %
LYMPHOCYTES NFR BLD AUTO: 5.6 %
MCH RBC QN AUTO: 29.7 PG (ref 27–31)
MCH RBC QN AUTO: 30.5 PG (ref 27–31)
MCHC RBC AUTO-ENTMCNC: 34.6 G/DL (ref 33–36)
MCHC RBC AUTO-ENTMCNC: 35.3 G/DL (ref 33–36)
MCV RBC AUTO: 85.9 FL (ref 80–94)
MCV RBC AUTO: 86.5 FL (ref 80–94)
MONOCYTES # BLD AUTO: 0.87 X10(3)/MCL (ref 0.1–1.3)
MONOCYTES # BLD AUTO: 1.82 X10(3)/MCL (ref 0.1–1.3)
MONOCYTES NFR BLD AUTO: 11.8 %
MONOCYTES NFR BLD AUTO: 14.1 %
NEUTROPHILS # BLD AUTO: 10.18 X10(3)/MCL (ref 2.1–9.2)
NEUTROPHILS # BLD AUTO: 6.1 X10(3)/MCL (ref 2.1–9.2)
NEUTROPHILS NFR BLD AUTO: 79 %
NEUTROPHILS NFR BLD AUTO: 82.6 %
NRBC BLD AUTO-RTO: 0 %
NRBC BLD AUTO-RTO: 0 %
PLATELET # BLD AUTO: 233 X10(3)/MCL (ref 130–400)
PLATELET # BLD AUTO: 317 X10(3)/MCL (ref 130–400)
PMV BLD AUTO: 9.5 FL (ref 7.4–10.4)
PMV BLD AUTO: 9.9 FL (ref 7.4–10.4)
POTASSIUM SERPL-SCNC: 4 MMOL/L (ref 3.5–5.1)
POTASSIUM SERPL-SCNC: 4.2 MMOL/L (ref 3.5–5.1)
PROT SERPL-MCNC: 6.2 GM/DL (ref 6.4–8.3)
PROT SERPL-MCNC: 6.8 GM/DL (ref 6.4–8.3)
PROTHROMBIN TIME: 13.2 SECONDS (ref 12.5–14.5)
RBC # BLD AUTO: 4.52 X10(6)/MCL (ref 4.7–6.1)
RBC # BLD AUTO: 5.59 X10(6)/MCL (ref 4.7–6.1)
SODIUM SERPL-SCNC: 137 MMOL/L (ref 136–145)
SODIUM SERPL-SCNC: 138 MMOL/L (ref 136–145)
SPECIMEN OUTDATE: NORMAL
WBC # SPEC AUTO: 12.89 X10(3)/MCL (ref 4.5–11.5)
WBC # SPEC AUTO: 7.39 X10(3)/MCL (ref 4.5–11.5)

## 2023-09-27 PROCEDURE — 37000009 HC ANESTHESIA EA ADD 15 MINS: Performed by: NEUROLOGICAL SURGERY

## 2023-09-27 PROCEDURE — 22842 PR POSTERIOR SEGMENTAL INSTRUMENTATION 3-6 VRT SEG: ICD-10-PCS | Mod: ,,, | Performed by: NEUROLOGICAL SURGERY

## 2023-09-27 PROCEDURE — 20936 SP BONE AGRFT LOCAL ADD-ON: CPT | Mod: ,,, | Performed by: NEUROLOGICAL SURGERY

## 2023-09-27 PROCEDURE — 22325 PR OPEN POST RX LUMB VERT FX,1 LVL: ICD-10-PCS | Mod: 51,,, | Performed by: NEUROLOGICAL SURGERY

## 2023-09-27 PROCEDURE — 25000003 PHARM REV CODE 250: Performed by: SURGERY

## 2023-09-27 PROCEDURE — 27800903 OPTIME MED/SURG SUP & DEVICES OTHER IMPLANTS: Performed by: NEUROLOGICAL SURGERY

## 2023-09-27 PROCEDURE — 63600175 PHARM REV CODE 636 W HCPCS: Performed by: SURGERY

## 2023-09-27 PROCEDURE — 36000712 HC OR TIME LEV V 1ST 15 MIN: Performed by: NEUROLOGICAL SURGERY

## 2023-09-27 PROCEDURE — 25000003 PHARM REV CODE 250: Performed by: NURSE PRACTITIONER

## 2023-09-27 PROCEDURE — 36620 INSERTION CATHETER ARTERY: CPT

## 2023-09-27 PROCEDURE — 80053 COMPREHEN METABOLIC PANEL: CPT | Performed by: NURSE PRACTITIONER

## 2023-09-27 PROCEDURE — 71000033 HC RECOVERY, INTIAL HOUR: Performed by: NEUROLOGICAL SURGERY

## 2023-09-27 PROCEDURE — D9220A PRA ANESTHESIA: ICD-10-PCS | Mod: ANES,,, | Performed by: ANESTHESIOLOGY

## 2023-09-27 PROCEDURE — C1713 ANCHOR/SCREW BN/BN,TIS/BN: HCPCS | Performed by: NEUROLOGICAL SURGERY

## 2023-09-27 PROCEDURE — 63600175 PHARM REV CODE 636 W HCPCS: Performed by: NEUROLOGICAL SURGERY

## 2023-09-27 PROCEDURE — 36620 INSERTION CATHETER ARTERY: CPT | Mod: 59,,, | Performed by: ANESTHESIOLOGY

## 2023-09-27 PROCEDURE — 22842 INSERT SPINE FIXATION DEVICE: CPT | Mod: ,,, | Performed by: NEUROLOGICAL SURGERY

## 2023-09-27 PROCEDURE — 25000003 PHARM REV CODE 250

## 2023-09-27 PROCEDURE — D9220A PRA ANESTHESIA: Mod: CRNA,,, | Performed by: NURSE ANESTHETIST, CERTIFIED REGISTERED

## 2023-09-27 PROCEDURE — P9047 ALBUMIN (HUMAN), 25%, 50ML: HCPCS | Mod: JZ,JG

## 2023-09-27 PROCEDURE — 25000003 PHARM REV CODE 250: Performed by: ANESTHESIOLOGY

## 2023-09-27 PROCEDURE — D9220A PRA ANESTHESIA: Mod: ANES,,, | Performed by: ANESTHESIOLOGY

## 2023-09-27 PROCEDURE — 20936 PR AUTOGRAFT SPINE SURGERY LOCAL FROM SAME INCISION: ICD-10-PCS | Mod: ,,, | Performed by: NEUROLOGICAL SURGERY

## 2023-09-27 PROCEDURE — 63600175 PHARM REV CODE 636 W HCPCS: Performed by: NURSE PRACTITIONER

## 2023-09-27 PROCEDURE — 61783 SCAN PROC SPINAL: CPT | Mod: ,,, | Performed by: NEUROLOGICAL SURGERY

## 2023-09-27 PROCEDURE — 85610 PROTHROMBIN TIME: CPT | Performed by: NURSE PRACTITIONER

## 2023-09-27 PROCEDURE — 85025 COMPLETE CBC W/AUTO DIFF WBC: CPT | Performed by: NURSE PRACTITIONER

## 2023-09-27 PROCEDURE — 27201423 OPTIME MED/SURG SUP & DEVICES STERILE SUPPLY: Performed by: NEUROLOGICAL SURGERY

## 2023-09-27 PROCEDURE — 37000008 HC ANESTHESIA 1ST 15 MINUTES: Performed by: NEUROLOGICAL SURGERY

## 2023-09-27 PROCEDURE — 25000003 PHARM REV CODE 250: Performed by: NEUROLOGICAL SURGERY

## 2023-09-27 PROCEDURE — 63600175 PHARM REV CODE 636 W HCPCS

## 2023-09-27 PROCEDURE — 36620 ARTERIAL: ICD-10-PCS | Mod: 59,,, | Performed by: ANESTHESIOLOGY

## 2023-09-27 PROCEDURE — 22612 ARTHRD PST TQ 1NTRSPC LUMBAR: CPT | Mod: ,,, | Performed by: NEUROLOGICAL SURGERY

## 2023-09-27 PROCEDURE — 20930 PR ALLOGRAFT FOR SPINE SURGERY ONLY MORSELIZED: ICD-10-PCS | Mod: ,,, | Performed by: NEUROLOGICAL SURGERY

## 2023-09-27 PROCEDURE — 22612 PR ARTHRODESIS, POST/POSTLAT, SNGL INTERSPACE, LUMBAR: ICD-10-PCS | Mod: ,,, | Performed by: NEUROLOGICAL SURGERY

## 2023-09-27 PROCEDURE — 61783 PR STEREOTACTIC COMP ASSIST PROC,SPINAL: ICD-10-PCS | Mod: ,,, | Performed by: NEUROLOGICAL SURGERY

## 2023-09-27 PROCEDURE — 22614 PR ARTHRODESIS, POST/POSTLAT, SNGL INTERSPACE, EA ADDTL: ICD-10-PCS | Mod: ,,, | Performed by: NEUROLOGICAL SURGERY

## 2023-09-27 PROCEDURE — 86900 BLOOD TYPING SEROLOGIC ABO: CPT | Performed by: SURGERY

## 2023-09-27 PROCEDURE — 36000713 HC OR TIME LEV V EA ADD 15 MIN: Performed by: NEUROLOGICAL SURGERY

## 2023-09-27 PROCEDURE — 20000000 HC ICU ROOM

## 2023-09-27 PROCEDURE — 20930 SP BONE ALGRFT MORSEL ADD-ON: CPT | Mod: ,,, | Performed by: NEUROLOGICAL SURGERY

## 2023-09-27 PROCEDURE — 22325 TREAT SPINE FRACTURE: CPT | Mod: 51,,, | Performed by: NEUROLOGICAL SURGERY

## 2023-09-27 PROCEDURE — 27000221 HC OXYGEN, UP TO 24 HOURS

## 2023-09-27 PROCEDURE — D9220A PRA ANESTHESIA: ICD-10-PCS | Mod: CRNA,,, | Performed by: NURSE ANESTHETIST, CERTIFIED REGISTERED

## 2023-09-27 PROCEDURE — 22614 ARTHRD PST TQ 1NTRSPC EA ADD: CPT | Mod: ,,, | Performed by: NEUROLOGICAL SURGERY

## 2023-09-27 DEVICE — PUTTY GRAFTON DBM 10X1CM: Type: IMPLANTABLE DEVICE | Site: SPINE LUMBAR | Status: FUNCTIONAL

## 2023-09-27 DEVICE — SCREW HORIZON SOLERA 6.5X50MM: Type: IMPLANTABLE DEVICE | Site: SPINE LUMBAR | Status: FUNCTIONAL

## 2023-09-27 DEVICE — PUTTY BONE GRAFTON DBM 10CC: Type: IMPLANTABLE DEVICE | Site: SPINE LUMBAR | Status: FUNCTIONAL

## 2023-09-27 DEVICE — IMPLANTABLE DEVICE: Type: IMPLANTABLE DEVICE | Site: SPINE LUMBAR | Status: FUNCTIONAL

## 2023-09-27 DEVICE — SET SCREW HORIZON SOLERA 5.5-6: Type: IMPLANTABLE DEVICE | Site: SPINE LUMBAR | Status: FUNCTIONAL

## 2023-09-27 DEVICE — GRAFT BONE CANCELS 4-10MM 30CC: Type: IMPLANTABLE DEVICE | Site: SPINE LUMBAR | Status: FUNCTIONAL

## 2023-09-27 RX ORDER — LIDOCAINE HYDROCHLORIDE 20 MG/ML
INJECTION, SOLUTION EPIDURAL; INFILTRATION; INTRACAUDAL; PERINEURAL
Status: DISCONTINUED | OUTPATIENT
Start: 2023-09-27 | End: 2023-09-27

## 2023-09-27 RX ORDER — ALBUMIN HUMAN 250 G/1000ML
SOLUTION INTRAVENOUS CONTINUOUS PRN
Status: DISCONTINUED | OUTPATIENT
Start: 2023-09-27 | End: 2023-09-27

## 2023-09-27 RX ORDER — PHENYLEPHRINE HCL IN 0.9% NACL 1 MG/10 ML
SYRINGE (ML) INTRAVENOUS
Status: DISCONTINUED | OUTPATIENT
Start: 2023-09-27 | End: 2023-09-27

## 2023-09-27 RX ORDER — HEPARIN 100 UNIT/ML
5 SYRINGE INTRAVENOUS
Status: DISCONTINUED | OUTPATIENT
Start: 2023-09-27 | End: 2023-10-16 | Stop reason: HOSPADM

## 2023-09-27 RX ORDER — CEFAZOLIN SODIUM 1 G/3ML
INJECTION, POWDER, FOR SOLUTION INTRAMUSCULAR; INTRAVENOUS
Status: DISCONTINUED | OUTPATIENT
Start: 2023-09-27 | End: 2023-09-27

## 2023-09-27 RX ORDER — METOCLOPRAMIDE HYDROCHLORIDE 5 MG/ML
10 INJECTION INTRAMUSCULAR; INTRAVENOUS EVERY 6 HOURS PRN
Status: DISCONTINUED | OUTPATIENT
Start: 2023-09-27 | End: 2023-10-16 | Stop reason: HOSPADM

## 2023-09-27 RX ORDER — ACETAMINOPHEN 10 MG/ML
1000 INJECTION, SOLUTION INTRAVENOUS ONCE
Status: COMPLETED | OUTPATIENT
Start: 2023-09-27 | End: 2023-09-28

## 2023-09-27 RX ORDER — FENTANYL CITRATE 50 UG/ML
INJECTION, SOLUTION INTRAMUSCULAR; INTRAVENOUS
Status: DISCONTINUED | OUTPATIENT
Start: 2023-09-27 | End: 2023-09-27

## 2023-09-27 RX ORDER — PROPOFOL 10 MG/ML
VIAL (ML) INTRAVENOUS CONTINUOUS PRN
Status: DISCONTINUED | OUTPATIENT
Start: 2023-09-27 | End: 2023-09-27

## 2023-09-27 RX ORDER — DEXMEDETOMIDINE HYDROCHLORIDE 100 UG/ML
20 INJECTION, SOLUTION INTRAVENOUS ONCE
Status: COMPLETED | OUTPATIENT
Start: 2023-09-27 | End: 2023-09-27

## 2023-09-27 RX ORDER — ROCURONIUM BROMIDE 10 MG/ML
INJECTION, SOLUTION INTRAVENOUS
Status: DISCONTINUED | OUTPATIENT
Start: 2023-09-27 | End: 2023-09-27

## 2023-09-27 RX ORDER — CEFAZOLIN SODIUM 1 G/3ML
INJECTION, POWDER, FOR SOLUTION INTRAMUSCULAR; INTRAVENOUS
Status: DISCONTINUED | OUTPATIENT
Start: 2023-09-27 | End: 2023-09-27 | Stop reason: HOSPADM

## 2023-09-27 RX ORDER — HYDROMORPHONE HYDROCHLORIDE 2 MG/ML
INJECTION, SOLUTION INTRAMUSCULAR; INTRAVENOUS; SUBCUTANEOUS
Status: DISCONTINUED | OUTPATIENT
Start: 2023-09-27 | End: 2023-09-27

## 2023-09-27 RX ORDER — MIDAZOLAM HYDROCHLORIDE 1 MG/ML
2 INJECTION INTRAMUSCULAR; INTRAVENOUS
Status: DISCONTINUED | OUTPATIENT
Start: 2023-09-27 | End: 2023-10-13

## 2023-09-27 RX ORDER — CEFAZOLIN SODIUM 2 G/50ML
2 SOLUTION INTRAVENOUS ONCE
Status: DISCONTINUED | OUTPATIENT
Start: 2023-09-27 | End: 2023-09-27

## 2023-09-27 RX ORDER — SUCCINYLCHOLINE CHLORIDE 20 MG/ML
INJECTION INTRAMUSCULAR; INTRAVENOUS
Status: DISCONTINUED | OUTPATIENT
Start: 2023-09-27 | End: 2023-09-27

## 2023-09-27 RX ORDER — ACETAMINOPHEN 10 MG/ML
INJECTION, SOLUTION INTRAVENOUS
Status: DISCONTINUED | OUTPATIENT
Start: 2023-09-27 | End: 2023-09-27

## 2023-09-27 RX ORDER — DEXAMETHASONE SODIUM PHOSPHATE 4 MG/ML
INJECTION, SOLUTION INTRA-ARTICULAR; INTRALESIONAL; INTRAMUSCULAR; INTRAVENOUS; SOFT TISSUE
Status: DISCONTINUED | OUTPATIENT
Start: 2023-09-27 | End: 2023-09-27

## 2023-09-27 RX ORDER — MIDAZOLAM HYDROCHLORIDE 1 MG/ML
INJECTION INTRAMUSCULAR; INTRAVENOUS
Status: DISCONTINUED | OUTPATIENT
Start: 2023-09-27 | End: 2023-09-27

## 2023-09-27 RX ORDER — PROPOFOL 10 MG/ML
VIAL (ML) INTRAVENOUS
Status: DISCONTINUED | OUTPATIENT
Start: 2023-09-27 | End: 2023-09-27

## 2023-09-27 RX ORDER — DEXMEDETOMIDINE HYDROCHLORIDE 4 UG/ML
0-1.4 INJECTION, SOLUTION INTRAVENOUS CONTINUOUS
Status: DISCONTINUED | OUTPATIENT
Start: 2023-09-27 | End: 2023-09-29

## 2023-09-27 RX ORDER — LIDOCAINE HYDROCHLORIDE AND EPINEPHRINE 5; 5 MG/ML; UG/ML
INJECTION, SOLUTION INFILTRATION; PERINEURAL
Status: DISCONTINUED | OUTPATIENT
Start: 2023-09-27 | End: 2023-09-27 | Stop reason: HOSPADM

## 2023-09-27 RX ADMIN — DEXMEDETOMIDINE HYDROCHLORIDE 0.2 MCG/KG/HR: 400 INJECTION INTRAVENOUS at 08:09

## 2023-09-27 RX ADMIN — CEFAZOLIN 2 G: 330 INJECTION, POWDER, FOR SOLUTION INTRAMUSCULAR; INTRAVENOUS at 12:09

## 2023-09-27 RX ADMIN — Medication 200 MCG: at 07:09

## 2023-09-27 RX ADMIN — DEXMEDETOMIDINE 20 MCG: 200 INJECTION, SOLUTION INTRAVENOUS at 03:09

## 2023-09-27 RX ADMIN — PROPOFOL 150 MCG/KG/MIN: 10 INJECTION, EMULSION INTRAVENOUS at 07:09

## 2023-09-27 RX ADMIN — ROCURONIUM BROMIDE 5 MG: 10 SOLUTION INTRAVENOUS at 07:09

## 2023-09-27 RX ADMIN — MIDAZOLAM HYDROCHLORIDE 2 MG: 1 INJECTION, SOLUTION INTRAMUSCULAR; INTRAVENOUS at 07:09

## 2023-09-27 RX ADMIN — SODIUM CHLORIDE, SODIUM GLUCONATE, SODIUM ACETATE, POTASSIUM CHLORIDE AND MAGNESIUM CHLORIDE: 526; 502; 368; 37; 30 INJECTION, SOLUTION INTRAVENOUS at 07:09

## 2023-09-27 RX ADMIN — Medication 100 MCG: at 08:09

## 2023-09-27 RX ADMIN — ALBUMIN (HUMAN): 12.5 SOLUTION INTRAVENOUS at 08:09

## 2023-09-27 RX ADMIN — ONDANSETRON 4 MG: 2 INJECTION INTRAMUSCULAR; INTRAVENOUS at 12:09

## 2023-09-27 RX ADMIN — FENTANYL CITRATE 50 MCG: 50 INJECTION, SOLUTION INTRAMUSCULAR; INTRAVENOUS at 07:09

## 2023-09-27 RX ADMIN — DEXAMETHASONE SODIUM PHOSPHATE 4 MG: 4 INJECTION, SOLUTION INTRA-ARTICULAR; INTRALESIONAL; INTRAMUSCULAR; INTRAVENOUS; SOFT TISSUE at 08:09

## 2023-09-27 RX ADMIN — SODIUM CHLORIDE, POTASSIUM CHLORIDE, SODIUM LACTATE AND CALCIUM CHLORIDE: 600; 310; 30; 20 INJECTION, SOLUTION INTRAVENOUS at 03:09

## 2023-09-27 RX ADMIN — FENTANYL CITRATE 50 MCG: 50 INJECTION, SOLUTION INTRAMUSCULAR; INTRAVENOUS at 11:09

## 2023-09-27 RX ADMIN — HYDROMORPHONE HYDROCHLORIDE 0.4 MG: 2 INJECTION, SOLUTION INTRAMUSCULAR; INTRAVENOUS; SUBCUTANEOUS at 02:09

## 2023-09-27 RX ADMIN — SODIUM CHLORIDE 1000 ML: 9 INJECTION, SOLUTION INTRAVENOUS at 04:09

## 2023-09-27 RX ADMIN — MUPIROCIN: 20 OINTMENT TOPICAL at 08:09

## 2023-09-27 RX ADMIN — ACETAMINOPHEN 1000 MG: 10 INJECTION, SOLUTION INTRAVENOUS at 01:09

## 2023-09-27 RX ADMIN — HYDROMORPHONE HYDROCHLORIDE 0.2 MG: 2 INJECTION, SOLUTION INTRAMUSCULAR; INTRAVENOUS; SUBCUTANEOUS at 02:09

## 2023-09-27 RX ADMIN — CEFAZOLIN 2 G: 330 INJECTION, POWDER, FOR SOLUTION INTRAMUSCULAR; INTRAVENOUS at 08:09

## 2023-09-27 RX ADMIN — ONDANSETRON 4 MG: 2 INJECTION INTRAMUSCULAR; INTRAVENOUS at 02:09

## 2023-09-27 RX ADMIN — MORPHINE SULFATE 4 MG: 4 INJECTION, SOLUTION INTRAMUSCULAR; INTRAVENOUS at 02:09

## 2023-09-27 RX ADMIN — SUCCINYLCHOLINE CHLORIDE 120 MG: 20 INJECTION, SOLUTION INTRAMUSCULAR; INTRAVENOUS at 07:09

## 2023-09-27 RX ADMIN — MORPHINE SULFATE 4 MG: 4 INJECTION, SOLUTION INTRAMUSCULAR; INTRAVENOUS at 11:09

## 2023-09-27 RX ADMIN — PROPOFOL 200 MG: 10 INJECTION, EMULSION INTRAVENOUS at 07:09

## 2023-09-27 RX ADMIN — ACETAMINOPHEN 650 MG: 325 TABLET, FILM COATED ORAL at 05:09

## 2023-09-27 RX ADMIN — DEXTROSE MONOHYDRATE 1 G: 5 INJECTION INTRAVENOUS at 11:09

## 2023-09-27 RX ADMIN — ALBUMIN (HUMAN): 12.5 SOLUTION INTRAVENOUS at 09:09

## 2023-09-27 RX ADMIN — SODIUM CHLORIDE, POTASSIUM CHLORIDE, SODIUM LACTATE AND CALCIUM CHLORIDE: 600; 310; 30; 20 INJECTION, SOLUTION INTRAVENOUS at 11:09

## 2023-09-27 RX ADMIN — REMIFENTANIL HYDROCHLORIDE 0.2 MCG/KG/MIN: 1 INJECTION, POWDER, LYOPHILIZED, FOR SOLUTION INTRAVENOUS at 07:09

## 2023-09-27 RX ADMIN — PHENYLEPHRINE HYDROCHLORIDE 50 MCG/MIN: 10 INJECTION INTRAVENOUS at 07:09

## 2023-09-27 RX ADMIN — DEXTROSE MONOHYDRATE 1 G: 5 INJECTION INTRAVENOUS at 04:09

## 2023-09-27 RX ADMIN — LIDOCAINE HYDROCHLORIDE 80 MG: 20 INJECTION, SOLUTION EPIDURAL; INFILTRATION; INTRACAUDAL; PERINEURAL at 07:09

## 2023-09-27 RX ADMIN — LORAZEPAM 1 MG: 1 TABLET ORAL at 05:09

## 2023-09-27 NOTE — PROGRESS NOTES
[facility] PROGRESS NOTE    HD# 4  Admission Summary:   In brief, José Henry is a 47 y.o. male admitted on 9/23/2023 following  fell or slept wall fall from a balcony of approximally 12th and 13th feet.  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  GCS 15.  Family at bedside.  Denies any past medical history.  Pain is moderately controlled.  He has a sugar-tong splint to the right upper extremity and a short-leg splint to the right lower extremity.  His vital signs are stable.  Orthopedics and neurosurgery aware of the patient.  Both believe these may be operative  injuries pending further evaluation.  He was MRIs that are pending as well.     Interval history:     AF. HR in 100-120s. Had BM following mag citrate and suppository. Followed by nausea, vomiting and reported diaphoresis.     Consults:   Neurosurgery  Orthopedic surgery Injuries:  Right talus fx  Right radius fx  [id number] body fx  Pelvic fx    [x]Problems list reviewed Operations/Procedures:  To or today      Past medical history:  denies    Medications: [] Medications reviewed/updated   Home Meds:    Current Outpatient Medications   Medication Instructions    dextroamphetamine-amphetamine 30 mg Tab 1 tablet, Oral, Daily    pantoprazole (PROTONIX) 40 MG tablet 1 tablet, Oral, Daily      Scheduled Meds:    acetaminophen  650 mg Oral Q4H    calcium-vitamin D3  1 tablet Oral BID    ceFAZolin (ANCEF) IVPB  2 g Intravenous Once    docusate sodium  100 mg Oral BID    folic acid  1 mg Oral Daily    gabapentin  300 mg Oral TID    methocarbamoL  1,000 mg Oral TID    multivitamin  1 tablet Oral Daily    mupirocin   Nasal BID    polyethylene glycol  17 g Oral BID    thiamine   "100 mg Oral Daily     Continuous Infusions:    dexmedeTOMIDine (Precedex) infusion (titrating)      lactated ringers         PRN Meds: bisacodyL, ceFAZolin, heparin, porcine (PF), LIDOcaine-EPINEPHrine 0.5%-1:200,000, LORazepam, melatonin, metoclopramide HCl, midazolam, morphine, ondansetron, oxyCODONE, thrombin (bovine)     Vitals:  VITAL SIGNS: 24 HR MIN & MAX LAST   Temp  Min: 97.5 °F (36.4 °C)  Max: 98.6 °F (37 °C)  (S)  (pt in surgery)   BP  Min: 123/95  Max: 147/111  (!) 138/97    Pulse  Min: 99  Max: 120  110    Resp  Min: 15  Max: 30  20    SpO2  Min: 93 %  Max: 99 %  96 %      HT: 6' 2" (188 cm)  WT: 93.9 kg (207 lb)  BMI: 26.6   Ideal Body Weight (IBW), Male: 190 lb  % Ideal Body Weight, Male (lb): 108.95 %        General  Exam: NAD     Neuro/Psych  GCS: 15 (E 4) (V 5) (M 6)  Exam: cn2 -12 intact/ all extremities sensation intact  ICP monitor: No  ICP treatment: ICP Treatment: N/A  C-Collar: No    Plan:   Strict bedrest   TLSO  Q2h Neurochecks   Concern for DTs, start versed PRN and precedex   Pain control PRN   To OR today with NSGY     HEENT  Exam: perrl    Plan:   monitor     Pulmonary  Vitals: Resp  Av.8  Min: 15  Max: 30  SpO2  Av %  Min: 93 %  Max: 99 %    Ventilator/Oxygen Settings:            PaO2/FiO2 ratio (if ventilated):   RSBI RR/TV (if ventilated):      ABG:   No results for input(s): "PH", "PO2", "PCO2", "HCO3", "BE" in the last 168 hours.     CXR:    No results found in the last 24 hours.      Rib fractures: none  Chest Tube: None     Exam: bilateral breath sounds with no increased WOB     Plan:     IS with supplemental O2 PRN       Incentive Spirometry/RT Treatments: 750-1000     Cardiovascular  Vitals: Pulse  Av.6  Min: 99  Max: 120  BP  Min: 123/95  Max: 147/111  No results for input(s): "TROPONINI", "CKTOTAL", "CKMB", "BNP" in the last 168 hours.  Vasoactive Agents: None  Exam: +S1/S2 RRR    Plan:   Continuous tele, Monitor BP/HR      Renal  Recent Labs     " "23  01523  0140 23  0232   BUN 9.1 8.2* 17.2   CREATININE 0.77 0.74 0.86         No results for input(s): "LACTIC" in the last 72 hours.      Intake/Output - Last 3 Shifts          06 07 0659  07 0659    P.O. 240 360     I.V. (mL/kg)  1996.4 (21.3) 200 (2.1)    IV Piggyback  1066.6     Total Intake(mL/kg) 240 (2.6) 3423 (36.5) 200 (2.1)    Urine (mL/kg/hr) 1575 (0.7) 695 (0.3) 100 (0.3)    Emesis/NG output  700     Stool 0 0     Total Output 1575 1395 100    Net -1335 +2028 +100           Stool Occurrence 0 x 2 x     Emesis Occurrence  4 x              Intake/Output Summary (Last 24 hours) at 2023 1059  Last data filed at 2023 1043  Gross per 24 hour   Intake 3623.01 ml   Output 1195 ml   Net 2428.01 ml           Harrison: Yes     Plan:   Monitor urine output     FEN/GI  Recent Labs     23  023   * 135* 137   K 3.7 4.0 4.2   CO2 18* 16* 25   CALCIUM 8.8 8.9 9.6   ALBUMIN 3.3* 3.5 3.7   BILITOT 0.7 0.5 0.8   AST 26 21 21   ALKPHOS 61 67 70   ALT 23 23 21         Diet: Regular diet     Last BM:     Abdominal Exam: soft nt nd    Plan:   KUB with ileus vs distal obstruction, having Bms with low clinical suspicion for obstruction   NPO mn for OR   Bowel regimen  Trend and replace electrolytes as needed      Heme/Onc  Recent Labs     23  023   HGB 13.6* 15.1 16.6   HCT 40.2* 44.2 48.0    232 317         Transfusions (over past 24h): None    Plan:   H/h stable monitor daily     ID  Temp  Av.2 °F (36.8 °C)  Min: 97.5 °F (36.4 °C)  Max: 98.6 °F (37 °C)      Recent Labs     23  0151 23  0140 23  0232   WBC 9.91 11.55* 12.89*         Cultures: Antibiotics:     1.      Plan:   Trend WBC/ monitor fever trend      Endocrine  Recent Labs     23  023   GLUCOSE 85 106* 154*        No results for input(s): " ""POCTGLUCOSE" in the last 72 hours.     Plan:   Monitor glucose     Insulin treatment: no insulin     Musculoskeletal/Wounds  Weight bearing status:   RUE: NWB  LUE: WBAT  RLE: NWB  LLE: WBAT    [] Tertiary exam performed    Extremity/wound exam: VICKERS but with pain on ROM.   Plan:   Ortho following, will need operative care of  Rt hip and R wrist  Tentative plan for 9/28  Pt/OT held pending fixation      Precautions  Fall and Spinal     Prophylaxis  Seizure: Not indicated.  DVT: Defer chemoprophylaxis to Neurosurgery , holding for OR   GI: Not indicated. Tolerating ordered diet.     Lines/drains/airway [] LDA reviewed/updated   Lines/Drains/Airways       Drain  Duration                  Urethral Catheter 09/24/23 0436 3 days              Airway  Duration                  Airway - Non-Surgical 09/27/23 0723 Endotracheal Tube <1 day              Arterial Line  Duration             Arterial Line 09/27/23 0820 Left Radial <1 day              Peripheral Intravenous Line  Duration                  Peripheral IV - Single Lumen 09/23/23 18 G Left Antecubital 4 days         Peripheral IV - Single Lumen 09/26/23 2200 Anterior;Left Forearm <1 day         Peripheral IV - Single Lumen 09/27/23 0728 18 G Left Hand <1 day                    Plan:  Maintain johnston due to OR ; eval for DC post op      Restraints  Face to face evaluation of need for restraints on rounds today:   Currently restrained? No.        Disposition  Unchanged. Continue ongoing ICU level care. Will eval postoperatively.           Amelia Vergara [facility]   Trauma/Acute Care Surgery  [facility]  C: [phone]   "

## 2023-09-27 NOTE — OP NOTE
Neurosurgery Operative Note  [facility]      Patient Name: José Henry  MRN: [id number]  CSN:  [id number]  : 1975  Age:47 yrs  Sex:male  Admit Date: 2023    Surgery date: 2023   Surgeon(s) and Role:     * Kiera Diaz MD - Primary  Assistant(s): None    Preop/ Preprocedure Diagnosis:  L3 burst fracture with retropulsion and L2-3 stenosis    Postop/ Postprocedure Diagnosis:  L3 burst fracture with retropulsion and L2-3 stenosis    Surgery:     1)  Posterior lateral fusion with instrumentation from L1 to L5 with Medtronic Solera pedicle screws:         A.  L1-  6.5 x 45 mm pedicle screws bilaterally      B.  L2-  6.5 x 50 mm pedicle screws bilaterally      C.  L3 - 6.5 x 50 mm pedicle screws bilaterally      D.  L4-  Right- 6.5 x 45 mm pedicle screw, Left- 6.5 x 50 mm pedicle screw      E.  L5-  6.5 x 45 mm pedicle screws bilaterally           F.   Placement of bilateral rods measuring 160 mm on right and 170 mm on the left                      spanning from L1 to L5       2)  Decompressive laminectomy at L2-3     3)  Placement of local autograft, allograft, DBM, and [place] Strips bilaterally     4)  Use of Medtronic Stealth navigation and intraoperative O Arm for placement of pedicle screws and to verify final placement of spinal instrumentation     5)  Use of intraoperative C-arm      6)  Use of intraoperative neuromonitoring with impedance testing of lumbar pedicle screws, with confirmed impedance of greater than 20 in each screw      Findings: Intraoperative decompression at L2-3. The final C arm and O arm images demonstrated that the instrumentation was in good position.      Fluids: See anesthesia record  Estimated Blood Loss:  250 cc  Anesthesia Type: General  Blood Products: none  Specimens:  * No specimens in log *    Implants/Grafts:   Implant Name Type Inv. Item Serial No.  Lot No. LRB No. Used Action   6.5X45MM SCREW    [employer]  N/A 5  Implanted   SCREW HORIZON SOLERA 6.5X50MM - [id number]  SCREW HORIZON SOLERA 6.5X50MM  [employer] [place]  N/A 5 Implanted   SET SCREW HORIZON SOLERA 5.5-6 - [id number]  SET SCREW HORIZON SOLERA 5.5-6  [employer] [place]  N/A 10 Implanted   GRAFT BONE CANCELS 4-10MM 30CC - [id number]  GRAFT BONE CANCELS 4-10MM 30CC  [facility] [id number] N/A 1 Implanted   PUTTY BONE DAMON DBM 10CC - [id number]  PUTTY BONE DAMON DBM 10CC [id number] [employer] [place]  N/A 1 Implanted   PUTTY DAMON DBM 10X1CM - [id number]  PUTTY DAMON DBM 10X1CM [id number] [employer] [place]  N/A 1 Implanted   160MM LINDSEY    [employer]  N/A 1 Implanted   170MM LINDSEY    [employer]  N/A 1 Implanted     Drain(s), Tube(s), Packing:  Hemovac drain    Complications: No immediate    Preoperative Indications:   Mr. Henry is a 47 year old healthy male who was sleepwalking and fell from a second-story balcony 4 days ago on 09/23/2023.  He sustained polytrauma including a burst fracture of L3 with retropulsion, right hip fracture, comminuted fracture of the calcaneus with minimal displaced fracture of the talus.  Aside from the limitations in his neurological exam due to his orthopedic injuries, Mr. Henry had a GCS 15 with a normal motor and sensory neurological exam.  A MRI lumbar spine without gadolinium demonstrated an acute L3 burst fracture with 20% loss of height and retropulsion of the superior endplate.  There was also a small acute epidural hematoma posterior to the L3 vertebral body and anterior to the thecal sac.  At L2-3, there was severe stenosis.      I discussed with Mr. Henry and his sister at the bedside that he had an unstable L3 burst fracture that met indications for surgical stabilization.  He was deemed a candidate for surgery, specifically a L1 to L5 posterolateral fusion with L2-3 laminectomy.  I reviewed the risks, benefits, and alternatives of this surgery.  He agreed to proceed.  All questions were answered to his satisfaction.  The patient's  sister signed the consent form since he has been on pain medications.       Operative Procedure:  The patient was brought to the operating room.  General endotracheal intubation was instituted by the anesthesia team. The patient was then turned prone onto a Micky frame with his arms and legs appropriately padded.  Neuromonitoring with SSEP, EMG, and MEP was performed throughout the procedure with no changes from baseline.    It was anticipated that a midline incision be made on the patient's lower back.  A C-arm image was performed that correctly identified the L1 to L5 levels with specific identification of the L3 burst fracture before proceeding.  The patient's back was prepped and draped in a normal sterile fashion.  A timeout was performed that correctly identified the patient, preoperative antibiotics, and procedure.    A solution of 1% Lidocane with epinephrine was infused into the anticipated incisional site.  The initial skin incision was made with an 10 scalpel blade.  Bovie cautery was used for hemostasis and for carrying out this incision to the spinous processes. The Bovie was used for dissection from the midline to the lateral aspects of the bilateral facets.  Cerebellar and Zelpi retractors were placed for visualization.  A C-arm image was performed to identify the pedicles from L1 to L5.    A clamp with the Medtronic Stealth system was placed on the T12 spinous process.  An O arm image was obtained.    With intraoperative guidance from the Medtronic Stealth system and O arm images, bilateral pedicle screws at L1, L2, L3, L4, and L5 were placed.  At each level, the entry point for the screw was determined with a navigated drill.  Then, a series of steps involving the navigated, power-generated [id number] tap, pedicle feeler without a navigated ball probe, pedicle feeler with a navigated ball probe, with final placement of a pedicle screw.  This was performed bilaterally from L1 to L5 with  intraoperative navigation.  Posterior lateral fusion with instrumentation was achieved with the [employer] Solera system.          A.  L1-  6.5 x 45 mm pedicle screws bilaterally      B.  L2-  6.5 x 50 mm pedicle screws bilaterally      C.  L3 - 6.5 x 50 mm pedicle screws bilaterally      D.  L4-  Right- 6.5 x 45 mm pedicle screw, Left- 6.5 x 50 mm pedicle screw      E.  L5-  6.5 x 45 mm pedicle screws bilaterally           F.   Placement of bilateral rods measuring 160 mm on right and 170 mm on the   Left spanning from L1 to L5      Use of intraoperative neuromonitoring with impedance testing was completed for the bilateral L1, L2, L3, L4, and L5 pedicle screws, with confirmed impedance greater than 20 in each screw.  Images with the O arm confirmed that this instrumentation was in good position.      A laminectomy at the L2-3 level was then performed.  A rongeur was used to remove the spinous processes of L2 and L3.  Using a technique with a curved curette and Kerrison punch, this L2-3 laminectomy was completed.    A 160 mm jenna was placed between the L 1, L2, L3, L4, and L5 pedicle screws on the right . A 170 mm jenna was placed between the L 1, L2, L3, L4, and L5 pedicle screws on the left.  The set screw caps were secured.  The surgical wound was copiously irrigated. Decortication was performed with the drill.  Medtronic [place] Strips were placed bilaterally, spanning from L1 to L5 laterally.  A mixture of morselized local autograft, allograft, and DBM was placed laterally along the pedicle screws.       C-arm imaging with AP and lateral views demonstrated that the instrumentation was in proper position. Closure was then in order.  The fascia was infused with a solution of 0.5% Bupivicaine with epinephrine.  A medium size Hemovac drain was placed under the fascia and sutured into place with an 0 Vicryl. The fascia was closed with interrupted 0 Vicryl sutures. The subcutaneous layer was closed with interrupted,  buried 2-0 Vicryl sutures. The skin was everted and closed with staples.  Xeroform gauze, 4x4s, and Medipore tape were placed on top of the skin as the dressing.     The patient was brought back into the supine position and extubated.  He was transported to the PACU for further care and then brought to the ICU for postoperative monitoring.       Keira Diaz MD  Neurosurgery

## 2023-09-27 NOTE — TRANSFER OF CARE
"Anesthesia Transfer of Care Note    Patient: José Henry    Procedure(s) Performed: Procedure(s) (LRB):  FUSION, SPINE, LUMBAR, PLIF, USING COMPUTER-ASSISTED NAVIGATION (N/A)    Patient location: PACU    Anesthesia Type: general    Transport from OR: Transported from OR on room air with adequate spontaneous ventilation    Post pain: adequate analgesia    Post assessment: no apparent anesthetic complications    Post vital signs: stable    Level of consciousness: sedated    Nausea/Vomiting: no nausea/vomiting    Complications: none    Transfer of care protocol was followed      Last vitals:   Visit Vitals  BP (!) 138/97   Pulse 110   Temp 36.7 °C (98.1 °F) (Oral)   Resp 20   Ht 6' 2" (1.88 m)   Wt 93.9 kg (207 lb)   SpO2 96%   BMI 26.58 kg/m²     "

## 2023-09-27 NOTE — ANESTHESIA PROCEDURE NOTES
Arterial    Diagnosis: Spinal stenosis of lumbar region [M48.061], Closed fracture of third lumbar vertebra [S32.039A]    Patient location during procedure: pre-op    Staffing  Authorizing Provider: Cricket Smith MD  Performing Provider: Angel Valencia    Staffing  Performed by: Angel Valencia  Authorized by: Cricket Smith MD    Anesthesiologist was present at the time of the procedure.    Preanesthetic Checklist  Completed: patient identified, IV checked, site marked, risks and benefits discussed, surgical consent, monitors and equipment checked, pre-op evaluation, timeout performed and anesthesia consent givenArterial  Skin Prep: chlorhexidine gluconate  Local Infiltration: lidocaine  Orientation: left  Location: radial    Catheter placement by Anatomical landmarks. Heme positive aspiration all ports. Insertion Attempts: 2  Assessment  Dressing: secured with tape and tegaderm  Patient: Tolerated well

## 2023-09-27 NOTE — ANESTHESIA PROCEDURE NOTES
Intubation    Date/Time: 9/27/2023 7:26 AM    Performed by: Angel Valencia  Authorized by: Cricket Smith MD    Intubation:     Induction:  Rapid sequence induction    Intubated:  Postinduction    Mask Ventilation:  N/a    Attempts:  1    Attempted By:  Student    Method of Intubation:  Video laryngoscopy    Blade:  Marimar 3    Laryngeal View Grade: Grade I - full view of cords      Attempted By (2nd Attempt):  CRNA    Method of Intubation (2nd Attempt):  Direct    Blade (2nd Attempt):  Marimar 4    Laryngeal View Grade (2nd Attempt): Grade IIa - cords partially seen      Difficult Airway Encountered?: No      Complications:  None    Airway Device:  Oral endotracheal tube    Airway Device Size:  7.5    Style/Cuff Inflation:  Cuffed    Inflation Amount (mL):  5    Tube secured:  23    Secured at:  The lips    Placement Verified By:  Capnometry and Colorimetric ETCO2 device    Complicating Factors:  None    Findings Post-Intubation:  BS equal bilateral and atraumatic/condition of teeth unchanged

## 2023-09-27 NOTE — NURSING
Nurses Note -- 4 Eyes      9/26/2023   11:23 PM      Skin assessed during: Admit      [x] No Altered Skin Integrity Present    [x]Prevention Measures Documented      [] Yes- Altered Skin Integrity Present or Discovered   [] LDA Added if Not in Epic (Describe Wound)   [] New Altered Skin Integrity was Present on Admit and Documented in LDA   [] Wound Image Taken    Wound Care Consulted? No    Attending Nurse:  Collette GRAHAM    Second RN/Staff Member:   Anjelica GRAHAM

## 2023-09-27 NOTE — INTERVAL H&P NOTE
The patient has been examined and the H&P has been reviewed:    I concur with the findings and no changes have occurred since H&P was written.    Surgery risks, benefits and alternative options discussed and understood by patient/family.          Active Hospital Problems    Diagnosis  POA    Closed nondisplaced fracture of right calcaneus [S92.001A]  Yes    Closed fracture of right femur [S72.91XA]  Yes    Epidural hematoma [S06.4XAA]  Yes    Spinal stenosis of lumbar region [M48.061]  Yes    Closed fracture of third lumbar vertebra [S32.039A]  Yes    Radius fracture [S52.90XA]  Yes    Closed right hip fracture [S72.001A]  Yes    Displaced fracture of body of right talus, initial encounter for closed fracture [S92.121A]  Yes      Resolved Hospital Problems   No resolved problems to display.

## 2023-09-28 LAB
ALBUMIN SERPL-MCNC: 3.3 G/DL (ref 3.5–5)
ALBUMIN/GLOB SERPL: 1.2 RATIO (ref 1.1–2)
ALP SERPL-CCNC: 46 UNIT/L (ref 40–150)
ALT SERPL-CCNC: 26 UNIT/L (ref 0–55)
AST SERPL-CCNC: 56 UNIT/L (ref 5–34)
BASOPHILS # BLD AUTO: 0.03 X10(3)/MCL
BASOPHILS NFR BLD AUTO: 0.6 %
BILIRUB SERPL-MCNC: 0.6 MG/DL
BUN SERPL-MCNC: 23.4 MG/DL (ref 8.9–20.6)
CALCIUM SERPL-MCNC: 8.5 MG/DL (ref 8.4–10.2)
CHLORIDE SERPL-SCNC: 96 MMOL/L (ref 98–107)
CO2 SERPL-SCNC: 28 MMOL/L (ref 22–29)
CREAT SERPL-MCNC: 1.26 MG/DL (ref 0.73–1.18)
CRP SERPL-MCNC: 187 MG/L
EOSINOPHIL # BLD AUTO: 0.01 X10(3)/MCL (ref 0–0.9)
EOSINOPHIL NFR BLD AUTO: 0.2 %
ERYTHROCYTE [DISTWIDTH] IN BLOOD BY AUTOMATED COUNT: 13.1 % (ref 11.5–17)
GFR SERPLBLD CREATININE-BSD FMLA CKD-EPI: >60 MLS/MIN/1.73/M2
GLOBULIN SER-MCNC: 2.7 GM/DL (ref 2.4–3.5)
GLUCOSE SERPL-MCNC: 158 MG/DL (ref 74–100)
HCT VFR BLD AUTO: 35.7 % (ref 42–52)
HGB BLD-MCNC: 12.6 G/DL (ref 14–18)
IMM GRANULOCYTES # BLD AUTO: 0.04 X10(3)/MCL (ref 0–0.04)
IMM GRANULOCYTES NFR BLD AUTO: 0.7 %
LYMPHOCYTES # BLD AUTO: 0.4 X10(3)/MCL (ref 0.6–4.6)
LYMPHOCYTES NFR BLD AUTO: 7.4 %
MCH RBC QN AUTO: 30.4 PG (ref 27–31)
MCHC RBC AUTO-ENTMCNC: 35.3 G/DL (ref 33–36)
MCV RBC AUTO: 86 FL (ref 80–94)
MONOCYTES # BLD AUTO: 1.3 X10(3)/MCL (ref 0.1–1.3)
MONOCYTES NFR BLD AUTO: 23.9 %
NEUTROPHILS # BLD AUTO: 3.66 X10(3)/MCL (ref 2.1–9.2)
NEUTROPHILS NFR BLD AUTO: 67.2 %
NRBC BLD AUTO-RTO: 0 %
PLATELET # BLD AUTO: 248 X10(3)/MCL (ref 130–400)
PMV BLD AUTO: 9.7 FL (ref 7.4–10.4)
POTASSIUM SERPL-SCNC: 3.8 MMOL/L (ref 3.5–5.1)
PREALB SERPL-MCNC: 11.8 MG/DL (ref 18–45)
PROT SERPL-MCNC: 6 GM/DL (ref 6.4–8.3)
RBC # BLD AUTO: 4.15 X10(6)/MCL (ref 4.7–6.1)
SODIUM SERPL-SCNC: 137 MMOL/L (ref 136–145)
WBC # SPEC AUTO: 5.44 X10(3)/MCL (ref 4.5–11.5)

## 2023-09-28 PROCEDURE — 63600175 PHARM REV CODE 636 W HCPCS: Performed by: NEUROLOGICAL SURGERY

## 2023-09-28 PROCEDURE — 63600175 PHARM REV CODE 636 W HCPCS

## 2023-09-28 PROCEDURE — 85025 COMPLETE CBC W/AUTO DIFF WBC: CPT | Performed by: NURSE PRACTITIONER

## 2023-09-28 PROCEDURE — 25000003 PHARM REV CODE 250: Performed by: NEUROLOGICAL SURGERY

## 2023-09-28 PROCEDURE — 99024 PR POST-OP FOLLOW-UP VISIT: ICD-10-PCS | Mod: ,,, | Performed by: NEUROLOGICAL SURGERY

## 2023-09-28 PROCEDURE — 25000003 PHARM REV CODE 250

## 2023-09-28 PROCEDURE — 99024 POSTOP FOLLOW-UP VISIT: CPT | Mod: ,,, | Performed by: NEUROLOGICAL SURGERY

## 2023-09-28 PROCEDURE — 63600175 PHARM REV CODE 636 W HCPCS: Performed by: NURSE PRACTITIONER

## 2023-09-28 PROCEDURE — 25000003 PHARM REV CODE 250: Performed by: SURGERY

## 2023-09-28 PROCEDURE — 80053 COMPREHEN METABOLIC PANEL: CPT | Performed by: NURSE PRACTITIONER

## 2023-09-28 PROCEDURE — 63600175 PHARM REV CODE 636 W HCPCS: Performed by: SURGERY

## 2023-09-28 PROCEDURE — 86140 C-REACTIVE PROTEIN: CPT | Performed by: NURSE PRACTITIONER

## 2023-09-28 PROCEDURE — 84134 ASSAY OF PREALBUMIN: CPT | Performed by: NURSE PRACTITIONER

## 2023-09-28 PROCEDURE — 25000003 PHARM REV CODE 250: Performed by: NURSE PRACTITIONER

## 2023-09-28 PROCEDURE — 20000000 HC ICU ROOM

## 2023-09-28 RX ORDER — METHOCARBAMOL 100 MG/ML
500 INJECTION, SOLUTION INTRAMUSCULAR; INTRAVENOUS EVERY 8 HOURS
Status: DISPENSED | OUTPATIENT
Start: 2023-09-28 | End: 2023-10-01

## 2023-09-28 RX ORDER — BISACODYL 10 MG
10 SUPPOSITORY, RECTAL RECTAL ONCE
Status: DISCONTINUED | OUTPATIENT
Start: 2023-09-28 | End: 2023-10-02

## 2023-09-28 RX ORDER — HYDROMORPHONE HYDROCHLORIDE 2 MG/ML
1 INJECTION, SOLUTION INTRAMUSCULAR; INTRAVENOUS; SUBCUTANEOUS
Status: DISCONTINUED | OUTPATIENT
Start: 2023-09-28 | End: 2023-10-04

## 2023-09-28 RX ORDER — FAMOTIDINE 10 MG/ML
20 INJECTION INTRAVENOUS 2 TIMES DAILY
Status: DISCONTINUED | OUTPATIENT
Start: 2023-09-28 | End: 2023-10-02

## 2023-09-28 RX ORDER — METHOCARBAMOL 100 MG/ML
500 INJECTION, SOLUTION INTRAMUSCULAR; INTRAVENOUS EVERY 8 HOURS
Status: DISCONTINUED | OUTPATIENT
Start: 2023-09-28 | End: 2023-09-28

## 2023-09-28 RX ADMIN — SODIUM CHLORIDE 1000 ML: 9 INJECTION, SOLUTION INTRAVENOUS at 10:09

## 2023-09-28 RX ADMIN — SODIUM CHLORIDE, POTASSIUM CHLORIDE, SODIUM LACTATE AND CALCIUM CHLORIDE: 600; 310; 30; 20 INJECTION, SOLUTION INTRAVENOUS at 10:09

## 2023-09-28 RX ADMIN — FAMOTIDINE 20 MG: 10 INJECTION, SOLUTION INTRAVENOUS at 09:09

## 2023-09-28 RX ADMIN — DEXTROSE MONOHYDRATE 1 G: 5 INJECTION INTRAVENOUS at 09:09

## 2023-09-28 RX ADMIN — METHOCARBAMOL 500 MG: 100 INJECTION INTRAMUSCULAR; INTRAVENOUS at 09:09

## 2023-09-28 RX ADMIN — MORPHINE SULFATE 4 MG: 4 INJECTION, SOLUTION INTRAMUSCULAR; INTRAVENOUS at 03:09

## 2023-09-28 RX ADMIN — SODIUM CHLORIDE 1000 ML: 9 INJECTION, SOLUTION INTRAVENOUS at 12:09

## 2023-09-28 RX ADMIN — MORPHINE SULFATE 4 MG: 4 INJECTION, SOLUTION INTRAMUSCULAR; INTRAVENOUS at 10:09

## 2023-09-28 RX ADMIN — HYDROMORPHONE HYDROCHLORIDE 1 MG: 2 INJECTION INTRAMUSCULAR; INTRAVENOUS; SUBCUTANEOUS at 07:09

## 2023-09-28 RX ADMIN — DEXTROSE MONOHYDRATE 1 G: 5 INJECTION INTRAVENOUS at 05:09

## 2023-09-28 RX ADMIN — SODIUM CHLORIDE, POTASSIUM CHLORIDE, SODIUM LACTATE AND CALCIUM CHLORIDE: 600; 310; 30; 20 INJECTION, SOLUTION INTRAVENOUS at 02:09

## 2023-09-28 RX ADMIN — SODIUM CHLORIDE 1000 ML: 9 INJECTION, SOLUTION INTRAVENOUS at 06:09

## 2023-09-28 RX ADMIN — MORPHINE SULFATE 4 MG: 4 INJECTION, SOLUTION INTRAMUSCULAR; INTRAVENOUS at 05:09

## 2023-09-28 RX ADMIN — SODIUM CHLORIDE, POTASSIUM CHLORIDE, SODIUM LACTATE AND CALCIUM CHLORIDE: 600; 310; 30; 20 INJECTION, SOLUTION INTRAVENOUS at 05:09

## 2023-09-28 RX ADMIN — HYDROMORPHONE HYDROCHLORIDE 1 MG: 2 INJECTION INTRAMUSCULAR; INTRAVENOUS; SUBCUTANEOUS at 03:09

## 2023-09-28 RX ADMIN — MUPIROCIN: 20 OINTMENT TOPICAL at 09:09

## 2023-09-28 RX ADMIN — FAMOTIDINE 20 MG: 10 INJECTION, SOLUTION INTRAVENOUS at 12:09

## 2023-09-28 RX ADMIN — METHOCARBAMOL 500 MG: 100 INJECTION INTRAMUSCULAR; INTRAVENOUS at 12:09

## 2023-09-28 RX ADMIN — ACETAMINOPHEN 1000 MG: 10 INJECTION, SOLUTION INTRAVENOUS at 12:09

## 2023-09-28 NOTE — PROGRESS NOTES
TRAUMA ICU PROGRESS NOTE    HD# 5  Admission Summary:   In brief, José Henry is a 47 y.o. male admitted on 9/23/2023 following  fell or slept wall fall from a balcony of approximally 12th and 13th feet.  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  GCS 15.  Family at bedside.  Denies any past medical history.  Pain is moderately controlled.  He has a sugar-tong splint to the right upper extremity and a short-leg splint to the right lower extremity.  His vital signs are stable.  Orthopedics and neurosurgery aware of the patient.  Both believe these may be operative  injuries pending further evaluation.  He was MRIs that are pending as well.     Interval history:     TMAX 102. Increased abdominal distention in OR with vomitus thus NGT placed post op with 3.6L out, replacing with IVF. On second liter. UOP averages  75/hr. Patient on low dose precedex and calm and cooperative. Has thirst and appetite     Consults:   Neurosurgery  Orthopedic surgery Injuries:  Right talus fx  Right radius fx  [id number] body fx  Pelvic fx    [x]Problems list reviewed Operations/Procedures:   9/27 PLIF     Past medical history:  ETOH use, denies other     Medications: [] Medications reviewed/updated   Home Meds:    Current Outpatient Medications   Medication Instructions    dextroamphetamine-amphetamine 30 mg Tab 1 tablet, Oral, Daily    pantoprazole (PROTONIX) 40 MG tablet 1 tablet, Oral, Daily      Scheduled Meds:    acetaminophen  650 mg Oral Q4H    calcium-vitamin D3  1 tablet Oral BID    ceFAZolin (ANCEF) IVPB  1 g Intravenous Q8H    docusate sodium  100 mg Oral BID    folic acid  1 mg Oral Daily    gabapentin  300 mg Oral TID    methocarbamoL   "1,000 mg Oral TID    multivitamin  1 tablet Oral Daily    mupirocin   Nasal BID    polyethylene glycol  17 g Oral BID    sodium chloride 0.9%  1,000 mL Intravenous Once    thiamine  100 mg Oral Daily     Continuous Infusions:    dexmedeTOMIDine (Precedex) infusion (titrating) Stopped (23 0302)    lactated ringers 125 mL/hr at 23 0549       PRN Meds: bisacodyL, heparin, porcine (PF), LORazepam, melatonin, metoclopramide HCl, midazolam, morphine, ondansetron, oxyCODONE     Vitals:  VITAL SIGNS: 24 HR MIN & MAX LAST   Temp  Min: 98.6 °F (37 °C)  Max: 102 °F (38.9 °C)  99 °F (37.2 °C)   BP  Min: 92/61  Max: 138/87  96/72    Pulse  Min: 100  Max: 128  106    Resp  Min: 13  Max: 28  17    SpO2  Min: 91 %  Max: 100 %  (!) 93 %      HT: 6' 2" (188 cm)  WT: 93.9 kg (207 lb)  BMI: 26.6   Ideal Body Weight (IBW), Male: 190 lb  % Ideal Body Weight, Male (lb): 108.95 %        General  Exam: NAD     Neuro/Psych  GCS: 15 (E 4) (V 5) (M 6)  Exam: cn2 -12 intact/ all extremities sensation intact, no tremors   ICP monitor: No  ICP treatment: ICP Treatment: N/A  C-Collar: No    Plan:   POD 1 PLIF with hemovac in place   TLSO when HOB elevated  Q4h Neurochecks   CIWA with versed, stop precedex for now   Pain control PRN   NSGY following      HEENT  Exam: perrl    Plan:   monitor     Pulmonary  Vitals: Resp  Av.9  Min: 13  Max: 28  SpO2  Av %  Min: 91 %  Max: 100 %    Ventilator/Oxygen Settings:            PaO2/FiO2 ratio (if ventilated):   RSBI RR/TV (if ventilated):      ABG:   No results for input(s): "PH", "PO2", "PCO2", "HCO3", "BE" in the last 168 hours.     CXR:    No results found in the last 24 hours.      Rib fractures: none  Chest Tube: None     Exam: bilateral breath sounds with no increased WOB     Plan:     CXR nonacute   On RA  IS       Incentive Spirometry/RT Treatments: 750-1000     Cardiovascular  Vitals: Pulse  Av.2  Min: 100  Max: 128  BP  Min: 92/61  Max: 138/87  No results for input(s): " ""TROPONINI", "CKTOTAL", "CKMB", "BNP" in the last 168 hours.  Vasoactive Agents: None  Exam: +S1/S2 sinus tach    Plan:   Continuous tele, Monitor BP/HR   HR improving      Renal  Recent Labs     09/26/23  0140 09/27/23  0232 09/27/23  1806 09/28/23  0039   BUN 8.2* 17.2 25.5* 23.4*   CREATININE 0.74 0.86 1.39* 1.26*         No results for input(s): "LACTIC" in the last 72 hours.      Intake/Output - Last 3 Shifts         09/26 0700 09/27 0659 09/27 0700 09/28 0659 09/28 0700 09/29 0659    P.O. 360      I.V. (mL/kg) 1996.4 (21.3) 2431.1 (25.9)     IV Piggyback 1066.6 2137.3     Total Intake(mL/kg) 3423 (36.5) 4568.3 (48.7)     Urine (mL/kg/hr) 695 (0.3) 1425 (0.6)     Emesis/NG output 700      Drains  4130     Stool 0      Blood  250     Total Output 1395 5805     Net +2028 -1236.7            Stool Occurrence 2 x      Emesis Occurrence 4 x               Intake/Output Summary (Last 24 hours) at 9/28/2023 0711  Last data filed at 9/28/2023 0613  Gross per 24 hour   Intake 4568.34 ml   Output 5805 ml   Net -1236.66 ml           Harrison: No     Plan:   Harrison out post op with spontaneous voiding   mIVF with boluses per NGT output   Monitor BUN/Cr/UOP- SCR increased post op now with downward trend      FEN/GI  Recent Labs     09/26/23  0140 09/27/23  0232 09/27/23  1806 09/28/23  0039   * 137 138 137   K 4.0 4.2 4.0 3.8   CO2 16* 25 29 28   CALCIUM 8.9 9.6 8.7 8.5   ALBUMIN 3.5 3.7 3.9 3.3*   BILITOT 0.5 0.8 0.6 0.6   AST 21 21 48* 56*   ALKPHOS 67 70 50 46   ALT 23 21 25 26         Diet:  NPO    Last BM: 9/27    Abdominal Exam: soft, moderately distended , nontender     Plan:   KUB with ileus vs distal obstruction. No flatus overnight  NGT to LiWS until output decreases and further bowel activity   NPO    Suppository today   Trend and replace electrolytes as needed      Heme/Onc  Recent Labs     09/26/23  0140 09/27/23  0232 09/27/23  1806 09/28/23  0039   HGB 15.1 16.6 13.8* 12.6*   HCT 44.2 48.0 39.1* 35.7* " "   317 233 248   INR  --   --  1.0  --          Transfusions (over past 24h): None    Plan:   H/h stable monitor daily     ID  Temp  Av.6 °F (37.6 °C)  Min: 98.6 °F (37 °C)  Max: 102 °F (38.9 °C)      Recent Labs     23  0140 23  0232 23  1806 23  0039   WBC 11.55* 12.89* 7.39 5.44         Cultures: Antibiotics:     1. Ancef       Plan:   Trend WBC/ monitor fever trend   Ancef while HV is in      Endocrine  Recent Labs     23  0140 23  0232 23  1806 23  0039   GLUCOSE 106* 154* 151* 158*        No results for input(s): "POCTGLUCOSE" in the last 72 hours.     Plan:   Monitor glucose     Insulin treatment: no insulin     Musculoskeletal/Wounds  Weight bearing status:   RUE: NWB  LUE: WBAT  RLE: NWB  LLE: WBAT    [] Tertiary exam performed    Extremity/wound exam: VICKERS but with pain on ROM.   Plan:   Ortho following, will need operative care of  Rt hip and R wrist  Tentative plan for   PT/OT held pending fixation      Precautions  Fall and Spinal     Prophylaxis  Seizure: Not indicated.  DVT: Defer chemoprophylaxis to Neurosurgery , holding due to drain  GI: H2B     Lines/drains/airway [] LDA reviewed/updated   Lines/Drains/Airways       Drain  Duration                  Urethral Catheter 23 0436 4 days         Closed/Suction Drain 23 1345 Tube - 1 Midline Back Accordion 10 Fr. <1 day         NG/OG Tube 23 1730 nasogastric Left nostril <1 day              Peripheral Intravenous Line  Duration                  Peripheral IV - Single Lumen 23 18 G Left Antecubital 5 days         Peripheral IV - Single Lumen 23 2200 Anterior;Left Forearm 1 day         Peripheral IV - Single Lumen 23 0728 18 G Left Hand <1 day                    Plan:  Harrison is out. Maintain other LDAs with routine care      Restraints  Face to face evaluation of need for restraints on rounds today:   Currently restrained? No.    "     Disposition  Unchanged. Continue ongoing ICU level care. Monitor gastric output and abdominal exams. Volume replacement. Monitor fever trends          Amelia Vergara [facility]   Trauma/Acute Care Surgery  [facility]  C: [phone]

## 2023-09-28 NOTE — DISCHARGE INSTRUCTIONS
POSTERIOR THORACO-LUMBAR/ LUMBAR FUSION  DISCHARGE INSTRUCTIONS      1.  Wear your TLSO Brace when sitting upright and when you are out of bed.    Your brace will likely be discontinued after 3 months.      2.   Keep your incision clean and dry.    Your sutures or staples will be removed at your first postoperative follow-up appointment in approximately 14 days.   Place clean gauze and tape over your wound daily until your sutures or staples are removed.  Wait at least 72 hours from the time of your surgery to take a shower.  After showering, pat your incision dry and replace the wound dressing.  Do not immerse your incision in water for 4-6 weeks (e.g. bath tub, hot tub, swimming pool).      3.  Activity restrictions:  No lifting greater than 15 pounds for 3 months.  No bending, stooping, or twisting.  Avoid sitting in one position for over 30 minutes at a time.  No impact exercise or contact sports for at least 3 months.  No driving for at least 2 weeks.  To resume driving short distances (<30 minutes), you must be off of your narcotic medications and be able to comfortably brake suddenly, should the need arise.    Get up and walk.      4.  Contact your Neurosurgeon if the following occurs:  Signs and symptoms of infection, including fever above 101.5 degrees Fahrenheit and/or chills.  Redness, swelling, warmth, or drainage from the incision.  Any lasting changes in sensation, numbness, and/or tingling.  Increased weakness or increased pain.  Swelling of the foot and/or lower leg with calf pain.    Neurosurgery has office hours Monday through Friday, 8:00 AM to 4:00 PM except for holidays. There is an answering service available during non-office hours, with 24 hours neurosurgery coverage.  Report to the Emergency Department if you need immediate medical assistance.      Because you have had a fusion, you are not to take steroidal medications or non-steroidal anti-inflammatory (NSAID) medications such as Motrin/  Ibuprofen or Naprosyn/ Naproxen unless agreed upon with your neurosurgeon.        Please contact Dr. Diaz's office for any questions or concerns.  Typically, your first follow-up appointment after a posterior thoraco-lumbar/ lumbar fusion surgery is approximately 14 days from the date of your operation.  At this time, sutures or staples will be removed.  For your second postoperative appointment in 4-6 weeks, an x-ray of your lumbar spine will be arranged in Radiology before your neurosurgery appointment.

## 2023-09-28 NOTE — NURSING
Nurses Note -- 4 Eyes      9/27/2023   7:32 PM      Skin assessed during: Daily Assessment      [x] No Altered Skin Integrity Present    [x]Prevention Measures Documented      [] Yes- Altered Skin Integrity Present or Discovered   [] LDA Added if Not in Epic (Describe Wound)   [] New Altered Skin Integrity was Present on Admit and Documented in LDA   [] Wound Image Taken    Wound Care Consulted? No    Attending Nurse:  SANTOS Don     Second RN/Staff Member:   SANTOS Parrish

## 2023-09-28 NOTE — PROGRESS NOTES
[facility] - [facility] ICU  Pulmonary Critical Care Note    Patient Name: José Henry  MRN: [id number]  Admission Date: 9/23/2023  Hospital Length of Stay: 5 days  Code Status: Full Code  Attending Provider: Tyler Lorenzana Jr., *  Primary Care Provider: Brayden Welsh MD     Subjective:     HPI:   47-year-old male presents to the hospital as a trauma transfer.  By report he fell or slept wall fall from a balcony of approximally 12th and 13th feet.  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  He was evaluated by Neurosurgery who plans to take him to OR for L2-L3 laminectomy on 09/26/2023.  Evaluated by Orthopedic surgery for polytrauma including right hip, talus, and calcaneal fracture, and right radius fracture.  Admitted to ICU for close monitoring and frequent neuro checks.      24 Hour Interval History:  Patient awake and alert.  Pain is controlled.  Hemodynamically stable.  NG having moderate output.    Past Medical History:   Diagnosis Date    GERD (gastroesophageal reflux disease)     Smoker        History reviewed. No pertinent surgical history.    Social History     Socioeconomic History    Marital status:    Tobacco Use    Smokeless tobacco: Current   Substance and Sexual Activity    Alcohol use: Yes     Alcohol/week: 6.0 standard drinks of alcohol     Types: 6 Cans of beer per week     Comment: drinks 5x per week/ 6 or more beers           Current Outpatient Medications   Medication Instructions    dextroamphetamine-amphetamine 30 mg Tab 1 tablet, Oral, Daily    pantoprazole (PROTONIX) 40 MG tablet 1 tablet, Oral, Daily       Current Inpatient Medications   acetaminophen  650 mg Oral Q4H    bisacodyL   10 mg Rectal Once    calcium-vitamin D3  1 tablet Oral BID    ceFAZolin (ANCEF) IVPB  1 g Intravenous Q8H    folic acid  1 mg Oral Daily    gabapentin  300 mg Oral TID    methocarbamoL  1,000 mg Oral TID    multivitamin  1 tablet Oral Daily    mupirocin   Nasal BID    thiamine  100 mg Oral Daily       Current Intravenous Infusions   dexmedeTOMIDine (Precedex) infusion (titrating) 0.2 mcg/kg/hr (09/28/23 0714)    lactated ringers 125 mL/hr at 09/28/23 0549         Review of Systems   Unable to perform ROS: Medical condition   Constitutional:  Negative for chills, fever and malaise/fatigue.   HENT:  Negative for sinus pain.    Respiratory:  Negative for cough, hemoptysis, sputum production, shortness of breath, wheezing and stridor.    Cardiovascular:  Negative for chest pain, palpitations, orthopnea, claudication and leg swelling.   Gastrointestinal:  Negative for constipation, heartburn and vomiting.   Musculoskeletal:  Negative for falls, myalgias and neck pain.   Skin:  Negative for rash.   Neurological:  Negative for dizziness, speech change, focal weakness, seizures, loss of consciousness and weakness.   Psychiatric/Behavioral:  Negative for depression and suicidal ideas. The patient is not nervous/anxious.           Objective:       Intake/Output Summary (Last 24 hours) at 9/28/2023 0931  Last data filed at 9/28/2023 0714  Gross per 24 hour   Intake 4374.86 ml   Output 5805 ml   Net -1430.14 ml           Vital Signs (Most Recent):  Temp: 99 °F (37.2 °C) (09/28/23 0400)  Pulse: 106 (09/28/23 0600)  Resp: 17 (09/28/23 0600)  BP: 96/72 (09/28/23 0600)  SpO2: (!) 93 % (09/28/23 0600)  Body mass index is 26.58 kg/m².  Weight: 93.9 kg (207 lb) Vital Signs (24h Range):  Temp:  [98.6 °F (37 °C)-102 °F (38.9 °C)] 99 °F (37.2 °C)  Pulse:  [100-128] 106  Resp:  [13-28] 17  SpO2:  [91 %-100 %] 93 %  BP: ()/() 96/72     Physical Exam  Constitutional:       General: He is not in acute distress.     Appearance:  Normal appearance. He is normal weight. He is not ill-appearing, toxic-appearing or diaphoretic.   HENT:      Head: Normocephalic and atraumatic.      Right Ear: External ear normal.      Left Ear: External ear normal.      Nose: No congestion or rhinorrhea.      Mouth/Throat:      Mouth: Mucous membranes are moist.      Pharynx: Oropharynx is clear. No oropharyngeal exudate or posterior oropharyngeal erythema.   Eyes:      General: No scleral icterus.        Right eye: No discharge.         Left eye: No discharge.      Extraocular Movements: Extraocular movements intact.      Pupils: Pupils are equal, round, and reactive to light.   Neck:      Vascular: No carotid bruit.   Cardiovascular:      Rate and Rhythm: Normal rate and regular rhythm.      Heart sounds: Normal heart sounds. No murmur heard.     No gallop.   Pulmonary:      Effort: No respiratory distress.      Breath sounds: Normal breath sounds. No stridor. No wheezing, rhonchi or rales.   Chest:      Chest wall: No tenderness.   Abdominal:      General: Abdomen is flat. There is no distension.      Palpations: Abdomen is soft. There is no mass.      Tenderness: There is no abdominal tenderness. There is no guarding or rebound.      Comments: Abdomen nondistended but absent bowel sounds   Musculoskeletal:         General: No swelling, tenderness, deformity or signs of injury.      Cervical back: No rigidity or tenderness.      Right lower leg: No edema.      Left lower leg: No edema.   Lymphadenopathy:      Cervical: No cervical adenopathy.   Skin:     Coloration: Skin is not jaundiced.      Findings: No bruising, lesion or rash.   Neurological:      Mental Status: He is alert and oriented to person, place, and time.      Cranial Nerves: No cranial nerve deficit.      Sensory: No sensory deficit.      Motor: No weakness.      Coordination: Coordination normal.      Gait: Gait normal.      Deep Tendon Reflexes: Reflexes normal.   Psychiatric:         Mood  and Affect: Mood normal.         Behavior: Behavior normal.         Thought Content: Thought content normal.         Judgment: Judgment normal.       HENT:      Head: Normocephalic and atraumatic.   Eyes:      Extraocular Movements: Extraocular movements intact.      Conjunctiva/sclera: Conjunctivae normal.      Pupils: Pupils are equal, round, and reactive to light.   Cardiovascular:      Rate and Rhythm: Normal rate and regular rhythm.      Pulses: Normal pulses.      Heart sounds: Normal heart sounds.   Pulmonary:      Effort: Pulmonary effort is normal. No respiratory distress.      Breath sounds: Normal breath sounds.   Abdominal:      General: Abdomen is flat. Bowel sounds are normal. There is no distension.      Palpations: Abdomen is soft. There is no mass.   Musculoskeletal:      Cervical back: Normal range of motion and neck supple.      Right lower leg: No edema.      Left lower leg: No edema.      Comments: Right lower extremity in line upper extremity wrapped with dressing   Skin:     General: Skin is warm and dry.   Neurological:      Mental Status:     Lines/Drains/Airways       Drain  Duration                  Closed/Suction Drain 09/27/23 1345 Tube - 1 Midline Back Accordion 10 Fr. <1 day         NG/OG Tube 09/27/23 1730 nasogastric Left nostril <1 day              Peripheral Intravenous Line  Duration                  Peripheral IV - Single Lumen 09/23/23 18 G Left Antecubital 5 days         Peripheral IV - Single Lumen 09/26/23 2200 Anterior;Left Forearm 1 day         Peripheral IV - Single Lumen 09/27/23 0728 18 G Left Hand 1 day                    Significant Labs:    Lab Results   Component Value Date    WBC 5.44 09/28/2023    HGB 12.6 (L) 09/28/2023    HCT 35.7 (L) 09/28/2023    MCV 86.0 09/28/2023     09/28/2023           BMP  Lab Results   Component Value Date     09/28/2023    K 3.8 09/28/2023    CHLORIDE 96 (L) 09/28/2023    CO2 28 09/28/2023    BUN 23.4 (H) 09/28/2023     CREATININE 1.26 (H) 09/28/2023    CALCIUM 8.5 09/28/2023              Significant Imaging:  I have reviewed the pertinent imaging within the past 24 hours.        Assessment/Plan:     Assessment  Polytrauma right radial fracture, right hip, talus, and calcaneus fracture  L3 burst fracture post lateral fusion L1-L5 on September 27th  Ileus      Plan  Continue NG drainage  Orthopedic Surgery later this week       Pierre Holman MD  Pulmonary Critical Care Medicine  [facility] - [facility] ICU

## 2023-09-28 NOTE — NURSING
Nurses Note -- 4 Eyes      9/28/2023   6:46 AM      Skin assessed during: Daily Assessment      [x] No Altered Skin Integrity Present    [x]Prevention Measures Documented      [] Yes- Altered Skin Integrity Present or Discovered   [] LDA Added if Not in Epic (Describe Wound)   [] New Altered Skin Integrity was Present on Admit and Documented in LDA   [] Wound Image Taken    Wound Care Consulted? No    Attending Nurse:  Collette GRAHAM    Second RN/Staff Member:   Anjelica GRAHAM

## 2023-09-28 NOTE — PROGRESS NOTES
Hospital ICU Progress Note  [facility]      Admit Date: 9/23/2023  Post-operative Day: 1 Day Post-Op  Hospital Day: 6    SUBJECTIVE:     POD #1 s/p L1 to L5 posterolateral fusion with L2-3 laminectomy for a L3 burst fracture    Interval History:  Mr. Henry has moderate low back pain.  He has done well from a neurological standpoint, but had an NG tube placed postoperatively with 3 L of output.      Scheduled Meds:   acetaminophen  650 mg Oral Q4H    calcium-vitamin D3  1 tablet Oral BID    ceFAZolin (ANCEF) IVPB  1 g Intravenous Q8H    docusate sodium  100 mg Oral BID    folic acid  1 mg Oral Daily    gabapentin  300 mg Oral TID    methocarbamoL  1,000 mg Oral TID    multivitamin  1 tablet Oral Daily    mupirocin   Nasal BID    polyethylene glycol  17 g Oral BID    thiamine  100 mg Oral Daily     Continuous Infusions:   dexmedeTOMIDine (Precedex) infusion (titrating) Stopped (09/28/23 0302)    lactated ringers 100 mL/hr at 09/28/23 0441     PRN Meds:bisacodyL, heparin, porcine (PF), LORazepam, melatonin, metoclopramide HCl, midazolam, morphine, ondansetron, oxyCODONE    Review of patient's allergies indicates:  No Known Allergies    Past Medical History:   Diagnosis Date    GERD (gastroesophageal reflux disease)     Smoker      History reviewed. No pertinent surgical history.    OBJECTIVE:     Vital Signs (Most Recent)  Temp: 99 °F (37.2 °C) (09/28/23 0400)  Pulse: 108 (09/28/23 0400)  Resp: 17 (09/28/23 0400)  BP: 98/65 (09/28/23 0400)  SpO2: 96 % (09/28/23 0400)    Vital Signs Range (Last 24H):  Temp:  [98.6 °F (37 °C)-102 °F (38.9 °C)]   Pulse:  [100-128]   Resp:  [13-28]   BP: ()/()   SpO2:  [91 %-100 %]     Drain:  400 cc in the last 24 hrs, 250 cc in last 8 hrs, serosanguineous      Physical Exam:  Awake and conversant  Motor exam is limited by right upper extremity and right lower extremity in splints  Strength in left upper and lower extremities is 5/5 except left  "iliopsoas is 2/5 secondary to pain     On the right upper and lower extremities, he is able to wiggle his fingers and toes  Normal sensation to light touch x 4 extremities  Dressing- C/D/I      Laboratory Results:  CBC without Differential:  Lab Results   Component Value Date    WBC 5.44 09/28/2023    HGB 12.6 (L) 09/28/2023    HCT 35.7 (L) 09/28/2023     09/28/2023    MCV 86.0 09/28/2023     Differential:   No results found for: "NEUTROABS", "LYMPHSABS", "BASOSABS", "MONOSABS"    Basic Metabolic Panel:  Lab Results   Component Value Date     09/28/2023    K 3.8 09/28/2023    BUN 23.4 (H) 09/28/2023     Coagulation Studies:  Lab Results   Component Value Date    INR 1.0 09/27/2023    INR 1.0 09/23/2023    PROTIME 13.2 09/27/2023    PROTIME 13.4 09/23/2023     Lab Results   Component Value Date    PTT 27.5 09/23/2023     Urinalysis:  Lab Results   Component Value Date    LEUKOCYTESUR Negative 09/24/2023    UROBILINOGEN 0.2 09/24/2023          ASSESSMENT/PLAN:     Mr. Henry is a 47 year old healthy male who was sleepwalking and fell from a second-story balcony on 09/23/2023.  He sustained polytrauma including a burst fracture of L3 with retropulsion, right radius fracture,  right hip fracture, and comminuted fracture of the calcaneus with minimal displaced fracture of the talus.  Aside from the limitations in his neurological exam due to his orthopedic injuries, Mr. Henry has a normal motor and sensory neurological exam.      Plan:     1.  Neuro checks have been reduced to Q4 hrs from Q2 hrs.      2.  Continue Hemovac drain.     3.  Mr. Henry encouraged to sit up with physical therapy and occupational therapy.  He is to wear his Aspen TLSO brace whenever his head of bed is greater than 30°.       4   The patient is not yet cleared to ambulate, as he is scheduled to undergo a right total hip replacement tomorrow on Friday, 09/29/2023 by orthopedic surgeon Dr. Vallejo.     5.  Management of NG " tube and GI issues to be addressed by the trauma team.  I discussed with Dr. Lombardo that the patient had an episode of emesis in the OR.     6.  Case management/ social work have been consulted to assist with inpatient rehab placement after Mr. Henry is discharged from the hospital.      7.  Arrangements are being made for the patient to follow up in the neurosurgery clinic in 6 weeks with HEIDI Cruz.  Two days prior to this visit, he needs to complete lumbar spine x-rays with upright AP and lateral views in his Aspen TLSO brace.    8.  Neurosurgery will continue to follow the patient.  Please do not hesitate to contact neurosurgery for any additional questions or concerns.        Kiera Diaz MD  Neurosurgery

## 2023-09-28 NOTE — PT/OT/SLP PROGRESS
Still needs R JESSIE, possibly scheduled for tomorrow. Will follow up as appropriate for PT eval.

## 2023-09-29 ENCOUNTER — ANESTHESIA (OUTPATIENT)
Dept: SURGERY | Facility: HOSPITAL | Age: 48
DRG: 956 | End: 2023-09-29
Payer: MEDICAID

## 2023-09-29 ENCOUNTER — ANESTHESIA EVENT (OUTPATIENT)
Dept: SURGERY | Facility: HOSPITAL | Age: 48
DRG: 956 | End: 2023-09-29
Payer: MEDICAID

## 2023-09-29 LAB
ABS NEUT (OLG): 4.83 X10(3)/MCL (ref 2.1–9.2)
ALBUMIN SERPL-MCNC: 2.9 G/DL (ref 3.5–5)
ALBUMIN/GLOB SERPL: 1.4 RATIO (ref 1.1–2)
ALP SERPL-CCNC: 43 UNIT/L (ref 40–150)
ALT SERPL-CCNC: 22 UNIT/L (ref 0–55)
AST SERPL-CCNC: 44 UNIT/L (ref 5–34)
BILIRUB SERPL-MCNC: 0.4 MG/DL
BUN SERPL-MCNC: 14.4 MG/DL (ref 8.9–20.6)
CALCIUM SERPL-MCNC: 8 MG/DL (ref 8.4–10.2)
CHLORIDE SERPL-SCNC: 103 MMOL/L (ref 98–107)
CO2 SERPL-SCNC: 28 MMOL/L (ref 22–29)
CREAT SERPL-MCNC: 0.74 MG/DL (ref 0.73–1.18)
ERYTHROCYTE [DISTWIDTH] IN BLOOD BY AUTOMATED COUNT: 13.2 % (ref 11.5–17)
GFR SERPLBLD CREATININE-BSD FMLA CKD-EPI: >60 MLS/MIN/1.73/M2
GLOBULIN SER-MCNC: 2.1 GM/DL (ref 2.4–3.5)
GLUCOSE SERPL-MCNC: 103 MG/DL (ref 74–100)
HCT VFR BLD AUTO: 31.7 % (ref 42–52)
HGB BLD-MCNC: 10.8 G/DL (ref 14–18)
INSTRUMENT WBC (OLG): 7.21 X10(3)/MCL
LYMPHOCYTES NFR BLD MANUAL: 0.79 X10(3)/MCL
LYMPHOCYTES NFR BLD MANUAL: 11 %
MCH RBC QN AUTO: 30.1 PG (ref 27–31)
MCHC RBC AUTO-ENTMCNC: 34.1 G/DL (ref 33–36)
MCV RBC AUTO: 88.3 FL (ref 80–94)
METAMYELOCYTES NFR BLD MANUAL: 1 %
MONOCYTES NFR BLD MANUAL: 1.3 X10(3)/MCL (ref 0.1–1.3)
MONOCYTES NFR BLD MANUAL: 18 %
MYELOCYTES NFR BLD MANUAL: 1 %
NEUTROPHILS NFR BLD MANUAL: 67 %
NRBC BLD AUTO-RTO: 0 %
PLATELET # BLD AUTO: 241 X10(3)/MCL (ref 130–400)
PLATELET # BLD EST: ADEQUATE 10*3/UL
PMV BLD AUTO: 9.9 FL (ref 7.4–10.4)
POTASSIUM SERPL-SCNC: 3.7 MMOL/L (ref 3.5–5.1)
PROMYELOCYTES # BLD MANUAL: 2 %
PROT SERPL-MCNC: 5 GM/DL (ref 6.4–8.3)
RBC # BLD AUTO: 3.59 X10(6)/MCL (ref 4.7–6.1)
RBC MORPH BLD: NORMAL
SODIUM SERPL-SCNC: 139 MMOL/L (ref 136–145)
WBC # SPEC AUTO: 7.21 X10(3)/MCL (ref 4.5–11.5)

## 2023-09-29 PROCEDURE — 71000033 HC RECOVERY, INTIAL HOUR: Performed by: ORTHOPAEDIC SURGERY

## 2023-09-29 PROCEDURE — A4216 STERILE WATER/SALINE, 10 ML: HCPCS | Performed by: ORTHOPAEDIC SURGERY

## 2023-09-29 PROCEDURE — D9220A PRA ANESTHESIA: ICD-10-PCS | Mod: ANES,,, | Performed by: ANESTHESIOLOGY

## 2023-09-29 PROCEDURE — 27000221 HC OXYGEN, UP TO 24 HOURS

## 2023-09-29 PROCEDURE — 94799 UNLISTED PULMONARY SVC/PX: CPT

## 2023-09-29 PROCEDURE — 25000003 PHARM REV CODE 250: Performed by: NEUROLOGICAL SURGERY

## 2023-09-29 PROCEDURE — 94761 N-INVAS EAR/PLS OXIMETRY MLT: CPT

## 2023-09-29 PROCEDURE — 25000003 PHARM REV CODE 250: Performed by: NURSE ANESTHETIST, CERTIFIED REGISTERED

## 2023-09-29 PROCEDURE — C1776 JOINT DEVICE (IMPLANTABLE): HCPCS | Performed by: ORTHOPAEDIC SURGERY

## 2023-09-29 PROCEDURE — 63600175 PHARM REV CODE 636 W HCPCS: Performed by: ANESTHESIOLOGY

## 2023-09-29 PROCEDURE — 27201423 OPTIME MED/SURG SUP & DEVICES STERILE SUPPLY: Performed by: ORTHOPAEDIC SURGERY

## 2023-09-29 PROCEDURE — 37000008 HC ANESTHESIA 1ST 15 MINUTES: Performed by: ORTHOPAEDIC SURGERY

## 2023-09-29 PROCEDURE — 36000711: Performed by: ORTHOPAEDIC SURGERY

## 2023-09-29 PROCEDURE — 85027 COMPLETE CBC AUTOMATED: CPT | Performed by: NURSE PRACTITIONER

## 2023-09-29 PROCEDURE — 25000003 PHARM REV CODE 250: Performed by: ORTHOPAEDIC SURGERY

## 2023-09-29 PROCEDURE — 27130 PR TOTAL HIP ARTHROPLASTY: ICD-10-PCS | Mod: AS,RT,, | Performed by: NURSE PRACTITIONER

## 2023-09-29 PROCEDURE — 99024 PR POST-OP FOLLOW-UP VISIT: ICD-10-PCS | Mod: ,,, | Performed by: NEUROLOGICAL SURGERY

## 2023-09-29 PROCEDURE — 99024 POSTOP FOLLOW-UP VISIT: CPT | Mod: ,,, | Performed by: NEUROLOGICAL SURGERY

## 2023-09-29 PROCEDURE — 20000000 HC ICU ROOM

## 2023-09-29 PROCEDURE — 99900035 HC TECH TIME PER 15 MIN (STAT)

## 2023-09-29 PROCEDURE — D9220A PRA ANESTHESIA: Mod: CRNA,,, | Performed by: NURSE ANESTHETIST, CERTIFIED REGISTERED

## 2023-09-29 PROCEDURE — 63600175 PHARM REV CODE 636 W HCPCS: Performed by: NURSE PRACTITIONER

## 2023-09-29 PROCEDURE — 25000003 PHARM REV CODE 250: Performed by: NURSE PRACTITIONER

## 2023-09-29 PROCEDURE — D9220A PRA ANESTHESIA: ICD-10-PCS | Mod: CRNA,,, | Performed by: NURSE ANESTHETIST, CERTIFIED REGISTERED

## 2023-09-29 PROCEDURE — D9220A PRA ANESTHESIA: Mod: ANES,,, | Performed by: ANESTHESIOLOGY

## 2023-09-29 PROCEDURE — 80053 COMPREHEN METABOLIC PANEL: CPT | Performed by: NURSE PRACTITIONER

## 2023-09-29 PROCEDURE — 27130 TOTAL HIP ARTHROPLASTY: CPT | Mod: AS,RT,, | Performed by: NURSE PRACTITIONER

## 2023-09-29 PROCEDURE — 63600175 PHARM REV CODE 636 W HCPCS

## 2023-09-29 PROCEDURE — 27130 TOTAL HIP ARTHROPLASTY: CPT | Mod: RT,,, | Performed by: ORTHOPAEDIC SURGERY

## 2023-09-29 PROCEDURE — 27130 PR TOTAL HIP ARTHROPLASTY: ICD-10-PCS | Mod: RT,,, | Performed by: ORTHOPAEDIC SURGERY

## 2023-09-29 PROCEDURE — 63600175 PHARM REV CODE 636 W HCPCS: Performed by: SURGERY

## 2023-09-29 PROCEDURE — 63600175 PHARM REV CODE 636 W HCPCS: Performed by: ORTHOPAEDIC SURGERY

## 2023-09-29 PROCEDURE — 37000009 HC ANESTHESIA EA ADD 15 MINS: Performed by: ORTHOPAEDIC SURGERY

## 2023-09-29 PROCEDURE — 63600175 PHARM REV CODE 636 W HCPCS: Performed by: NEUROLOGICAL SURGERY

## 2023-09-29 PROCEDURE — 63600175 PHARM REV CODE 636 W HCPCS: Performed by: NURSE ANESTHETIST, CERTIFIED REGISTERED

## 2023-09-29 PROCEDURE — 36000710: Performed by: ORTHOPAEDIC SURGERY

## 2023-09-29 DEVICE — CERAMIC V40 FEMORAL HEAD
Type: IMPLANTABLE DEVICE | Site: HIP | Status: FUNCTIONAL
Brand: BIOLOX

## 2023-09-29 DEVICE — 127 DEGREE NECK ANGLE HIP STEM
Type: IMPLANTABLE DEVICE | Site: HIP | Status: FUNCTIONAL
Brand: ACCOLADE

## 2023-09-29 DEVICE — TRIDENT X3 0 DEGREE POLYETHYLENE INSERT
Type: IMPLANTABLE DEVICE | Site: HIP | Status: FUNCTIONAL
Brand: TRIDENT X3 INSERT

## 2023-09-29 DEVICE — TRIDENT II TRITANIUM CLUSTER 56F
Type: IMPLANTABLE DEVICE | Site: HIP | Status: FUNCTIONAL
Brand: TRIDENT II

## 2023-09-29 RX ORDER — MORPHINE SULFATE 10 MG/ML
INJECTION INTRAMUSCULAR; INTRAVENOUS; SUBCUTANEOUS
Status: DISCONTINUED | OUTPATIENT
Start: 2023-09-29 | End: 2023-09-29 | Stop reason: HOSPADM

## 2023-09-29 RX ORDER — ONDANSETRON HYDROCHLORIDE 2 MG/ML
INJECTION, SOLUTION INTRAMUSCULAR; INTRAVENOUS
Status: DISCONTINUED | OUTPATIENT
Start: 2023-09-29 | End: 2023-09-29

## 2023-09-29 RX ORDER — TRANEXAMIC ACID 100 MG/ML
INJECTION, SOLUTION INTRAVENOUS
Status: DISCONTINUED | OUTPATIENT
Start: 2023-09-29 | End: 2023-09-29

## 2023-09-29 RX ORDER — MEPERIDINE HYDROCHLORIDE 25 MG/ML
12.5 INJECTION INTRAMUSCULAR; INTRAVENOUS; SUBCUTANEOUS ONCE
Status: DISCONTINUED | OUTPATIENT
Start: 2023-09-29 | End: 2023-09-29 | Stop reason: HOSPADM

## 2023-09-29 RX ORDER — ACETAMINOPHEN 10 MG/ML
INJECTION, SOLUTION INTRAVENOUS
Status: DISCONTINUED | OUTPATIENT
Start: 2023-09-29 | End: 2023-09-29

## 2023-09-29 RX ORDER — MIDAZOLAM HYDROCHLORIDE 1 MG/ML
INJECTION INTRAMUSCULAR; INTRAVENOUS
Status: DISCONTINUED | OUTPATIENT
Start: 2023-09-29 | End: 2023-09-29

## 2023-09-29 RX ORDER — ONDANSETRON 2 MG/ML
4 INJECTION INTRAMUSCULAR; INTRAVENOUS ONCE
Status: DISCONTINUED | OUTPATIENT
Start: 2023-09-29 | End: 2023-09-29 | Stop reason: HOSPADM

## 2023-09-29 RX ORDER — HYDROMORPHONE HYDROCHLORIDE 2 MG/ML
0.5 INJECTION, SOLUTION INTRAMUSCULAR; INTRAVENOUS; SUBCUTANEOUS EVERY 5 MIN PRN
Status: DISCONTINUED | OUTPATIENT
Start: 2023-09-29 | End: 2023-09-29 | Stop reason: HOSPADM

## 2023-09-29 RX ORDER — DIPHENHYDRAMINE HYDROCHLORIDE 50 MG/ML
25 INJECTION INTRAMUSCULAR; INTRAVENOUS EVERY 6 HOURS PRN
Status: DISCONTINUED | OUTPATIENT
Start: 2023-09-29 | End: 2023-09-29 | Stop reason: HOSPADM

## 2023-09-29 RX ORDER — DEXAMETHASONE SODIUM PHOSPHATE 4 MG/ML
INJECTION, SOLUTION INTRA-ARTICULAR; INTRALESIONAL; INTRAMUSCULAR; INTRAVENOUS; SOFT TISSUE
Status: DISCONTINUED | OUTPATIENT
Start: 2023-09-29 | End: 2023-09-29

## 2023-09-29 RX ORDER — ROCURONIUM BROMIDE 10 MG/ML
INJECTION, SOLUTION INTRAVENOUS
Status: DISCONTINUED | OUTPATIENT
Start: 2023-09-29 | End: 2023-09-29

## 2023-09-29 RX ORDER — KETOROLAC TROMETHAMINE 30 MG/ML
INJECTION, SOLUTION INTRAMUSCULAR; INTRAVENOUS
Status: DISCONTINUED | OUTPATIENT
Start: 2023-09-29 | End: 2023-09-29 | Stop reason: HOSPADM

## 2023-09-29 RX ORDER — SODIUM CHLORIDE 0.9 % (FLUSH) 0.9 %
SYRINGE (ML) INJECTION
Status: DISCONTINUED | OUTPATIENT
Start: 2023-09-29 | End: 2023-09-29 | Stop reason: HOSPADM

## 2023-09-29 RX ORDER — LABETALOL HYDROCHLORIDE 5 MG/ML
INJECTION, SOLUTION INTRAVENOUS
Status: COMPLETED
Start: 2023-09-29 | End: 2023-09-29

## 2023-09-29 RX ORDER — PROPOFOL 10 MG/ML
VIAL (ML) INTRAVENOUS
Status: DISCONTINUED | OUTPATIENT
Start: 2023-09-29 | End: 2023-09-29

## 2023-09-29 RX ORDER — FENTANYL CITRATE 50 UG/ML
INJECTION, SOLUTION INTRAMUSCULAR; INTRAVENOUS
Status: DISCONTINUED | OUTPATIENT
Start: 2023-09-29 | End: 2023-09-29

## 2023-09-29 RX ORDER — CEFAZOLIN SODIUM 2 G/50ML
2 SOLUTION INTRAVENOUS
Status: DISCONTINUED | OUTPATIENT
Start: 2023-09-29 | End: 2023-09-29 | Stop reason: HOSPADM

## 2023-09-29 RX ORDER — LIDOCAINE HYDROCHLORIDE 20 MG/ML
INJECTION, SOLUTION EPIDURAL; INFILTRATION; INTRACAUDAL; PERINEURAL
Status: DISCONTINUED | OUTPATIENT
Start: 2023-09-29 | End: 2023-09-29

## 2023-09-29 RX ORDER — EPINEPHRINE 1 MG/ML
INJECTION, SOLUTION, CONCENTRATE INTRAVENOUS
Status: DISCONTINUED | OUTPATIENT
Start: 2023-09-29 | End: 2023-09-29 | Stop reason: HOSPADM

## 2023-09-29 RX ORDER — SUCCINYLCHOLINE CHLORIDE 20 MG/ML
INJECTION INTRAMUSCULAR; INTRAVENOUS
Status: DISCONTINUED | OUTPATIENT
Start: 2023-09-29 | End: 2023-09-29

## 2023-09-29 RX ORDER — LABETALOL HYDROCHLORIDE 5 MG/ML
10 INJECTION, SOLUTION INTRAVENOUS ONCE
Status: COMPLETED | OUTPATIENT
Start: 2023-09-29 | End: 2023-09-29

## 2023-09-29 RX ORDER — ROPIVACAINE HYDROCHLORIDE 5 MG/ML
INJECTION, SOLUTION EPIDURAL; INFILTRATION; PERINEURAL
Status: DISCONTINUED | OUTPATIENT
Start: 2023-09-29 | End: 2023-09-29 | Stop reason: HOSPADM

## 2023-09-29 RX ORDER — HYDROMORPHONE HYDROCHLORIDE 2 MG/ML
0.5 INJECTION, SOLUTION INTRAMUSCULAR; INTRAVENOUS; SUBCUTANEOUS EVERY 10 MIN PRN
Status: DISCONTINUED | OUTPATIENT
Start: 2023-09-29 | End: 2023-09-29 | Stop reason: HOSPADM

## 2023-09-29 RX ORDER — HYDROMORPHONE HYDROCHLORIDE 2 MG/ML
0.2 INJECTION, SOLUTION INTRAMUSCULAR; INTRAVENOUS; SUBCUTANEOUS EVERY 5 MIN PRN
Status: DISCONTINUED | OUTPATIENT
Start: 2023-09-29 | End: 2023-09-29 | Stop reason: HOSPADM

## 2023-09-29 RX ADMIN — METHOCARBAMOL 500 MG: 100 INJECTION INTRAMUSCULAR; INTRAVENOUS at 09:09

## 2023-09-29 RX ADMIN — LABETALOL HYDROCHLORIDE 5 MG: 5 INJECTION, SOLUTION INTRAVENOUS at 06:09

## 2023-09-29 RX ADMIN — ROCURONIUM BROMIDE 10 MG: 10 SOLUTION INTRAVENOUS at 04:09

## 2023-09-29 RX ADMIN — PROPOFOL 150 MG: 10 INJECTION, EMULSION INTRAVENOUS at 04:09

## 2023-09-29 RX ADMIN — HYDROMORPHONE HYDROCHLORIDE 0.5 MG: 2 INJECTION INTRAMUSCULAR; INTRAVENOUS; SUBCUTANEOUS at 06:09

## 2023-09-29 RX ADMIN — SUCCINYLCHOLINE CHLORIDE 200 MG: 20 INJECTION, SOLUTION INTRAMUSCULAR; INTRAVENOUS at 04:09

## 2023-09-29 RX ADMIN — FAMOTIDINE 20 MG: 10 INJECTION, SOLUTION INTRAVENOUS at 09:09

## 2023-09-29 RX ADMIN — SODIUM CHLORIDE, SODIUM GLUCONATE, SODIUM ACETATE, POTASSIUM CHLORIDE AND MAGNESIUM CHLORIDE: 526; 502; 368; 37; 30 INJECTION, SOLUTION INTRAVENOUS at 04:09

## 2023-09-29 RX ADMIN — METHOCARBAMOL 500 MG: 100 INJECTION INTRAMUSCULAR; INTRAVENOUS at 05:09

## 2023-09-29 RX ADMIN — TRANEXAMIC ACID 1000 MG: 100 INJECTION, SOLUTION INTRAVENOUS at 04:09

## 2023-09-29 RX ADMIN — FENTANYL CITRATE 50 MCG: 50 INJECTION, SOLUTION INTRAMUSCULAR; INTRAVENOUS at 04:09

## 2023-09-29 RX ADMIN — DEXTROSE MONOHYDRATE 1 G: 5 INJECTION INTRAVENOUS at 09:09

## 2023-09-29 RX ADMIN — FENTANYL CITRATE 50 MCG: 50 INJECTION, SOLUTION INTRAMUSCULAR; INTRAVENOUS at 05:09

## 2023-09-29 RX ADMIN — LIDOCAINE HYDROCHLORIDE 40 MG: 20 INJECTION, SOLUTION EPIDURAL; INFILTRATION; INTRACAUDAL; PERINEURAL at 04:09

## 2023-09-29 RX ADMIN — MIDAZOLAM HYDROCHLORIDE 2 MG: 1 INJECTION, SOLUTION INTRAMUSCULAR; INTRAVENOUS at 04:09

## 2023-09-29 RX ADMIN — ONDANSETRON 4 MG: 2 INJECTION INTRAMUSCULAR; INTRAVENOUS at 04:09

## 2023-09-29 RX ADMIN — HYDROMORPHONE HYDROCHLORIDE 1 MG: 2 INJECTION INTRAMUSCULAR; INTRAVENOUS; SUBCUTANEOUS at 02:09

## 2023-09-29 RX ADMIN — DEXAMETHASONE SODIUM PHOSPHATE 4 MG: 4 INJECTION, SOLUTION INTRA-ARTICULAR; INTRALESIONAL; INTRAMUSCULAR; INTRAVENOUS; SOFT TISSUE at 04:09

## 2023-09-29 RX ADMIN — SUGAMMADEX 200 MG: 100 INJECTION, SOLUTION INTRAVENOUS at 05:09

## 2023-09-29 RX ADMIN — SODIUM CHLORIDE, POTASSIUM CHLORIDE, SODIUM LACTATE AND CALCIUM CHLORIDE 1000 ML: 600; 310; 30; 20 INJECTION, SOLUTION INTRAVENOUS at 09:09

## 2023-09-29 RX ADMIN — ROCURONIUM BROMIDE 30 MG: 10 SOLUTION INTRAVENOUS at 04:09

## 2023-09-29 RX ADMIN — DEXTROSE MONOHYDRATE 1 G: 5 INJECTION INTRAVENOUS at 12:09

## 2023-09-29 RX ADMIN — DEXTROSE MONOHYDRATE 2 G: 50 INJECTION, SOLUTION INTRAVENOUS at 04:09

## 2023-09-29 RX ADMIN — SODIUM CHLORIDE, POTASSIUM CHLORIDE, SODIUM LACTATE AND CALCIUM CHLORIDE: 600; 310; 30; 20 INJECTION, SOLUTION INTRAVENOUS at 09:09

## 2023-09-29 RX ADMIN — ACETAMINOPHEN 1000 MG: 10 INJECTION, SOLUTION INTRAVENOUS at 05:09

## 2023-09-29 NOTE — PROGRESS NOTES
TRAUMA ICU PROGRESS NOTE    HD# 6  Admission Summary:   In brief, José Henry is a 47 y.o. male admitted on 9/23/2023 following  fell or slept wall fall from a balcony of approximally 12th and 13th feet.  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  GCS 15.  Family at bedside.  Denies any past medical history.  Pain is moderately controlled.  He has a sugar-tong splint to the right upper extremity and a short-leg splint to the right lower extremity.  His vital signs are stable.  Orthopedics and neurosurgery aware of the patient.  Both believe these may be operative  injuries pending further evaluation.  He was MRIs that are pending as well.     Interval history:     AF. Feeling better, flatus yesterday. NGT output improving, 1450 / 24 hours. To OR with Ortho today.     Consults:   Neurosurgery  Orthopedic surgery Injuries:  Right talus fx  Right radius fx  [id number] body fx  Pelvic fx    [x]Problems list reviewed Operations/Procedures:   9/27 PLIF     Past medical history:  ETOH use, denies other     Medications: [] Medications reviewed/updated   Home Meds:    Current Outpatient Medications   Medication Instructions    dextroamphetamine-amphetamine 30 mg Tab 1 tablet, Oral, Daily    pantoprazole (PROTONIX) 40 MG tablet 1 tablet, Oral, Daily      Scheduled Meds:    bisacodyL  10 mg Rectal Once    calcium-vitamin D3  1 tablet Oral BID    ceFAZolin (ANCEF) IVPB  1 g Intravenous Q8H    famotidine (PF)  20 mg Intravenous BID    folic acid  1 mg Oral Daily    gabapentin  300 mg Oral TID    lactated ringers  1,000 mL Intravenous Once    methocarbamoL  500 mg Intravenous Q8H    multivitamin  1 tablet Oral Daily    thiamine  100 mg Oral  "Daily     Continuous Infusions:    dexmedeTOMIDine (Precedex) infusion (titrating) Stopped (23 0714)    lactated ringers 125 mL/hr at 23 0910       PRN Meds: bisacodyL, heparin, porcine (PF), HYDROmorphone, LORazepam, melatonin, metoclopramide HCl, midazolam, ondansetron, oxyCODONE     Vitals:  VITAL SIGNS: 24 HR MIN & MAX LAST   Temp  Min: 98.5 °F (36.9 °C)  Max: 99 °F (37.2 °C)  99 °F (37.2 °C)   BP  Min: 116/79  Max: 160/92  127/87    Pulse  Min: 91  Max: 110  98    Resp  Min: 14  Max: 28  (!) 21    SpO2  Min: 93 %  Max: 99 %  95 %      HT: 6' 2" (188 cm)  WT: 93.9 kg (207 lb)  BMI: 26.6   Ideal Body Weight (IBW), Male: 190 lb  % Ideal Body Weight, Male (lb): 108.95 %        General  Exam: NAD     Neuro/Psych  GCS: 15 (E 4) (V 5) (M 6)  Exam: cn2 -12 intact/ all extremities sensation intact, no tremors   ICP monitor: No  ICP treatment: ICP Treatment: N/A  C-Collar: No    Plan:   POD 2 PLIF with hemovac in place   TLSO when HOB elevated  Q4h Neurochecks   CIWA with versed, DC precedex   Pain control PRN   NSGY following      HEENT  Exam: perrl    Plan:   monitor     Pulmonary  Vitals: Resp  Av.7  Min: 14  Max: 28  SpO2  Av.9 %  Min: 93 %  Max: 99 %    Ventilator/Oxygen Settings:            PaO2/FiO2 ratio (if ventilated):   RSBI RR/TV (if ventilated):      ABG:   No results for input(s): "PH", "PO2", "PCO2", "HCO3", "BE" in the last 168 hours.     CXR:    No results found in the last 24 hours.      Rib fractures: none  Chest Tube: None     Exam: bilateral breath sounds with no increased WOB     Plan:     CXR nonacute   On RA  IS       Incentive Spirometry/RT Treatments: 750-1000     Cardiovascular  Vitals: Pulse  Av  Min: 91  Max: 110  BP  Min: 116/79  Max: 160/92  No results for input(s): "TROPONINI", "CKTOTAL", "CKMB", "BNP" in the last 168 hours.  Vasoactive Agents: None  Exam: +S1/S2 sinus tach    Plan:   Continuous tele, Monitor BP/HR   HR improving      Renal  Recent Labs     " "23  0232 23  18023  0039 23  0200   BUN 17.2 25.5* 23.4* 14.4   CREATININE 0.86 1.39* 1.26* 0.74         No results for input(s): "LACTIC" in the last 72 hours.      Intake/Output - Last 3 Shifts          07 0659  07 0659  07 0659    P.O.       I.V. (mL/kg) 2431.1 (25.9) 2144.3 (22.8)     IV Piggyback 2137.3 2402.9     Total Intake(mL/kg) 4568.3 (48.7) 4547.2 (48.4)     Urine (mL/kg/hr) 1425 (0.6) 1850 (0.8)     Emesis/NG output       Drains 4130 1890     Stool       Blood 250      Total Output 5805 3740     Net -1236.7 +807.2                     Intake/Output Summary (Last 24 hours) at 2023 0949  Last data filed at 2023 0600  Gross per 24 hour   Intake 4540.72 ml   Output 3240 ml   Net 1300.72 ml           Harrison: No     Plan:   mIVF with boluses per NGT output   Monitor BUN/Cr/UOP- SCR improved     FEN/GI  Recent Labs     23  0039 23  0200    138 137 139   K 4.2 4.0 3.8 3.7   CO2  28   CALCIUM 9.6 8.7 8.5 8.0*   ALBUMIN 3.7 3.9 3.3* 2.9*   BILITOT 0.8 0.6 0.6 0.4   AST 21 48* 56* 44*   ALKPHOS 70 50 46 43   ALT 21 25 26 22         Diet:  NPO    Last BM:     Abdominal Exam: soft, moderately distended , nontender     Plan:   KUB with ileus vs distal obstruction. +flatus  NGT to LiWS until output decreases and further bowel activity , eval post op   NPO    Trend and replace electrolytes as needed      Heme/Onc  Recent Labs     2328/23  0039 23  0201   HGB 16.6 13.8* 12.6* 10.8*   HCT 48.0 39.1* 35.7* 31.7*    233 248 241   INR  --  1.0  --   --          Transfusions (over past 24h): None    Plan:   H/h stable monitor daily     ID  Temp  Av.8 °F (37.1 °C)  Min: 98.5 °F (36.9 °C)  Max: 99 °F (37.2 °C)      Recent Labs     23  0039 23  0201   WBC 7.39 5.44 7.21  7.21         Cultures: Antibiotics:     1. Ancef " "9/27      Plan:   Trend WBC/ monitor fever trend   Ancef while HV is in      Endocrine  Recent Labs     09/27/23  0232 09/27/23  1806 09/28/23  0039 09/29/23  0200   GLUCOSE 154* 151* 158* 103*        No results for input(s): "POCTGLUCOSE" in the last 72 hours.     Plan:   Monitor glucose     Insulin treatment: no insulin     Musculoskeletal/Wounds  Weight bearing status:   RUE: NWB  LUE: WBAT  RLE: NWB  LLE: WBAT    [] Tertiary exam performed    Extremity/wound exam: VICKERS but with pain on ROM.   Plan:   Ortho following, will need operative care of  Rt hip and R wrist, OR today  PT/OT held pending fixation      Precautions  Fall and Spinal     Prophylaxis  Seizure: Not indicated.  DVT: Defer chemoprophylaxis to Neurosurgery , holding due to drain  GI: H2B     Lines/drains/airway [] LDA reviewed/updated   Lines/Drains/Airways       Drain  Duration                  Closed/Suction Drain 09/27/23 1345 Tube - 1 Midline Back Accordion 10 Fr. 1 day         NG/OG Tube 09/27/23 1730 nasogastric Left nostril 1 day              Peripheral Intravenous Line  Duration                  Peripheral IV - Single Lumen 09/26/23 2200 Anterior;Left Forearm 2 days         Peripheral IV - Single Lumen 09/27/23 0728 18 G Left Hand 2 days                    Plan:  Harrison is out. Maintain other LDAs with routine care      Restraints  Face to face evaluation of need for restraints on rounds today:   Currently restrained? No.        Disposition  Unchanged. Eval post op for downgrade from ICU status and removal of NGT. PT/OT post op.           Amelia Vergara [facility]   Trauma/Acute Care Surgery  [facility]  C: [phone]   "

## 2023-09-29 NOTE — OP NOTE
[facility] - Orthopaedics  Total Hip Arthroplasty   Procedure Note    SUMMARY     Date of Procedure: 9/29/23     Procedure:   Procedure(s) (LRB):  ARTHROPLASTY, HIP (Right)     Surgeon(s) and Role:     * Tai Vallejo MD - Primary    Assisting Surgeon: Bruna Villar NP, ANITHA.  Certified first assist was necessary as a skilled set of hands and was necessary for proper patient positioning as well as assistance with closure in place with multiple implants.      Indications: right midcervical femoral neck fracture.    Pre-Operative Diagnosis: right femoral neck fracture  R calcaneus fracture  Right distal radius fracture    Post-Operative Diagnosis: as above    Anesthesia: Choice    Implants:   Implant Name Type Inv. Item Serial No.  Lot No. LRB No. Used Action   SHELL TRIDENT II ACET F 56MM - [id number]  SHELL TRIDENT II ACET F 56MM  PELONOctonius GRAZYNA. [id number] Right 1 Implanted   INSERT TRIDENT X3 0 DEG 36MM F - [id number]  INSERT TRIDENT X3 0 DEG 36MM F  PELONOctonius GRAZYNA. [id number] Right 1 Implanted   STEM FEMORAL SZ 8 [id number] - [id number]  STEM FEMORAL SZ 8 [id number]  PELON Triggit GRAZYNA. [id number] Right 1 Implanted   HEAD FEM V40 36MM +0MM BIO - [id number]  HEAD FEM V40 36MM +0MM BIO  PELON Triggit GRAZYNA. [id number] Right 1 Implanted       Technical Procedures Used: R JESSIE  Non-op right calcaneus fracture  Non-op right extra-articular - 2 part distal radius fracture    Description of the Findings of the Procedure:     The patient was seen in the Holding Room. The risks, benefits, complications, treatment options, and expected outcomes were discussed with the patient. The risks and potential complications of their problem and purposed treatment include but are not limited to infection, nerve injury, vascular injury, persistent pain, potential skin necrosis, deep vein thrombosis, possible pulmonary embolus, complications of the anesthetics and failure of the implant.  The patient  concurred with the proposed plan, giving informed consent.  The site of surgery properly noted/marked.     The patient was brought to the operating room, placed on the operating table in a supine position. Following the successful induction of anesthesia the patient was placed in the right lateral decubitus position. The right hip was scrubbed, prepped and draped in the usual sterile fashion. Time out was performed verifying correct patient, site, side, and procedure.  All were in agreement.      A standard direct superior style hip incision was carried down to the deep fascia. The short rotators were elevated with the capsule in a single sleeve and the hip was totally dislocated. Femoral neck fracture was identified.          The femoral neck was osteotimized 1-2 cm above the trochanter in an oblique fashion.  Femoral head was removed from the acetabulum and sized.  Retractors placed around the acetabulum.  Labrum excised as well as pulvinar.  We then reamed sequentially up to a 56 mm reamer. No cystic changes were noted within the cleaned acetabular subchondral bone bed. A 56 mm cup was then introduced in 40 degrees of abduction and 20 degrees of anteversion. This was firmly impacted. 36F liner impaced into position and found to be down.  Attention was then turned to the femur. The femur was opened with an awl, and then broached distally to a size 8  stem. Broach came down to a firm and solid stop with a change in pitch. Trial femoral neck and head placed.  Hip taken through full range of motion and found to be stable anteriorly and posteriorly with good recreation of leg length.         With this stability, the trial components were removed. 8 127 deg femoral stem impacted into position followed by 36+0 Biolox femoral head.  The hip was taken through a range of motion and found to be stable. The wound was copiously irrigated, followed by betadine lavage and more normal saline.  Short external rotators and joint  capsule closed through drill tunnels.  Fascia closed with #1 stratifix.  Subcutaneous tissue closed in layers. The skin was reapposed with staples. A compression dressing was applied.    Instrument, sponge, and needle counts were correct prior to wound closure and at the conclusion of the case.     Complications: None; patient tolerated the procedure well.    Estimated Blood Loss (EBL):  65ncc           Condition:stable    Disposition: ICU - extubated and stable.

## 2023-09-29 NOTE — ANESTHESIA PROCEDURE NOTES
Intubation    Date/Time: 9/29/2023 4:12 PM    Performed by: Kavon Alfaro CRNA  Authorized by: Dmitri Mcnally MD    Intubation:     Induction:  Intravenous    Intubated:  Postinduction    Mask Ventilation:  Easy mask    Attempts:  1    Attempted By:  CRNA    Method of Intubation:  Direct    Blade:  Garcia 3    Laryngeal View Grade: Grade I - full view of cords      Difficult Airway Encountered?: No      Complications:  None    Airway Device:  Oral endotracheal tube    Airway Device Size:  8.0    Inflation Amount (mL):  7    Tube secured:  24    Secured at:  The lips    Placement Verified By:  Capnometry    Complicating Factors:  None    Findings Post-Intubation:  BS equal bilateral

## 2023-09-29 NOTE — NURSING
Nurses Note -- 4 Eyes      9/29/2023   5:26 AM      Skin assessed during: Daily Assessment      [x] No Altered Skin Integrity Present    [x]Prevention Measures Documented      [] Yes- Altered Skin Integrity Present or Discovered   [] LDA Added if Not in Epic (Describe Wound)   [] New Altered Skin Integrity was Present on Admit and Documented in LDA   [] Wound Image Taken    Wound Care Consulted? No    Attending Nurse:  April Fong RN/Staff Member:  Myriam Underwood

## 2023-09-29 NOTE — PROGRESS NOTES
POD#2 s/p L1 to L5 posterolateral fusion with L2-3 laminectomy for a L3 burst fracture  He is lying flat in bed, NAD  His post op pain is much improved from previous exam  He continues with NG tube, output decreasing  He is passing gas and denies abdominal pain  He denies numbness and tingling in bilateral LE    AFVSS  PERRL, EOMI  Awake and conversant  Motor exam is limited by right upper extremity and right lower extremity in splints  Strength in left upper and lower extremities is 5/5 except left iliopsoas is 3/5 secondary to pain     On the right upper and lower extremities, he is able to wiggle his fingers and toes  Normal sensation to light touch x 4 extremities  Dressing- C/D/I  Drain output 380/60, serosanguineous    Plan: Continue drain  Continue current dressing  LSO when HOB> 30 degrees  PT/OT when appropriate  For right JESSIE today  SCDs for DVT prophylaxis  Rehab placement pending

## 2023-09-29 NOTE — PROGRESS NOTES
Inpatient Nutrition Assessment    Admit Date: 9/23/2023   Total duration of encounter: 6 days     Nutrition Recommendation/Prescription     Advance diet as tolerated to regular diet  Continue vitamin supplementation   RD to order Boost Breeze TID upon diet advancement to provide an additional 250 kcal and 9 g protein per container  RD to monitor diet advancement, po intake and weight     Communication of Recommendations: reviewed with patient    Nutrition Assessment     Malnutrition Assessment/Nutrition-Focused Physical Exam    Malnutrition Context: acute illness or injury (09/29/23 1508)  Malnutrition Level:  (does not meet criteria) (09/29/23 1508)  Energy Intake (Malnutrition): less than or equal to 50% for greater than or equal to 5 days (09/29/23 1508)  Weight Loss (Malnutrition):  (does not meet criteria) (09/29/23 1508)  Subcutaneous Fat (Malnutrition):  (does not meet criteria) (09/29/23 1508)           Muscle Mass (Malnutrition):  (does not meet criteria) (09/29/23 1508)                                   A minimum of two characteristics is recommended for diagnosis of either severe or non-severe malnutrition.    Chart Review    Reason Seen: length of stay    Malnutrition Screening Tool Results   Have you recently lost weight without trying?: No  Have you been eating poorly because of a decreased appetite?: No   MST Score: 0   Diagnosis:  POD#2 s/p L1 to L5 posterolateral fusion with L2-3 laminectomy for a L3 burst fracture  Ileus vs distal obstruction  Slept walk off balcony resulting in R hip fx, L3 burst fx, central cord stenosis, calcaneus fx, displaced fx of talus    Relevant Medical History: ETOH use, smoker, GERD    Nutrition-Related Medications: lactated ringers, calcium-vitamin D3, Pepcid, folic acid, MCI, thiamine, suppository prn, zofran prn  Calorie Containing IV Medications: no significant kcals from medications at this time    Nutrition-Related Labs: 9/29: RBC-3.59, H/H-10.8/31.7, glu-103,  "sherif-8.0      Diet/PN Order: Diet NPO  Oral Supplement Order: none  Tube Feeding Order: none  Appetite/Oral Intake: NPO/NPO  Factors Affecting Nutritional Intake: NPO  Food/Hoahaoism/Cultural Preferences: none reported  Food Allergies: none reported    Wound(s):       Last Bowel Movement: 23    Comments    : pt NPO x 2 days, NGT to suction for ileus. Pt to OR today for hip ORIF. Pt reports poor appetite since admission, with good appetite prior. No Gi issues reported.  lb with no unintentional weight loss prior admission.    Anthropometrics    Height: 6' 2.02" (188 cm) Height Method: Stated  Last Weight: 93.9 kg (207 lb) (23 1056) Weight Method: Bed Scale  BMI (Calculated): 26.6  BMI Classification: overweight (BMI 25-29.9)        Ideal Body Weight (IBW), Male: 190.12 lb     % Ideal Body Weight, Male (lb): 108.95 %                 Usual Body Weight (UBW), k.9 kg  % Usual Body Weight: 100.2     Usual Weight Provided By: patient and patient denies unintentional weight loss    Wt Readings from Last 5 Encounters:   23 93.9 kg (207 lb)   20 104.1 kg (229 lb 8 oz)     Weight Change(s) Since Admission:  Admit Weight: 93.9 kg (207 lb) (23 1056)      Estimated Needs    Weight Used For Calorie Calculations: 93.9 kg (207 lb 0.2 oz)  Energy Calorie Requirements (kcal): 8086-1455 kcal (1.1-1.2 stress factor  Energy Need Method: [facility]  Weight Used For Protein Calculations: 93.9 kg (207 lb 0.2 oz)  Protein Requirements:  g (1.0-1.2 g/kg)  Fluid Requirements (mL): 5343-2800 mL (1 mL/kcal)  Temp (24hrs), Av.8 °F (37.1 °C), Min:98.5 °F (36.9 °C), Max:99 °F (37.2 °C)       Enteral Nutrition    Patient not receiving enteral nutrition at this time.    Parenteral Nutrition    Patient not receiving parenteral nutrition support at this time.    Evaluation of Received Nutrient Intake    Calories: not meeting estimated needs  Protein: not meeting estimated needs    Patient " Education    Not applicable.    Nutrition Diagnosis     PES: Inadequate energy intake related to inability to consume sufficient nutrients as evidenced by NPO x 2 days. (new)    Interventions/Goals     Intervention(s): general/healthful diet, commercial beverage, multivitamin/mineral supplement therapy, and collaboration with other providers  Goal: Meet greater than 75% of nutritional needs by follow-up. (new)    Monitoring & Evaluation     Dietitian will monitor energy intake, weight, electrolyte/renal panel, and glucose/endocrine profile.  Nutrition Risk/Follow-Up: high (follow-up in 1-4 days)   Please consult if re-assessment needed sooner.

## 2023-09-29 NOTE — ANESTHESIA PREPROCEDURE EVALUATION
09/29/2023  José Henry is a 47 y.o., male.  admitted on 9/23/2023 following  fell or slept wall fall from a balcony of approximally 12th and 13th feet.  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  GCS 15.  Family at bedside.  Denies any past medical history.  Pain is moderately controlled.  He has a sugar-tong splint to the right upper extremity and a short-leg splint to the right lower extremity.  His vital signs are stable.  Orthopedics and neurosurgery aware of the patient.  Both believe these may be operative  injuries pending further evaluation.  He was MRIs that are pending as well.       HAD PLIF 9/27  Pre-op Assessment    I have reviewed the Patient Summary Reports.     I have reviewed the Nursing Notes. I have reviewed the NPO Status.   I have reviewed the Medications.     Review of Systems      Physical Exam  General: Well nourished, Cooperative, Alert and Oriented    Airway:  Mallampati: II   Mouth Opening: Normal  TM Distance: Normal  Tongue: Normal  Neck ROM: Normal ROM    Dental:  Intact        Anesthesia Plan  Type of Anesthesia, risks & benefits discussed:    Anesthesia Type: Gen ETT  Intra-op Monitoring Plan: Standard ASA Monitors  Post Op Pain Control Plan: multimodal analgesia  Induction:  IV  Airway Plan: Direct, Post-Induction  Informed Consent: Informed consent signed with the Patient and all parties understand the risks and agree with anesthesia plan.  All questions answered. Patient consented to blood products? Yes  ASA Score: 2  Day of Surgery Review of History & Physical: H&P Update referred to the  surgeon/provider.    Ready For Surgery From Anesthesia Perspective.     .

## 2023-09-29 NOTE — PROGRESS NOTES
[facility] - [facility] ICU  Pulmonary Critical Care Note    Patient Name: José Henry  MRN: [id number]  Admission Date: 9/23/2023  Hospital Length of Stay: 6 days  Code Status: Full Code  Attending Provider: Tyler Lorenzana Jr., *  Primary Care Provider: Brayden Welsh MD     Subjective:     HPI:   47-year-old male presents to the hospital as a trauma transfer.  By report he fell or slept wall fall from a balcony of approximally 12th and 13th feet.  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 Admitted to ICU for close monitoring and frequent neuro checks.    Hospital Course/Significant events:  9/27:  Posterior lateral fusion from L1-L5 after decompressive laminectomy at L2-3.    24 Hour Interval History:  No new problems overnight.  Scheduled for repair of right hip fracture today.    Past Medical History:   Diagnosis Date    GERD (gastroesophageal reflux disease)     Smoker        Past Surgical History:   Procedure Laterality Date    POSTERIOR LUMBAR INTERBODY FUSION (PLIF) WITH COMPUTER-ASSISTED NAVIGATION N/A 9/27/2023    Procedure: FUSION, SPINE, LUMBAR, PLIF, USING COMPUTER-ASSISTED NAVIGATION;  Surgeon: Kiera Diaz MD;  Location: [facility];  Service: Neurosurgery;  Laterality: N/A;  L1-L5 fixation // L2-L3 laminectomy // NTI // medtronic // O-arm       Social History     Socioeconomic History    Marital status:    Tobacco Use    Smokeless tobacco: Current   Substance and Sexual Activity    Alcohol use: Yes     Alcohol/week: 6.0 standard drinks of alcohol     Types: 6 Cans of beer per week     Comment: drinks 5x per week/ 6 or more beers           Current Outpatient Medications   Medication Instructions     dextroamphetamine-amphetamine 30 mg Tab 1 tablet, Oral, Daily    pantoprazole (PROTONIX) 40 MG tablet 1 tablet, Oral, Daily       Current Inpatient Medications   bisacodyL  10 mg Rectal Once    calcium-vitamin D3  1 tablet Oral BID    ceFAZolin (ANCEF) IVPB  1 g Intravenous Q8H    famotidine (PF)  20 mg Intravenous BID    folic acid  1 mg Oral Daily    gabapentin  300 mg Oral TID    methocarbamoL  500 mg Intravenous Q8H    multivitamin  1 tablet Oral Daily    thiamine  100 mg Oral Daily       Current Intravenous Infusions   dexmedeTOMIDine (Precedex) infusion (titrating) Stopped (09/28/23 0714)    lactated ringers 125 mL/hr at 09/28/23 2205         Review of Systems   All other systems reviewed and are negative.         Objective:       Intake/Output Summary (Last 24 hours) at 9/29/2023 0758  Last data filed at 9/29/2023 0600  Gross per 24 hour   Intake 4540.72 ml   Output 3740 ml   Net 800.72 ml         Vital Signs (Most Recent):  Temp: 99 °F (37.2 °C) (09/29/23 0000)  Pulse: 98 (09/29/23 0600)  Resp: (!) 21 (09/29/23 0600)  BP: 127/87 (09/29/23 0600)  SpO2: 95 % (09/29/23 0600)  Body mass index is 26.58 kg/m².  Weight: 93.9 kg (207 lb) Vital Signs (24h Range):  Temp:  [98.5 °F (36.9 °C)-99.1 °F (37.3 °C)] 99 °F (37.2 °C)  Pulse:  [] 98  Resp:  [14-28] 21  SpO2:  [93 %-99 %] 95 %  BP: (116-160)/() 127/87     Physical Exam  General-young man in no distress.  HEENT-conjunctiva pink sclerae nonicteric.  Chest-clear breath sounds anteriorly  CV-regular rhythm  Abdomen-bowel sounds active soft and nondistended.  Extremities-right leg with Ace bandage and immobilized.  Neuro-moves all extremities well and no focal deficits noted.    Lines/Drains/Airways       Drain  Duration                  Closed/Suction Drain 09/27/23 1345 Tube - 1 Midline Back Accordion 10 Fr. 1 day         NG/OG Tube 09/27/23 1730 nasogastric Left nostril 1 day              Peripheral Intravenous Line  Duration                   Peripheral IV - Single Lumen 09/26/23 2200 Anterior;Left Forearm 2 days         Peripheral IV - Single Lumen 09/27/23 0728 18 G Left Hand 2 days                    Significant Labs:    Lab Results   Component Value Date    WBC 7.21 09/29/2023    WBC 7.21 09/29/2023    HGB 10.8 (L) 09/29/2023    HCT 31.7 (L) 09/29/2023    MCV 88.3 09/29/2023     09/29/2023           BMP  Lab Results   Component Value Date     09/29/2023    K 3.7 09/29/2023    CHLORIDE 103 09/29/2023    CO2 28 09/29/2023    BUN 14.4 09/29/2023    CREATININE 0.74 09/29/2023    CALCIUM 8.0 (L) 09/29/2023            Significant Imaging:  I have reviewed the pertinent imaging within the past 24 hours.        Assessment/Plan:     Assessment  Multi trauma 9/23 requiring admit to ICU for neuro checks.  Status post lumbar decompression with posterior lateral fusion of L1 through L5 on 09/27.  Scheduled for repair of right hip fracture today.      Plan  Continue ICU for frequent neuro checks.  Plans for surgery today and will check patient again postop.       VIOLET Lindquist MD  Pulmonary Critical Care Medicine  [facility] - [facility] ICU

## 2023-09-29 NOTE — PLAN OF CARE
"Brief intervention completetd with pt who did accept "Rethinking Drinking" Brief discussionPt receiptive  "

## 2023-09-29 NOTE — TRANSFER OF CARE
"Anesthesia Transfer of Care Note    Patient: José Henry    Procedure(s) Performed: Procedure(s) (LRB):  ARTHROPLASTY, HIP (Right)    Patient location: PACU    Anesthesia Type: general    Transport from OR: Transported from OR on room air with adequate spontaneous ventilation    Post pain: adequate analgesia    Post assessment: no apparent anesthetic complications and tolerated procedure well    Post vital signs: stable    Level of consciousness: awake    Nausea/Vomiting: no nausea/vomiting    Complications: none    Transfer of care protocol was followed      Last vitals:   Visit Vitals  BP (!) 151/96 (BP Location: Right arm, Patient Position: Lying)   Pulse 93   Temp 37.2 °C (99 °F) (Oral)   Resp 17   Ht 6' 2.02" (1.88 m)   Wt 93.9 kg (207 lb)   SpO2 98%   BMI 26.57 kg/m²     "

## 2023-09-29 NOTE — PROGRESS NOTES
"No acute events overnight.  Pain controlled.  Resting in bed. NPO for surgery today    Vital Signs  Temp: 99 °F (37.2 °C)  Temp Source: Oral  Pulse: 93  Heart Rate Source: Monitor, Continuous  Resp: (!) 21  SpO2: 98 %  Pulse Oximetry Type: Continuous  Flow (L/min): 1  Device (Oxygen Therapy): room air  BP: (!) 151/96  BP Location: Right arm  BP Method: Automatic  Patient Position: Lying  Arousal Level: opens eyes spontaneously  Height and Weight  Height: 6' 2" (188 cm)  Height Method: Stated  Weight: 93.9 kg (207 lb)  Weight Method: Bed Scale  Dosing Weight: 93.9 kg (207 lb)  BSA (Calculated - sq m): 2.21 sq meters  BMI (Calculated): 26.6  Weight in (lb) to have BMI = 25: 194.3]    +FHL/EHL  BCR distally  Dressing c/d/i  SILT distally    Recent Lab Results         09/29/23  0201   09/29/23  0200        Albumin/Globulin Ratio   1.4       Albumin   2.9       Alkaline Phosphatase   43       ALT   22       AST   44       BILIRUBIN TOTAL   0.4       BUN   14.4       Calcium   8.0       Chloride   103       CO2   28       Creatinine   0.74       eGFR   >60       Globulin, Total   2.1       Glucose   103       Gran # (ANC) [phone]         Hematocrit 31.7         Hemoglobin 10.8         Lymph # 0.7931         Lymphs % 11         MCH 30.1         MCHC 34.1         MCV 88.3         Metamyelocytes 1         Mono # 1.2978         Mono % 18         MPV 9.9         Myelocytes 1         Neutrophils Relative 67         nRBC 0.0         Platelet Estimate Adequate         Platelets 241         Potassium   3.7       Promyelocytes 2         PROTEIN TOTAL   5.0       RBC 3.59         RBC Morph Normal         RDW 13.2         Sodium   139       WBC 7.21          7.21                 A/P:  R midcervical femoral neck fracture  To OR today for R JESSIE  R/B/A discussed in detail  All questions answered to pateint's satisfaction  No guarantees made  To OR today for R JESSIE  "

## 2023-09-30 LAB
ABS NEUT (OLG): 5.39 X10(3)/MCL (ref 2.1–9.2)
ALBUMIN SERPL-MCNC: 2.6 G/DL (ref 3.5–5)
ALBUMIN/GLOB SERPL: 1 RATIO (ref 1.1–2)
ALP SERPL-CCNC: 41 UNIT/L (ref 40–150)
ALT SERPL-CCNC: 26 UNIT/L (ref 0–55)
AST SERPL-CCNC: 42 UNIT/L (ref 5–34)
BILIRUB SERPL-MCNC: 0.3 MG/DL
BUN SERPL-MCNC: 14.8 MG/DL (ref 8.9–20.6)
CALCIUM SERPL-MCNC: 8 MG/DL (ref 8.4–10.2)
CHLORIDE SERPL-SCNC: 101 MMOL/L (ref 98–107)
CO2 SERPL-SCNC: 25 MMOL/L (ref 22–29)
CREAT SERPL-MCNC: 0.7 MG/DL (ref 0.73–1.18)
ERYTHROCYTE [DISTWIDTH] IN BLOOD BY AUTOMATED COUNT: 13.2 % (ref 11.5–17)
GFR SERPLBLD CREATININE-BSD FMLA CKD-EPI: >60 MLS/MIN/1.73/M2
GLOBULIN SER-MCNC: 2.6 GM/DL (ref 2.4–3.5)
GLUCOSE SERPL-MCNC: 97 MG/DL (ref 74–100)
HCT VFR BLD AUTO: 26.8 % (ref 42–52)
HGB BLD-MCNC: 9.1 G/DL (ref 14–18)
INSTRUMENT WBC (OLG): 7.38 X10(3)/MCL
LYMPHOCYTES NFR BLD MANUAL: 0.96 X10(3)/MCL
LYMPHOCYTES NFR BLD MANUAL: 13 %
MAGNESIUM SERPL-MCNC: 2 MG/DL (ref 1.6–2.6)
MCH RBC QN AUTO: 30.1 PG (ref 27–31)
MCHC RBC AUTO-ENTMCNC: 34 G/DL (ref 33–36)
MCV RBC AUTO: 88.7 FL (ref 80–94)
MONOCYTES NFR BLD MANUAL: 1.03 X10(3)/MCL (ref 0.1–1.3)
MONOCYTES NFR BLD MANUAL: 14 %
NEUTROPHILS NFR BLD MANUAL: 73 %
NRBC BLD AUTO-RTO: 0 %
PHOSPHATE SERPL-MCNC: 3.9 MG/DL (ref 2.3–4.7)
PLATELET # BLD AUTO: 235 X10(3)/MCL (ref 130–400)
PLATELET # BLD EST: ADEQUATE 10*3/UL
PMV BLD AUTO: 9.7 FL (ref 7.4–10.4)
POTASSIUM SERPL-SCNC: 3.7 MMOL/L (ref 3.5–5.1)
PROT SERPL-MCNC: 5.2 GM/DL (ref 6.4–8.3)
RBC # BLD AUTO: 3.02 X10(6)/MCL (ref 4.7–6.1)
RBC MORPH BLD: NORMAL
SODIUM SERPL-SCNC: 139 MMOL/L (ref 136–145)
WBC # SPEC AUTO: 7.38 X10(3)/MCL (ref 4.5–11.5)

## 2023-09-30 PROCEDURE — 63600175 PHARM REV CODE 636 W HCPCS: Performed by: SURGERY

## 2023-09-30 PROCEDURE — 25000003 PHARM REV CODE 250: Performed by: NEUROLOGICAL SURGERY

## 2023-09-30 PROCEDURE — 25000003 PHARM REV CODE 250: Performed by: NURSE PRACTITIONER

## 2023-09-30 PROCEDURE — 63600175 PHARM REV CODE 636 W HCPCS: Performed by: NEUROLOGICAL SURGERY

## 2023-09-30 PROCEDURE — 63600175 PHARM REV CODE 636 W HCPCS: Performed by: NURSE PRACTITIONER

## 2023-09-30 PROCEDURE — 80053 COMPREHEN METABOLIC PANEL: CPT | Performed by: NURSE PRACTITIONER

## 2023-09-30 PROCEDURE — 11000001 HC ACUTE MED/SURG PRIVATE ROOM

## 2023-09-30 PROCEDURE — 85027 COMPLETE CBC AUTOMATED: CPT | Performed by: NURSE PRACTITIONER

## 2023-09-30 PROCEDURE — 84100 ASSAY OF PHOSPHORUS: CPT | Performed by: NURSE PRACTITIONER

## 2023-09-30 PROCEDURE — 25000003 PHARM REV CODE 250

## 2023-09-30 PROCEDURE — 83735 ASSAY OF MAGNESIUM: CPT | Performed by: NURSE PRACTITIONER

## 2023-09-30 PROCEDURE — 25000003 PHARM REV CODE 250: Performed by: SURGERY

## 2023-09-30 RX ORDER — ENOXAPARIN SODIUM 100 MG/ML
40 INJECTION SUBCUTANEOUS EVERY 12 HOURS
Status: DISCONTINUED | OUTPATIENT
Start: 2023-09-30 | End: 2023-10-16 | Stop reason: HOSPADM

## 2023-09-30 RX ADMIN — SODIUM CHLORIDE, POTASSIUM CHLORIDE, SODIUM LACTATE AND CALCIUM CHLORIDE: 600; 310; 30; 20 INJECTION, SOLUTION INTRAVENOUS at 12:09

## 2023-09-30 RX ADMIN — SODIUM CHLORIDE, POTASSIUM CHLORIDE, SODIUM LACTATE AND CALCIUM CHLORIDE: 600; 310; 30; 20 INJECTION, SOLUTION INTRAVENOUS at 05:09

## 2023-09-30 RX ADMIN — ENOXAPARIN SODIUM 40 MG: 40 INJECTION SUBCUTANEOUS at 08:09

## 2023-09-30 RX ADMIN — OXYCODONE HYDROCHLORIDE 10 MG: 10 TABLET ORAL at 08:09

## 2023-09-30 RX ADMIN — Medication 1 TABLET: at 08:09

## 2023-09-30 RX ADMIN — HYDROMORPHONE HYDROCHLORIDE 1 MG: 2 INJECTION INTRAMUSCULAR; INTRAVENOUS; SUBCUTANEOUS at 04:09

## 2023-09-30 RX ADMIN — HYDROMORPHONE HYDROCHLORIDE 1 MG: 2 INJECTION INTRAMUSCULAR; INTRAVENOUS; SUBCUTANEOUS at 12:09

## 2023-09-30 RX ADMIN — METHOCARBAMOL 500 MG: 100 INJECTION INTRAMUSCULAR; INTRAVENOUS at 02:09

## 2023-09-30 RX ADMIN — METOCLOPRAMIDE 10 MG: 5 INJECTION, SOLUTION INTRAMUSCULAR; INTRAVENOUS at 08:09

## 2023-09-30 RX ADMIN — DEXTROSE MONOHYDRATE 1 G: 5 INJECTION INTRAVENOUS at 12:09

## 2023-09-30 RX ADMIN — ONDANSETRON 4 MG: 2 INJECTION INTRAMUSCULAR; INTRAVENOUS at 11:09

## 2023-09-30 RX ADMIN — FOLIC ACID 1 MG: 1 TABLET ORAL at 08:09

## 2023-09-30 RX ADMIN — SODIUM CHLORIDE, POTASSIUM CHLORIDE, SODIUM LACTATE AND CALCIUM CHLORIDE: 600; 310; 30; 20 INJECTION, SOLUTION INTRAVENOUS at 09:09

## 2023-09-30 RX ADMIN — METHOCARBAMOL 500 MG: 100 INJECTION INTRAMUSCULAR; INTRAVENOUS at 10:09

## 2023-09-30 RX ADMIN — FAMOTIDINE 20 MG: 10 INJECTION, SOLUTION INTRAVENOUS at 08:09

## 2023-09-30 RX ADMIN — METOCLOPRAMIDE 10 MG: 5 INJECTION, SOLUTION INTRAMUSCULAR; INTRAVENOUS at 02:09

## 2023-09-30 RX ADMIN — GABAPENTIN 300 MG: 300 CAPSULE ORAL at 02:09

## 2023-09-30 RX ADMIN — THIAMINE HCL TAB 100 MG 100 MG: 100 TAB at 08:09

## 2023-09-30 RX ADMIN — SODIUM CHLORIDE, POTASSIUM CHLORIDE, SODIUM LACTATE AND CALCIUM CHLORIDE: 600; 310; 30; 20 INJECTION, SOLUTION INTRAVENOUS at 10:09

## 2023-09-30 RX ADMIN — HYDROMORPHONE HYDROCHLORIDE 1 MG: 2 INJECTION INTRAMUSCULAR; INTRAVENOUS; SUBCUTANEOUS at 05:09

## 2023-09-30 RX ADMIN — GABAPENTIN 300 MG: 300 CAPSULE ORAL at 08:09

## 2023-09-30 RX ADMIN — METHOCARBAMOL 500 MG: 100 INJECTION INTRAMUSCULAR; INTRAVENOUS at 05:09

## 2023-09-30 RX ADMIN — ONDANSETRON 4 MG: 2 INJECTION INTRAMUSCULAR; INTRAVENOUS at 05:09

## 2023-09-30 RX ADMIN — ONDANSETRON 4 MG: 2 INJECTION INTRAMUSCULAR; INTRAVENOUS at 12:09

## 2023-09-30 RX ADMIN — THERA TABS 1 TABLET: TAB at 08:09

## 2023-09-30 RX ADMIN — ENOXAPARIN SODIUM 40 MG: 40 INJECTION SUBCUTANEOUS at 01:09

## 2023-09-30 NOTE — PT/OT/SLP PROGRESS
Physical Therapy      Patient Name:  José Henry   MRN:  [id number]    Attempted PT eval but he refused and c/o being bloated and nauseated. Will follow-up tomorrow.

## 2023-09-30 NOTE — PROGRESS NOTES
POD#3 s/p L1 to L5 posterolateral fusion with L2-3 laminectomy for a L3 burst fracture  S/p Right JESSIE yesterday  He is lying flat in bed, NAD  His post op pain continues to improve  He continues with NG tube, output decreasing, on clear liquid diet  He is passing gas and denies abdominal pain  He denies numbness and tingling in bilateral LE    AFVSS  PERRL, EOMI  Awake and conversant  Motor exam is limited by right upper extremity and right lower extremity in splints  Strength in left upper and lower extremities is 5/5 except left iliopsoas is 3/5 secondary to pain     On the right upper and lower extremities, he is able to wiggle his fingers and toes  Normal sensation to light touch x 4 extremities  Dressing- C/D/I  Drain output 90/30, serosanguineous    Plan: We will dc his hemovac  Ok for daily dressing changes  LSO when HOB> 30 degrees  PT/OT when appropriate  SCDs for DVT prophylaxis; ok for lovenox  Rehab placement pending

## 2023-09-30 NOTE — PROGRESS NOTES
"No acute events overnight.  Pain controlled.  Resting in bed.     Vital Signs  Temp: 98.4 °F (36.9 °C)  Temp Source: Oral  Pulse: 107  Heart Rate Source: Monitor, Continuous  Resp: 17  SpO2: 96 %  Pulse Oximetry Type: Continuous  Flow (L/min): 2  Device (Oxygen Therapy): room air  BP: 133/89  BP Location: Left arm  BP Method: Automatic  Patient Position: Lying  Arousal Level: opens eyes spontaneously  Height and Weight  Height: 6' 2.02" (188 cm)  Height Method: Stated  Weight: 93.9 kg (207 lb)  Weight Method: Bed Scale  Dosing Weight: 93.9 kg (207 lb)  BSA (Calculated - sq m): 2.21 sq meters  BMI (Calculated): 26.6  Weight in (lb) to have BMI = 25: 194.4]    +FHL/EHL  BCR distally  Dressing c/d/i  SILT distally    Recent Lab Results         09/30/23  0154        Albumin/Globulin Ratio 1.0       Albumin 2.6       Alkaline Phosphatase 41       ALT 26       AST 42       BILIRUBIN TOTAL 0.3       BUN 14.8       Calcium 8.0       Chloride 101       CO2 25       Creatinine 0.70       eGFR >60       Globulin, Total 2.6       Glucose 97       Gran # (ANC) 5.3874       Hematocrit 26.8       Hemoglobin 9.1       Lymph # 0.9594       Lymphs % 13       Magnesium  2.00       MCH 30.1       MCHC 34.0       MCV 88.7       Mono # 1.0332       Mono % 14       MPV 9.7       Neutrophils Relative 73       nRBC 0.0       Phosphorus 3.9       Platelet Estimate Adequate       Platelets 235       Potassium 3.7       PROTEIN TOTAL 5.2       RBC 3.02       RBC Morph Normal       RDW 13.2       Sodium 139       WBC 7.38        7.38               A/P:  Status post JESSIE for fracture  Pain controlled  Overall patient doing well.  Therapy for mobility and ambulation.  DVT Ppx per primary team  NWB R wrist - ok to WB thru elbow  NWB RLE  Hip precautions  "

## 2023-09-30 NOTE — PLAN OF CARE
Problem: Adult Inpatient Plan of Care  Goal: Patient-Specific Goal (Individualized)  Outcome: Ongoing, Progressing  Flowsheets (Taken 9/30/2023 8930)  Individualized Care Needs: to be able to eat again soon     Problem: Skin Injury Risk Increased  Goal: Skin Health and Integrity  Outcome: Ongoing, Progressing     Problem: Infection  Goal: Absence of Infection Signs and Symptoms  Outcome: Ongoing, Progressing

## 2023-09-30 NOTE — PROGRESS NOTES
"   Trauma Surgery   Progress Note  Admit Date: 9/23/2023  HD#7  POD#1 Day Post-Op    Subjective:   Interval history:  AFVSS. POD 1 JESSIE, POD 3 PLIF. Pain controlled. NGT in, passing flatus. Hopeful for diet.     Home Meds:   Current Outpatient Medications   Medication Instructions    dextroamphetamine-amphetamine 30 mg Tab 1 tablet, Oral, Daily    pantoprazole (PROTONIX) 40 MG tablet 1 tablet, Oral, Daily      Scheduled Meds:   bisacodyL  10 mg Rectal Once    calcium-vitamin D3  1 tablet Oral BID    enoxparin  40 mg Subcutaneous Q12H (prophylaxis, 0900/2100)    famotidine (PF)  20 mg Intravenous BID    folic acid  1 mg Oral Daily    gabapentin  300 mg Oral TID    methocarbamoL  500 mg Intravenous Q8H    multivitamin  1 tablet Oral Daily    thiamine  100 mg Oral Daily     Continuous Infusions:   lactated ringers 125 mL/hr at 09/30/23 0923     PRN Meds:bisacodyL, heparin, porcine (PF), HYDROmorphone, LORazepam, melatonin, metoclopramide HCl, midazolam, ondansetron, oxyCODONE     Objective:     VITAL SIGNS: 24 HR MIN & MAX LAST   Temp  Min: 98.1 °F (36.7 °C)  Max: 98.4 °F (36.9 °C)  98.4 °F (36.9 °C)   BP  Min: 118/97  Max: 160/101  133/89    Pulse  Min: 76  Max: 110  107    Resp  Min: 11  Max: 24  17    SpO2  Min: 91 %  Max: 100 %  96 %      HT: 6' 2.02" (188 cm)  WT: 93.9 kg (207 lb)  BMI: 26.6     Intake/output:  Intake/Output - Last 3 Shifts         09/28 0700 09/29 0659 09/29 0700 09/30 0659 09/30 0700  10/01 0659    I.V. (mL/kg) 2144.3 (22.8) 564.6 (6)     IV Piggyback 2402.9 993.2     Total Intake(mL/kg) 4547.2 (48.4) 1557.8 (16.6)     Urine (mL/kg/hr) 1850 (0.8) 1575 (0.7)     Drains 1890 625     Blood  650     Total Output 3740 2850     Net +807.2 -1292.2                    Intake/Output Summary (Last 24 hours) at 9/30/2023 1028  Last data filed at 9/30/2023 0529  Gross per 24 hour   Intake 1557.77 ml   Output 2850 ml   Net -1292.23 ml         Lines/drains/airway:       Peripheral IV - Single Lumen 09/26/23 " 2200 Anterior;Left Forearm (Active)   Site Assessment Clean;Dry;Intact;No redness;No swelling;No warmth;No drainage 09/30/23 0430   Extremity Assessment Distal to IV No abnormal discoloration 09/30/23 0430   Line Status Infusing 09/30/23 0430   Dressing Status Clean;Dry;Intact 09/30/23 0430   Dressing Intervention Integrity maintained 09/30/23 0430   Number of days: 3            Peripheral IV - Single Lumen 09/27/23 0728 18 G Left Hand (Active)   Site Assessment Clean;Dry;Intact;No redness;No swelling;No warmth;No drainage 09/30/23 0430   Extremity Assessment Distal to IV No abnormal discoloration 09/30/23 0430   Line Status Flushed;Saline locked 09/30/23 0430   Dressing Status Clean;Dry;Intact 09/30/23 0430   Dressing Intervention Integrity maintained 09/30/23 0430   Number of days: 3            Closed/Suction Drain 09/27/23 1345 Tube - 1 Midline Back Accordion 10 Fr. (Active)   Site Description Unable to view 09/30/23 0430   Dressing Type Gauze 09/30/23 0430   Dressing Status Clean;Dry;Intact 09/30/23 0430   Dressing Intervention Integrity maintained 09/30/23 0430   Drainage Serosanguineous 09/30/23 0430   Status Other (Comment) 09/30/23 0430   Output (mL) 30 mL 09/30/23 0529   Number of days: 2            NG/OG Tube 09/27/23 1730 nasogastric Left nostril (Active)   Placement Check placement verified by aspirate characteristics 09/30/23 0430   Distal Tube Length (cm) 53 09/30/23 0430   Tolerance no signs/symptoms of discomfort 09/30/23 0430   Securement secured to nostril center w/ adhesive device 09/30/23 0430   Clamp Status/Tolerance unclamped;no emesis;no nausea;no restlessness 09/30/23 0430   Suction Setting/Drainage Method suction at;low;intermittent setting 09/30/23 0430   Insertion Site Appearance no redness, warmth, tenderness, skin breakdown, drainage 09/30/23 0430   Drainage Bile 09/30/23 0430   Flush/Irrigation flushed w/;water;no resistance met 09/29/23 2000   Tube Output(mL)(Include Discarded  "Residual) 0 mL 09/30/23 0430   Number of days: 2       Male External Urinary Catheter 09/30/23 0500 (Active)   Collection Container Urimeter 09/30/23 0430   Securement Method secured to top of thigh w/ adhesive device 09/30/23 0430   Skin no redness;no breakdown;penis/scrotum cleansed w/ soap and water 09/30/23 0430   Tolerance no signs/symptoms of discomfort;assisted with appliance change 09/30/23 0430   Output (mL) 50 mL 09/30/23 0529   Catheter Change Date 09/30/23 09/30/23 0430   Catheter Change Time 0430 09/30/23 0430   Number of days: 0       Physical examination:  Gen: NAD, AAOx3, answering questions appropriately  HEENT: normocephalic, atraumatic, EOMI, PERRL, NGT  CV: RR  Resp: NWOB  Abd: S/NT/ND  Msk: moving all extremities spontaneously and purposefully- reduced to RUE/RLE  Neuro: CN II-XII grossly intact GCS 15  Skin/wounds: warm dry, splint to RUE, RLE     Labs:  Renal:  Recent Labs     09/27/23 1806 09/28/23 0039 09/29/23 0200 09/30/23  0154   BUN 25.5* 23.4* 14.4 14.8   CREATININE 1.39* 1.26* 0.74 0.70*     No results for input(s): "LACTIC" in the last 72 hours.  FEN/GI:  Recent Labs     09/27/23 1806 09/28/23 0039 09/29/23 0200 09/30/23  0154    137 139 139   K 4.0 3.8 3.7 3.7   CO2 29 28 28 25   CALCIUM 8.7 8.5 8.0* 8.0*   MG  --   --   --  2.00   PHOS  --   --   --  3.9   ALBUMIN 3.9 3.3* 2.9* 2.6*   BILITOT 0.6 0.6 0.4 0.3   AST 48* 56* 44* 42*   ALKPHOS 50 46 43 41   ALT 25 26 22 26     Heme:  Recent Labs     09/27/23 1806 09/28/23 0039 09/29/23  0201 09/30/23  0154   HGB 13.8* 12.6* 10.8* 9.1*   HCT 39.1* 35.7* 31.7* 26.8*    248 241 235   INR 1.0  --   --   --      ID:  Recent Labs     09/29/23  0201 09/30/23  0154   WBC 7.21  7.21 7.38  7.38     CBG:  Recent Labs     09/27/23  1806 09/28/23  0039 09/29/23  0200 09/30/23  0154   GLUCOSE 151* 158* 103* 97      No results for input(s): "POCTGLUCOSE" in the last 72 hours.   Cardiovascular:  No results for input(s): " ""TROPONINI", "CKTOTAL", "CKMB", "BNP" in the last 168 hours.  I have reviewed all pertinent lab results within the past 24 hours.    Imaging:  X-Ray Hip 1 View Right (with Pelvis when performed)   Final Result      Expected postoperative changes.         Electronically signed by: Stevo Thorne   Date:    09/29/2023   Time:    18:23      X-Ray Chest 1 View   Final Result      XR Gastric tube check, non-radiologist performed   Final Result      Nasogastric tube terminates within the gastric fundus.         Electronically signed by: Desmond Villa   Date:    09/27/2023   Time:    17:51      SURG FL Surgery Fluoro Usage   Final Result      X-Ray Abdomen AP 1 View   Final Result      Progressive gaseous distension of the bowel.  Consider ileus versus distal obstruction.         Electronically signed by: Kathie Atwood   Date:    09/27/2023   Time:    06:42      MRI Lumbar Spine Without Contrast   Final Result      1. L3 vertebral body superior half acute compression deformity with paraspinal soft inflammations.  Combination retropulsed bony fragment and ventral epidural hematoma results in central canal stenosis.      2. Lumbar degenerative disc disease and spondylosis level by level discussed above.         Electronically signed by: Desmond Villa   Date:    09/23/2023   Time:    15:40      MRI Thoracic Spine Without Contrast   Final Result      No evidence for acute osseous finding or static listhesis      No significant herniation or bulge.  No canal, cord, or foraminal compromise      Mild thoracic spondylosis.         Electronically signed by: Jose Antonio Mak MD   Date:    09/23/2023   Time:    15:33      CT Ankle (Including Hindfoot) Without Contrast Right   Final Result      Calcaneus fracture as described above.         Electronically signed by: Jose Antonio Mak MD   Date:    09/23/2023   Time:    14:21      X-Ray Hand 3 view Left   Final Result      No acute osseous abnormality identified.         Electronically " signed by: Desmond Villa   Date:    09/23/2023   Time:    13:53      X-Ray Knee Complete 4 or More Views Left   Final Result      No acute osseous abnormality identified.         Electronically signed by: Desmond Villa   Date:    09/23/2023   Time:    13:51      Xray Previous   Final Result      Xray Previous   Final Result      Xray Previous   Final Result      CT Previous   Final Result      CT Previous   Final Result      CT Previous   Final Result      CT Previous   Final Result      X-Ray Pelvis Routine AP   Final Result      Right subcapital femoral neck fracture.         Electronically signed by: Jose Antonio Mak MD   Date:    09/23/2023   Time:    12:31      X-Ray Femur Ap/Lat Right   Final Result      Right subcapital femoral neck fracture.         Electronically signed by: Jose Antonio Mak MD   Date:    09/23/2023   Time:    12:27      X-Ray Wrist Complete Right   Final Result      Mildly displaced dorsal triquetral fracture.      Nondisplaced distal radius fracture.         Electronically signed by: Jose Antonio Mak MD   Date:    09/23/2023   Time:    11:45      X-Ray Ankle Complete Right   Final Result      Fractured calcaneus.         Electronically signed by: Desmond Villa   Date:    09/23/2023   Time:    11:45         I have reviewed all pertinent imaging results/findings within the past 24 hours.    Micro/Path/Other:  Microbiology Results (last 7 days)       ** No results found for the last 168 hours. **           Specimen (168h ago, onward)       Start     Ordered    09/29/23 1655  Specimen to Pathology  RELEASE UPON ORDERING        References:    Click here for ordering Quick Tip   Question:  Release to patient  Answer:  Immediate    09/29/23 1655                     Problems list:  Active Problem List with Overview Notes    Diagnosis Date Noted    Closed nondisplaced fracture of right calcaneus 09/24/2023    Closed fracture of right femur 09/24/2023    Epidural hematoma 09/24/2023    Spinal stenosis of  lumbar region 09/24/2023    Closed fracture of third lumbar vertebra 09/23/2023    Radius fracture 09/23/2023    Closed right hip fracture 09/23/2023    Displaced fracture of body of right talus, initial encounter for closed fracture 09/23/2023        Assessment & Plan:   47 y.o. male admitted on 9/23/2023 following  fell or slept wall fall from a balcony of approximally 12th and 13th feet.  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         Consults:   Neurosurgery  Orthopedic surgery   Therapy:  Physical Therapy  Occupational Therapy Weight bearing status:   Will verify WB status with Ortho  Precautions:  Fall and Spinal   Seizure prophylaxis:  Not indicated. VTE prophylaxis:     Prophylactic Lovenox 40mg BID - starting today  GI prophylaxis:  H2B   Outpatient follow up:  Orthopedic surgery  Neurosurgery  Disposition:  Pending progress with therapy     - Transfer to floor  - DC NGT, CLD and ADAT   - Daily labs  - MM pain control  - CIWA   - IVF , DC with diet   - IS  - Therapy as above- eval today with TLSO  - VTE prevention as above - started today per NSGY recs     [facility]   Trauma/Acute Care Surgery  [facility]  C: [phone]

## 2023-09-30 NOTE — NURSING
Nurses Note -- 4 Eyes      9/30/2023   3:39 AM      Skin assessed during: Daily Assessment      [x] No Altered Skin Integrity Present    [x]Prevention Measures Documented      [] Yes- Altered Skin Integrity Present or Discovered   [] LDA Added if Not in Epic (Describe Wound)   [] New Altered Skin Integrity was Present on Admit and Documented in LDA   [] Wound Image Taken    Wound Care Consulted? No    Attending Nurse:  April Iyer RN    Second RN/Staff Member:   Tina Juarez RN

## 2023-09-30 NOTE — ANESTHESIA POSTPROCEDURE EVALUATION
Anesthesia Post Evaluation    Patient: José Henry    Procedure(s) Performed: Procedure(s) (LRB):  ARTHROPLASTY, HIP (Right)    Final Anesthesia Type: general      Patient location during evaluation: PACU  Patient participation: Yes- Able to Participate  Level of consciousness: awake and alert  Post-procedure vital signs: reviewed and stable  Pain management: adequate  Airway patency: patent  MANISH mitigation strategies: Multimodal analgesia  PONV status at discharge: No PONV  Anesthetic complications: no      Cardiovascular status: blood pressure returned to baseline and hemodynamically stable  Respiratory status: unassisted  Hydration status: euvolemic  Follow-up not needed.          Vitals Value Taken Time   /93 09/29/23 1821   Temp 36.7 °C (98.1 °F) 09/29/23 1821   Pulse 86 09/29/23 1927   Resp 15 09/29/23 1927   SpO2 96 % 09/29/23 1927   Vitals shown include unvalidated device data.      Event Time   Out of Recovery 09/29/2023 18:30:00         Pain/Luis E Score: Pain Rating Prior to Med Admin: 7 (9/29/2023  6:17 PM)  Pain Rating Post Med Admin: 0 (9/29/2023  2:58 AM)  Luis E Score: 10 (9/29/2023  6:30 PM)

## 2023-09-30 NOTE — NURSING
Nurses Note -- 4 Eyes      9/30/2023   5:45 AM      Skin assessed during: Q Shift Change      [] No Altered Skin Integrity Present    [x]Prevention Measures Documented      [x] Yes- Altered Skin Integrity Present or Discovered   [] LDA Added if Not in Epic (Describe Wound)   [] New Altered Skin Integrity was Present on Admit and Documented in LDA   [] Wound Image Taken    Wound Care Consulted? No    Attending Nurse:  Gary Fong RN/Staff Member:  Abhay Vila RN

## 2023-10-01 LAB
ABS NEUT (OLG): 10.89 X10(3)/MCL (ref 2.1–9.2)
ALBUMIN SERPL-MCNC: 3 G/DL (ref 3.5–5)
ALBUMIN/GLOB SERPL: 1.2 RATIO (ref 1.1–2)
ALP SERPL-CCNC: 49 UNIT/L (ref 40–150)
ALT SERPL-CCNC: 24 UNIT/L (ref 0–55)
AST SERPL-CCNC: 36 UNIT/L (ref 5–34)
BILIRUB SERPL-MCNC: 0.5 MG/DL
BUN SERPL-MCNC: 12.9 MG/DL (ref 8.9–20.6)
CALCIUM SERPL-MCNC: 8.4 MG/DL (ref 8.4–10.2)
CHLORIDE SERPL-SCNC: 97 MMOL/L (ref 98–107)
CO2 SERPL-SCNC: 28 MMOL/L (ref 22–29)
CREAT SERPL-MCNC: 0.67 MG/DL (ref 0.73–1.18)
ERYTHROCYTE [DISTWIDTH] IN BLOOD BY AUTOMATED COUNT: 13.3 % (ref 11.5–17)
GFR SERPLBLD CREATININE-BSD FMLA CKD-EPI: >60 MLS/MIN/1.73/M2
GLOBULIN SER-MCNC: 2.5 GM/DL (ref 2.4–3.5)
GLUCOSE SERPL-MCNC: 108 MG/DL (ref 74–100)
HCT VFR BLD AUTO: 29.1 % (ref 42–52)
HGB BLD-MCNC: 9.9 G/DL (ref 14–18)
INSTRUMENT WBC (OLG): 15.13 X10(3)/MCL
LYMPHOCYTES NFR BLD MANUAL: 1.51 X10(3)/MCL
LYMPHOCYTES NFR BLD MANUAL: 10 %
MAGNESIUM SERPL-MCNC: 2.1 MG/DL (ref 1.6–2.6)
MCH RBC QN AUTO: 30 PG (ref 27–31)
MCHC RBC AUTO-ENTMCNC: 34 G/DL (ref 33–36)
MCV RBC AUTO: 88.2 FL (ref 80–94)
MONOCYTES NFR BLD MANUAL: 17 %
MONOCYTES NFR BLD MANUAL: 2.57 X10(3)/MCL (ref 0.1–1.3)
MYELOCYTES NFR BLD MANUAL: 2 %
NEUTROPHILS NFR BLD MANUAL: 72 %
NRBC BLD AUTO-RTO: 0 %
PHOSPHATE SERPL-MCNC: 3.9 MG/DL (ref 2.3–4.7)
PLATELET # BLD AUTO: 406 X10(3)/MCL (ref 130–400)
PLATELET # BLD EST: ABNORMAL 10*3/UL
PMV BLD AUTO: 10.1 FL (ref 7.4–10.4)
POIKILOCYTOSIS BLD QL SMEAR: ABNORMAL
POTASSIUM SERPL-SCNC: 3.4 MMOL/L (ref 3.5–5.1)
PROT SERPL-MCNC: 5.5 GM/DL (ref 6.4–8.3)
RBC # BLD AUTO: 3.3 X10(6)/MCL (ref 4.7–6.1)
RBC MORPH BLD: ABNORMAL
SODIUM SERPL-SCNC: 141 MMOL/L (ref 136–145)
TARGETS BLD QL SMEAR: ABNORMAL
WBC # SPEC AUTO: 15.13 X10(3)/MCL (ref 4.5–11.5)

## 2023-10-01 PROCEDURE — 83735 ASSAY OF MAGNESIUM: CPT | Performed by: NURSE PRACTITIONER

## 2023-10-01 PROCEDURE — 85027 COMPLETE CBC AUTOMATED: CPT | Performed by: NURSE PRACTITIONER

## 2023-10-01 PROCEDURE — 97530 THERAPEUTIC ACTIVITIES: CPT

## 2023-10-01 PROCEDURE — 97167 OT EVAL HIGH COMPLEX 60 MIN: CPT

## 2023-10-01 PROCEDURE — 63600175 PHARM REV CODE 636 W HCPCS: Performed by: NURSE PRACTITIONER

## 2023-10-01 PROCEDURE — 80053 COMPREHEN METABOLIC PANEL: CPT | Performed by: NURSE PRACTITIONER

## 2023-10-01 PROCEDURE — 63600175 PHARM REV CODE 636 W HCPCS: Performed by: NEUROLOGICAL SURGERY

## 2023-10-01 PROCEDURE — 11000001 HC ACUTE MED/SURG PRIVATE ROOM

## 2023-10-01 PROCEDURE — 84100 ASSAY OF PHOSPHORUS: CPT | Performed by: NURSE PRACTITIONER

## 2023-10-01 PROCEDURE — 63600175 PHARM REV CODE 636 W HCPCS: Performed by: SURGERY

## 2023-10-01 PROCEDURE — 25000003 PHARM REV CODE 250: Performed by: NURSE PRACTITIONER

## 2023-10-01 PROCEDURE — 25000003 PHARM REV CODE 250: Performed by: STUDENT IN AN ORGANIZED HEALTH CARE EDUCATION/TRAINING PROGRAM

## 2023-10-01 PROCEDURE — 63600175 PHARM REV CODE 636 W HCPCS

## 2023-10-01 PROCEDURE — 97163 PT EVAL HIGH COMPLEX 45 MIN: CPT

## 2023-10-01 RX ORDER — PROMETHAZINE HYDROCHLORIDE 25 MG/ML
25 INJECTION, SOLUTION INTRAMUSCULAR; INTRAVENOUS EVERY 6 HOURS PRN
Status: DISCONTINUED | OUTPATIENT
Start: 2023-10-01 | End: 2023-10-06

## 2023-10-01 RX ORDER — BISACODYL 10 MG
10 SUPPOSITORY, RECTAL RECTAL DAILY
Status: DISCONTINUED | OUTPATIENT
Start: 2023-10-01 | End: 2023-10-02

## 2023-10-01 RX ADMIN — ENOXAPARIN SODIUM 40 MG: 40 INJECTION SUBCUTANEOUS at 08:10

## 2023-10-01 RX ADMIN — BISACODYL 10 MG: 10 SUPPOSITORY RECTAL at 09:10

## 2023-10-01 RX ADMIN — SODIUM CHLORIDE, POTASSIUM CHLORIDE, SODIUM LACTATE AND CALCIUM CHLORIDE: 600; 310; 30; 20 INJECTION, SOLUTION INTRAVENOUS at 07:10

## 2023-10-01 RX ADMIN — SODIUM CHLORIDE, POTASSIUM CHLORIDE, SODIUM LACTATE AND CALCIUM CHLORIDE: 600; 310; 30; 20 INJECTION, SOLUTION INTRAVENOUS at 09:10

## 2023-10-01 RX ADMIN — FAMOTIDINE 20 MG: 10 INJECTION, SOLUTION INTRAVENOUS at 08:10

## 2023-10-01 RX ADMIN — PROMETHAZINE HYDROCHLORIDE 25 MG: 25 INJECTION INTRAMUSCULAR; INTRAVENOUS at 01:10

## 2023-10-01 RX ADMIN — HYDROMORPHONE HYDROCHLORIDE 1 MG: 2 INJECTION INTRAMUSCULAR; INTRAVENOUS; SUBCUTANEOUS at 09:10

## 2023-10-01 RX ADMIN — HYDROMORPHONE HYDROCHLORIDE 1 MG: 2 INJECTION INTRAMUSCULAR; INTRAVENOUS; SUBCUTANEOUS at 04:10

## 2023-10-01 RX ADMIN — ENOXAPARIN SODIUM 40 MG: 40 INJECTION SUBCUTANEOUS at 09:10

## 2023-10-01 RX ADMIN — PROMETHAZINE HYDROCHLORIDE 25 MG: 25 INJECTION INTRAMUSCULAR; INTRAVENOUS at 03:10

## 2023-10-01 RX ADMIN — FAMOTIDINE 20 MG: 10 INJECTION, SOLUTION INTRAVENOUS at 09:10

## 2023-10-01 RX ADMIN — PROMETHAZINE HYDROCHLORIDE 25 MG: 25 INJECTION INTRAMUSCULAR; INTRAVENOUS at 09:10

## 2023-10-01 RX ADMIN — METHOCARBAMOL 500 MG: 100 INJECTION INTRAMUSCULAR; INTRAVENOUS at 05:10

## 2023-10-01 RX ADMIN — ONDANSETRON 4 MG: 2 INJECTION INTRAMUSCULAR; INTRAVENOUS at 07:10

## 2023-10-01 NOTE — PLAN OF CARE
Problem: Physical Therapy  Goal: Physical Therapy Goal  Description: Goals to be met by: 2023     Patient will increase functional independence with mobility by performin. Supine to sit with Modified [place]  2. Sit to stand transfer with Modified [place]  3. Gait  x 150 feet with Modified [place] using platform RW.     Outcome: Ongoing, Progressing

## 2023-10-01 NOTE — PLAN OF CARE
Problem: Skin Injury Risk Increased  Goal: Skin Health and Integrity  9/30/2023 1923 by Bancroft, Alexander, RN  Outcome: Ongoing, Progressing  9/30/2023 0546 by Bancroft, Alexander, RN  Outcome: Ongoing, Progressing     Problem: Infection  Goal: Absence of Infection Signs and Symptoms  9/30/2023 1923 by Bancroft, Alexander, RN  Outcome: Ongoing, Progressing  9/30/2023 0546 by Bancroft, Alexander, RN  Outcome: Ongoing, Progressing     Problem: Fall Injury Risk  Goal: Absence of Fall and Fall-Related Injury  Outcome: Ongoing, Progressing     Problem: Adult Inpatient Plan of Care  Goal: Patient-Specific Goal (Individualized)  9/30/2023 1923 by Bancroft, Alexander, RN  Outcome: Ongoing, Not Progressing  9/30/2023 0546 by Bancroft, Alexander, RN  Outcome: Ongoing, Progressing  Flowsheets (Taken 9/30/2023 0546)  Individualized Care Needs: to be able to eat again soon

## 2023-10-01 NOTE — PT/OT/SLP EVAL
Occupational Therapy  Evaluation    Name: José Henry  MRN: [id number]  Admitting Diagnosis: Fall from balcony: R femoral neck fx s/p JESSIE, R calcaneal fx(non-op), R talus fx, R distal radius fx/R ulnar styloid fx (non-op), L3 kassandra fx s/p L1-L5 posterolateral fusion c L2-3 laminectomy   Recent Surgery: Procedure(s) (LRB):  ARTHROPLASTY, HIP (Right) 2 Days Post-Op    Recommendations:     Discharge Recommendations:  (Rehab, pending progress c therapy)  Discharge Equipment Recommendations:  platform, walker, rolling, bedside commode, bath bench  Barriers to discharge:  Other (Comment) (Severity of deficits)    Assessment:     José Henry is a 47 y.o. male with a medical diagnosis of Fall from balcony: R femoral neck fx s/p JESSIE, R calcaneal fx(non-op), R talus fx, R distal radius fx/R ulnar styloid fx (non-op), L3 kassandra fx s/p L1-L5 posterolateral fusion c L2-3 laminectomy .  He presents with the following performance deficits affecting function: weakness, impaired functional mobility, impaired endurance, pain, impaired balance, decreased upper extremity function, impaired self care skills, decreased lower extremity function, orthopedic precautions.  Pt motivated to mobilize and participate in eval. Pt required Mod/Min A for mobilization. Pt educated on WB, spinal, and hip precautions and how this applies to ADLs- pt verbalized understanding. Pt reported he will have a lot of support and assistance at d/c. Upon discharge, this patient would benefit from rehab placement pending progress c therapy.    Rehab Prognosis: Good; patient would benefit from acute skilled OT services to address these deficits and reach maximum level of function.       Plan:     Patient to be seen 5 x/week to address the above listed problems via self-care/home management, therapeutic activities, therapeutic exercises  Plan of Care Expires: 10/29/23  Plan of Care Reviewed with: patient    Subjective     Chief Complaint: Pain in R hip and back    Patient/Family Comments/goals: To return to PLOF     Occupational Profile:  Living Environment: Pt lives in a 2 story home c his two children (16&18). Reported he has access to a bedroom and bathroom on first floor. Stated there are steps to enter his living room, but he is having a ramp built.  Previous level of function: IND c ADLs and mobility   Roles and Routines: Father, works as an    Equipment Used at Home: none  Assistance upon Discharge: Pt reported he will have a lot of support and assistance at d/c.    Pain/Comfort:  Pain Rating 1: 5/10  Location - Side 1: Right  Location 1: hip  Pain Addressed 1: Reposition, Nurse notified  Location 2: back  Pain Addressed 2: Reposition, Nurse notified    Patients cultural, spiritual, Scientologist conflicts given the current situation: no    Objective:     OT communicated with RN prior to session.      Patient was found supine with blood pressure cuff, telemetry, peripheral IV, Condom Catheter, pulse ox (continuous), NG tube upon OT entry to room.    General Precautions: Standard, fall  Orthopedic Precautions: spinal precautions, RLE non weight bearing, RLE posterior precautions (NWB R hand/wrist, OK to use PRW)  Braces:  (TLSO when HOB >30 degrees)    Vital Signs: Blood Pressure: 138/96  HR: 107 bpm  Sp02: 98%    Bed Mobility:    Patient completed Rolling/Turning to Left with  moderate assistance  Patient completed Rolling/Turning to Right with moderate assistance  Patient completed Supine to Sit with moderate assistance  Patient completed Sit to Supine with moderate assistance    Functional Mobility/Transfers:  Patient completed Sit <> Stand Transfer with moderate assistance  with  platform walker   Patient completed Bed <> Chair Transfer using hop to technique with minimum assistance with platform walker  Functional Mobility: Pt transferred to chair using PRW c Min A to hop/pivot.     Activities of Daily Living:  Upper Body Dressing: maximal assistance to  don TLSO in supine; Pt able to roll R<>L c Mod A and assisted c placing arms through straps.   Lower Body Dressing: total assistance Don socks       Functional Cognition:  Intact    Visual Perceptual Skills:  Intact    Upper Extremity Function:  Right Upper Extremity:   Not tested 2/2 precautions    Left Upper Extremity:  WFL      Therapeutic Positioning  Risk for acquired pressure injuries is decreased due to ability to get to BSC/toilet with assist.    OT interventions performed during the course of today's session:   Education was provided on benefits of and recommendations for therapeutic positioning    Skin assessment: all bony prominences were assessed    Findings: no redness or breakdown noted    OT recommendations for therapeutic positioning throughout hospitalization:   Follow [facility] Pressure Injury Prevention Protocol    Additional Treatment:  Therapeutic Activity: Pt left UIC at conclusion of session, but pt needing to get back to bed 2/2 needing x-ray for KUB. Pt required Mod A for sit<>stand from chair and Min A to hop/pivot using PRW back to bed. Mod A for supine>sit. Max A to doff LSO.     Patient Education:  Patient provided with verbal education education regarding OT role/goals/POC, post op precautions, and Discharge/DME recommendations.  Understanding was verbalized.     Patient left supine with all lines intact and call button in reach    GOALS:   Multidisciplinary Problems       Occupational Therapy Goals          Problem: Occupational Therapy    Goal Priority Disciplines Outcome Interventions   Occupational Therapy Goal     OT, PT/OT Ongoing, Progressing    Description: Goals to be met by 10/29/23:    Pt will complete grooming standing at sink with LRAD with SBA.  Pt will complete UB dressing with SBA.  Pt will complete LB dressing with SBA using AE  Pt will complete toileting with SBA using LRAD.  Pt will complete functional mobility to/from toilet and toilet transfer with SBA using LRAD.                           History:     Past Medical History:   Diagnosis Date    GERD (gastroesophageal reflux disease)     Smoker          Past Surgical History:   Procedure Laterality Date    POSTERIOR LUMBAR INTERBODY FUSION (PLIF) WITH COMPUTER-ASSISTED NAVIGATION N/A 9/27/2023    Procedure: FUSION, SPINE, LUMBAR, PLIF, USING COMPUTER-ASSISTED NAVIGATION;  Surgeon: Kiera Diaz MD;  Location: [facility];  Service: Neurosurgery;  Laterality: N/A;  L1-L5 fixation // L2-L3 laminectomy // NTI // medtronic // O-arm       Time Tracking:     OT Date of Treatment: 10/01/23  OT Start Time: 0841  OT Stop Time: 0919  OT Total Time (min): 38 min    Billable Minutes:Evaluation High complexity   Therapeutic Activity 1 unit    10/1/2023

## 2023-10-01 NOTE — PROGRESS NOTES
"No acute events overnight.  Pain controlled.  Resting in bed. Nausea yesterday.    Vital Signs  Temp: 99.2 °F (37.3 °C)  Temp Source: Oral  Pulse: 96  Heart Rate Source: Monitor, Continuous  Resp: 15  SpO2: 95 %  Pulse Oximetry Type: Continuous  Flow (L/min): 2  Device (Oxygen Therapy): room air  BP: (!) 126/98  BP Location: Right arm  BP Method: Automatic  Patient Position: Lying  Arousal Level: opens eyes spontaneously  Height and Weight  Height: 6' 2.02" (188 cm)  Height Method: Stated  Weight: 93.9 kg (207 lb)  Weight Method: Bed Scale  Dosing Weight: 93.9 kg (207 lb)  BSA (Calculated - sq m): 2.21 sq meters  BMI (Calculated): 26.6  Weight in (lb) to have BMI = 25: 194.4]    +FHL/EHL  BCR distally  Dressing c/d/i  SILT distally    Recent Lab Results         10/01/23  0134        Albumin/Globulin Ratio 1.2       Albumin 3.0       Alkaline Phosphatase 49       ALT 24       AST 36       BILIRUBIN TOTAL 0.5       BUN 12.9       Calcium 8.4       Chloride 97       CO2 28       Creatinine 0.67       eGFR >60       Globulin, Total 2.5       Glucose 108       Gran # (ANC) 10.8936       Hematocrit 29.1       Hemoglobin 9.9       Lymph # 1.513       Lymphs % 10       Magnesium  2.10       MCH 30.0       MCHC 34.0       MCV 88.2       Mono # 2.5721       Mono % 17       MPV 10.1       Myelocytes 2       Neutrophils Relative 72       nRBC 0.0       Phosphorus 3.9       Platelet Estimate Increased       Platelets 406       Poikilocytosis 1+       Potassium 3.4       PROTEIN TOTAL 5.5       RBC 3.30       RBC Morph Abnormal       RDW 13.3       Sodium 141       Target Cells 2+       WBC 15.13        15.13               A/P:  Status post R JESSIE  Pain controlled  Therapy for mobility and ambulation.  DVT PPx  "

## 2023-10-01 NOTE — PROGRESS NOTES
POD#4 s/p L1 to L5 posterolateral fusion with L2-3 laminectomy for a L3 burst fracture  S/p Right JESSIE  He is lying flat in bed, NAD  His back pain is much better today. He is able to tolerate sitting up better than previous days.  He continues with NG tube, output decreasing, on clear liquid diet  He is passing gas and denies abdominal pain  He denies numbness and tingling in bilateral LE    AFVSS  PERRL, EOMI  Awake and conversant  Motor exam is limited by right upper extremity and right lower extremity in splints  Strength in left upper and lower extremities is 5/5     On the right upper and lower extremities, he is able to wiggle his fingers and toes  Normal sensation to light touch x 4 extremities  Dressing- C/D/I  Drain site dry    Plan:   Continue daily dressing changes  LSO when HOB> 30 degrees  PT/OT  SCDs and lovenox for DVT prophylaxis  Rehab placement pending

## 2023-10-01 NOTE — NURSING
Nurses Note -- 4 Eyes      10/1/2023   3:44 AM      Skin assessed during: Q Shift Change      [] No Altered Skin Integrity Present    [x]Prevention Measures Documented      [x] Yes- Altered Skin Integrity Present or Discovered   [] LDA Added if Not in Epic (Describe Wound)   [] New Altered Skin Integrity was Present on Admit and Documented in LDA   [] Wound Image Taken    Wound Care Consulted? No    Attending Nurse:  Gary Fong RN/Staff Member:  SANTOS Alvarado

## 2023-10-01 NOTE — PLAN OF CARE
Problem: Occupational Therapy  Goal: Occupational Therapy Goal  Description: Goals to be met by 10/29/23:    Pt will complete grooming standing at sink with LRAD with SBA.  Pt will complete UB dressing with SBA.  Pt will complete LB dressing with SBA using AE  Pt will complete toileting with SBA using LRAD.  Pt will complete functional mobility to/from toilet and toilet transfer with SBA using LRAD.     Outcome: Ongoing, Progressing

## 2023-10-01 NOTE — PT/OT/SLP EVAL
Physical Therapy Evaluation    Patient Name:  José Henry   MRN:  [id number]    Recommendations:     Discharge Recommendations: rehabilitation facility, home, home with home health, home health PT (pending progress with therapy)   Discharge Equipment Recommendations: platform, walker, rolling, bath bench, bedside commode   Barriers to discharge: Impaired mobility and Ongoing medical needs    Assessment:     José Henry is a 47 y.o. male admitted with a medical diagnosis of fall from balcony, R hip fx, L3 burst fx with central canal stenosis, R calcaneal/talus fx, R distal radius fx. S/p R hip arthroplasty and L1-5 PLF.  He presents with the following impairments/functional limitations: weakness, impaired self care skills, impaired endurance, gait instability, impaired balance, impaired functional mobility, pain, orthopedic precautions. Pt may need rehab at d/c to improve functional mobility and tranfers. Pt tolerated transferring to and from chair today with platform walker. Will continue to monitor progress.     Rehab Prognosis: Good; patient would benefit from acute skilled PT services to address these deficits and reach maximum level of function.    Recent Surgery: Procedure(s) (LRB):  ARTHROPLASTY, HIP (Right) 2 Days Post-Op    Plan:     During this hospitalization, patient to be seen 6 x/week to address the identified rehab impairments via gait training, therapeutic activities, therapeutic exercises and progress toward the following goals:    Plan of Care Expires:  11/01/23    Subjective     Chief Complaint: wanting to get OOB  Patient/Family Comments/goals: to go home  Pain/Comfort:  Pain Rating 1: 5/10  Location - Side 1: Right  Location 1: hip  Pain Addressed 1: Reposition, Nurse notified    Patients cultural, spiritual, Gnosticist conflicts given the current situation: no    Living Environment:  Lives with two children (ages 18 and 16). No steps to enter.   Prior to admission, patients level of function  was independent and working as an .  Equipment used at home: none.   Upon discharge, patient will have assistance from family/friends.    Objective:     Communicated with nurse prior to session.  Patient found supine with NG tube, telemetry, peripheral IV, pulse ox (continuous), blood pressure cuff  upon PT entry to room.    General Precautions: Standard, fall  Orthopedic Precautions:spinal precautions, RLE posterior precautions, RLE non weight bearing, RUE non weight bearing (can WB thru R elbow)   Braces: TLSO (when HOB>30 deg)  Respiratory Status: Room air  Blood Pressure: 141/102, 96%, 110 HR      Exams:  Cognitive Exam:  Patient is oriented to Person, Place, Time, and Situation  RLE ROM: Deficits: unable to test ankle. WFL  RLE Strength: Deficits: grossly 3/5  LLE ROM: WNL  LLE Strength: Deficits: WFL  Skin integrity: Visible skin intact      Functional Mobility:  Bed Mobility:     Scooting: moderate assistance  Supine to Sit: moderate assistance and of 2 persons  Sit to Supine: moderate assistance and of 2 persons  Transfers:     Sit to Stand:  minimum assistance and of 2 persons with platform walker  Bed to Chair: minimum assistance and of 2 persons with  platform walker  using  Stand Pivot  Gait: 3 side hops at EOB with Elias with platform walker. RLE NWB.       AM-PAC 6 CLICK MOBILITY  Total Score:13       Treatment & Education:  Returned pt back to bed due to xray arriving to perform KUB>     Patient provided with verbal education education regarding ortho precautions.  Understanding was verbalized, however additional teaching warranted.     Patient left supine with all lines intact and call button in reach.    GOALS:   Multidisciplinary Problems       Physical Therapy Goals          Problem: Physical Therapy    Goal Priority Disciplines Outcome Goal Variances Interventions   Physical Therapy Goal     PT, PT/OT Ongoing, Progressing     Description: Goals to be met by: 11/1/2023     Patient will  increase functional independence with mobility by performin. Supine to sit with Modified [place]  2. Sit to stand transfer with Modified [place]  3. Gait  x 150 feet with Modified [place] using platform RW.                          History:     Past Medical History:   Diagnosis Date    GERD (gastroesophageal reflux disease)     Smoker        Past Surgical History:   Procedure Laterality Date    POSTERIOR LUMBAR INTERBODY FUSION (PLIF) WITH COMPUTER-ASSISTED NAVIGATION N/A 2023    Procedure: FUSION, SPINE, LUMBAR, PLIF, USING COMPUTER-ASSISTED NAVIGATION;  Surgeon: Kiera Diaz MD;  Location: [facility];  Service: Neurosurgery;  Laterality: N/A;  L1-L5 fixation // L2-L3 laminectomy // NTI // medtronic // O-arm       Time Tracking:     PT Received On: 10/01/23  PT Start Time: 843     PT Stop Time: 921  PT Total Time (min): 38 min     Billable Minutes: Evaluation 38      10/01/2023

## 2023-10-01 NOTE — PROGRESS NOTES
"   Trauma Surgery   Progress Note  Admit Date: 9/23/2023  HD#8  POD#2 Days Post-Op    Subjective:   Interval history:  NAEON  NGT with 2.7L output  Some n/v, hiccuping on exam  Abd soft but distended  Reports +BM +gas      Home Meds:   Current Outpatient Medications   Medication Instructions    dextroamphetamine-amphetamine 30 mg Tab 1 tablet, Oral, Daily    pantoprazole (PROTONIX) 40 MG tablet 1 tablet, Oral, Daily      Scheduled Meds:   bisacodyL  10 mg Rectal Once    bisacodyL  10 mg Rectal Daily    calcium-vitamin D3  1 tablet Oral BID    enoxparin  40 mg Subcutaneous Q12H (prophylaxis, 0900/2100)    famotidine (PF)  20 mg Intravenous BID    folic acid  1 mg Oral Daily    gabapentin  300 mg Oral TID    methocarbamoL  500 mg Intravenous Q8H    multivitamin  1 tablet Oral Daily    thiamine  100 mg Oral Daily     Continuous Infusions:   lactated ringers 125 mL/hr at 10/01/23 0653     PRN Meds:heparin, porcine (PF), HYDROmorphone, LORazepam, melatonin, metoclopramide HCl, midazolam, ondansetron, oxyCODONE, promethazine     Objective:     VITAL SIGNS: 24 HR MIN & MAX LAST   Temp  Min: 98 °F (36.7 °C)  Max: 100.1 °F (37.8 °C)  99.2 °F (37.3 °C)   BP  Min: 118/81  Max: 146/102  (!) 126/98    Pulse  Min: 86  Max: 109  96    Resp  Min: 15  Max: 26  15    SpO2  Min: 93 %  Max: 98 %  95 %      HT: 6' 2.02" (188 cm)  WT: 93.9 kg (207 lb)  BMI: 26.6     Intake/output:  Intake/Output - Last 3 Shifts         09/29 0700  09/30 0659 09/30 0700  10/01 0659 10/01 0700  10/02 0659    I.V. (mL/kg) 564.6 (6) 3217.9 (34.3)     IV Piggyback 993.2      Total Intake(mL/kg) 1557.8 (16.6) 3217.9 (34.3)     Urine (mL/kg/hr) 1575 (0.7) 1575 (0.7)     Drains 625 2800     Blood 650      Total Output 2850 4375     Net -1292.2 -1157.1                    Intake/Output Summary (Last 24 hours) at 10/1/2023 0856  Last data filed at 10/1/2023 0653  Gross per 24 hour   Intake 3217.88 ml   Output 4375 ml   Net -1157.12 ml         Lines/drains/airway:   "     Peripheral IV - Single Lumen 09/26/23 2200 Anterior;Left Forearm (Active)   Site Assessment Clean;Dry;Intact;No redness;No swelling;No warmth;No drainage 10/01/23 0400   Extremity Assessment Distal to IV No abnormal discoloration 10/01/23 0400   Line Status Infusing 10/01/23 0400   Dressing Status Clean;Dry;Intact 10/01/23 0400   Dressing Intervention Integrity maintained 10/01/23 0400   Number of days: 4            Peripheral IV - Single Lumen 09/27/23 0728 18 G Left Hand (Active)   Site Assessment Clean;Dry;Intact;No redness;No swelling;No warmth;No drainage 10/01/23 0400   Extremity Assessment Distal to IV No abnormal discoloration 10/01/23 0400   Line Status Saline locked 10/01/23 0400   Dressing Status Clean;Dry;Intact 10/01/23 0400   Dressing Intervention Integrity maintained 10/01/23 0400   Number of days: 4            NG/OG Tube 09/27/23 1730 nasogastric Left nostril (Active)   Placement Check placement verified by aspirate characteristics 10/01/23 0400   Distal Tube Length (cm) 53 10/01/23 0000   Tolerance no signs/symptoms of discomfort 10/01/23 0400   Securement secured to nostril center w/ adhesive device 10/01/23 0400   Clamp Status/Tolerance unclamped;no emesis;no nausea;no restlessness 10/01/23 0400   Suction Setting/Drainage Method suction at;low;intermittent setting 10/01/23 0400   Insertion Site Appearance no redness, warmth, tenderness, skin breakdown, drainage 10/01/23 0400   Drainage Bile;Green 10/01/23 0400   Flush/Irrigation flushed w/;water;no resistance met 09/29/23 2000   Tube Output(mL)(Include Discarded Residual) 250 mL 10/01/23 0528   Number of days: 3       Male External Urinary Catheter 09/30/23 0500 (Active)   Collection Container Urimeter 10/01/23 0400   Securement Method secured to top of thigh w/ adhesive device 10/01/23 0400   Skin no redness;no breakdown;penis/scrotum cleansed w/ soap and water 10/01/23 0400   Tolerance no signs/symptoms of discomfort;assisted with appliance  "change 10/01/23 0400   Output (mL) 100 mL 10/01/23 0528   Catheter Change Date 09/30/23 10/01/23 0400   Catheter Change Time 0430 10/01/23 0400   Number of days: 1       Physical examination:  Physical examination:  Gen: NAD, AAOx3, answering questions appropriately  HEENT: normocephalic, atraumatic, EOMI, PERRL, NGT  CV: RR  Resp: NWOB  Abd: S/distended, nontender  Msk: moving all extremities spontaneously and purposefully- reduced to RUE/RLE  Neuro: CN II-XII grossly intact GCS 15  Skin/wounds: warm dry, dressings to RUE RLE    Labs:  Renal:  Recent Labs     09/29/23  0200 09/30/23  0154 10/01/23  0134   BUN 14.4 14.8 12.9   CREATININE 0.74 0.70* 0.67*     No results for input(s): "LACTIC" in the last 72 hours.  FEN/GI:  Recent Labs     09/29/23  0200 09/30/23  0154 10/01/23  0134    139 141   K 3.7 3.7 3.4*   CO2 28 25 28   CALCIUM 8.0* 8.0* 8.4   MG  --  2.00 2.10   PHOS  --  3.9 3.9   ALBUMIN 2.9* 2.6* 3.0*   BILITOT 0.4 0.3 0.5   AST 44* 42* 36*   ALKPHOS 43 41 49   ALT 22 26 24     Heme:  Recent Labs     09/29/23  0201 09/30/23  0154 10/01/23  0134   HGB 10.8* 9.1* 9.9*   HCT 31.7* 26.8* 29.1*    235 406*     ID:  Recent Labs     09/30/23  0154 10/01/23  0134   WBC 7.38  7.38 15.13  15.13*     CBG:  Recent Labs     09/29/23  0200 09/30/23  0154 10/01/23  0134   GLUCOSE 103* 97 108*      No results for input(s): "POCTGLUCOSE" in the last 72 hours.   Cardiovascular:  No results for input(s): "TROPONINI", "CKTOTAL", "CKMB", "BNP" in the last 168 hours.  I have reviewed all pertinent lab results within the past 24 hours.    Imaging:  X-Ray Hip 1 View Right (with Pelvis when performed)   Final Result      Expected postoperative changes.         Electronically signed by: Stevo Thorne   Date:    09/29/2023   Time:    18:23      X-Ray Chest 1 View   Final Result      XR Gastric tube check, non-radiologist performed   Final Result      Nasogastric tube terminates within the gastric fundus.       "   Electronically signed by: Desmond Villa   Date:    09/27/2023   Time:    17:51      SURG FL Surgery Fluoro Usage   Final Result      X-Ray Abdomen AP 1 View   Final Result      Progressive gaseous distension of the bowel.  Consider ileus versus distal obstruction.         Electronically signed by: Kathie Atwood   Date:    09/27/2023   Time:    06:42      MRI Lumbar Spine Without Contrast   Final Result      1. L3 vertebral body superior half acute compression deformity with paraspinal soft inflammations.  Combination retropulsed bony fragment and ventral epidural hematoma results in central canal stenosis.      2. Lumbar degenerative disc disease and spondylosis level by level discussed above.         Electronically signed by: Desmond Villa   Date:    09/23/2023   Time:    15:40      MRI Thoracic Spine Without Contrast   Final Result      No evidence for acute osseous finding or static listhesis      No significant herniation or bulge.  No canal, cord, or foraminal compromise      Mild thoracic spondylosis.         Electronically signed by: Jose Antonio Mak MD   Date:    09/23/2023   Time:    15:33      CT Ankle (Including Hindfoot) Without Contrast Right   Final Result      Calcaneus fracture as described above.         Electronically signed by: Jose Antonio Mak MD   Date:    09/23/2023   Time:    14:21      X-Ray Hand 3 view Left   Final Result      No acute osseous abnormality identified.         Electronically signed by: Desmond Villa   Date:    09/23/2023   Time:    13:53      X-Ray Knee Complete 4 or More Views Left   Final Result      No acute osseous abnormality identified.         Electronically signed by: Desmond Villa   Date:    09/23/2023   Time:    13:51      Xray Previous   Final Result      Xray Previous   Final Result      Xray Previous   Final Result      CT Previous   Final Result      CT Previous   Final Result      CT Previous   Final Result      CT Previous   Final Result      X-Ray Pelvis Routine  AP   Final Result      Right subcapital femoral neck fracture.         Electronically signed by: Jose Antonio Mak MD   Date:    09/23/2023   Time:    12:31      X-Ray Femur Ap/Lat Right   Final Result      Right subcapital femoral neck fracture.         Electronically signed by: Jose Antonio Mak MD   Date:    09/23/2023   Time:    12:27      X-Ray Wrist Complete Right   Final Result      Mildly displaced dorsal triquetral fracture.      Nondisplaced distal radius fracture.         Electronically signed by: Jose Antonio Mak MD   Date:    09/23/2023   Time:    11:45      X-Ray Ankle Complete Right   Final Result      Fractured calcaneus.         Electronically signed by: Desmond Villa   Date:    09/23/2023   Time:    11:45      X-Ray Abdomen AP 1 View    (Results Pending)      I have reviewed all pertinent imaging results/findings within the past 24 hours.    Micro/Path/Other:  Microbiology Results (last 7 days)       ** No results found for the last 168 hours. **           Specimen (168h ago, onward)       Start     Ordered    09/29/23 1655  Specimen to Pathology  RELEASE UPON ORDERING        References:    Click here for ordering Quick Tip   Question:  Release to patient  Answer:  Immediate    09/29/23 1655                     Problems list:  Active Problem List with Overview Notes    Diagnosis Date Noted    Closed nondisplaced fracture of right calcaneus 09/24/2023    Closed fracture of right femur 09/24/2023    Epidural hematoma 09/24/2023    Spinal stenosis of lumbar region 09/24/2023    Closed fracture of third lumbar vertebra 09/23/2023    Radius fracture 09/23/2023    Closed right hip fracture 09/23/2023    Displaced fracture of body of right talus, initial encounter for closed fracture 09/23/2023        Assessment & Plan:   47 y.o. male admitted on 9/23/2023 following  fell or slept wall fall from a balcony of approximally 12th and 13th feet.  He was found to have right hip fracture, burst fracture of L3 with  retropulsion, central cord stenosis based on CT re, 13 mm lytic lesion with sclerotic rim and thinned zone of transition in the left iliac bone which is a incidental finding, comminuted fracture of the calcaneus with minimally displaced fracture of the talus.      Consults:   Neurosurgery  Orthopedic surgery   Therapy:  Physical Therapy  Occupational Therapy Weight bearing status:   Will verify WB status with Ortho  Precautions:  Fall and Spinal   Seizure prophylaxis:  Not indicated. VTE prophylaxis:     Prophylactic Lovenox 40mg BID GI prophylaxis:  H2B   Outpatient follow up:  Orthopedic surgery  Neurosurgery  Disposition:  Pending progress with therapy      - floor transfer pending bed availability  - NGT in place with highoutput, suspect ileus, AXR pending  - scheduled dulcolax suppository  - mIVFs, pending ileus improvement  - PT pending, refused yesterday 2/2 nausea  - rehab placement    John Montague MD  [employer] General Surgery PGY4

## 2023-10-02 LAB
ABS NEUT (OLG): 12.14 X10(3)/MCL (ref 2.1–9.2)
ALBUMIN SERPL-MCNC: 2.7 G/DL (ref 3.5–5)
ALBUMIN/GLOB SERPL: 1.2 RATIO (ref 1.1–2)
ALP SERPL-CCNC: 55 UNIT/L (ref 40–150)
ALT SERPL-CCNC: 22 UNIT/L (ref 0–55)
AST SERPL-CCNC: 30 UNIT/L (ref 5–34)
BILIRUB SERPL-MCNC: 0.5 MG/DL
BUN SERPL-MCNC: 15.2 MG/DL (ref 8.9–20.6)
CALCIUM SERPL-MCNC: 8.1 MG/DL (ref 8.4–10.2)
CHLORIDE SERPL-SCNC: 101 MMOL/L (ref 98–107)
CO2 SERPL-SCNC: 25 MMOL/L (ref 22–29)
CREAT SERPL-MCNC: 0.65 MG/DL (ref 0.73–1.18)
CRP SERPL-MCNC: 178.7 MG/L
ERYTHROCYTE [DISTWIDTH] IN BLOOD BY AUTOMATED COUNT: 13.6 % (ref 11.5–17)
GFR SERPLBLD CREATININE-BSD FMLA CKD-EPI: >60 MLS/MIN/1.73/M2
GLOBULIN SER-MCNC: 2.3 GM/DL (ref 2.4–3.5)
GLUCOSE SERPL-MCNC: 89 MG/DL (ref 74–100)
HCT VFR BLD AUTO: 26.1 % (ref 42–52)
HGB BLD-MCNC: 9.1 G/DL (ref 14–18)
INSTRUMENT WBC (OLG): 17.59 X10(3)/MCL
LYMPHOCYTES NFR BLD MANUAL: 13 %
LYMPHOCYTES NFR BLD MANUAL: 2.29 X10(3)/MCL
MACROCYTES BLD QL SMEAR: ABNORMAL
MAGNESIUM SERPL-MCNC: 1.8 MG/DL (ref 1.6–2.6)
MCH RBC QN AUTO: 30.3 PG (ref 27–31)
MCHC RBC AUTO-ENTMCNC: 34.9 G/DL (ref 33–36)
MCV RBC AUTO: 87 FL (ref 80–94)
MONOCYTES NFR BLD MANUAL: 1.93 X10(3)/MCL (ref 0.1–1.3)
MONOCYTES NFR BLD MANUAL: 11 %
MYELOCYTES NFR BLD MANUAL: 5 %
NEUTROPHILS NFR BLD MANUAL: 69 %
NRBC BLD AUTO-RTO: 0 %
PHOSPHATE SERPL-MCNC: 3.7 MG/DL (ref 2.3–4.7)
PLATELET # BLD AUTO: 435 X10(3)/MCL (ref 130–400)
PLATELET # BLD EST: ABNORMAL 10*3/UL
PMV BLD AUTO: 10.3 FL (ref 7.4–10.4)
POTASSIUM SERPL-SCNC: 3.3 MMOL/L (ref 3.5–5.1)
PREALB SERPL-MCNC: 9.2 MG/DL (ref 18–45)
PROMYELOCYTES # BLD MANUAL: 1 %
PROT SERPL-MCNC: 5 GM/DL (ref 6.4–8.3)
RBC # BLD AUTO: 3 X10(6)/MCL (ref 4.7–6.1)
RBC MORPH BLD: ABNORMAL
SODIUM SERPL-SCNC: 140 MMOL/L (ref 136–145)
WBC # SPEC AUTO: 17.59 X10(3)/MCL (ref 4.5–11.5)

## 2023-10-02 PROCEDURE — 86140 C-REACTIVE PROTEIN: CPT | Performed by: NURSE PRACTITIONER

## 2023-10-02 PROCEDURE — 84134 ASSAY OF PREALBUMIN: CPT | Performed by: NURSE PRACTITIONER

## 2023-10-02 PROCEDURE — 63600175 PHARM REV CODE 636 W HCPCS: Performed by: NURSE PRACTITIONER

## 2023-10-02 PROCEDURE — 25000003 PHARM REV CODE 250

## 2023-10-02 PROCEDURE — 11000001 HC ACUTE MED/SURG PRIVATE ROOM

## 2023-10-02 PROCEDURE — 21400001 HC TELEMETRY ROOM

## 2023-10-02 PROCEDURE — 25000003 PHARM REV CODE 250: Performed by: NURSE PRACTITIONER

## 2023-10-02 PROCEDURE — 99024 POSTOP FOLLOW-UP VISIT: CPT | Mod: ,,, | Performed by: NEUROLOGICAL SURGERY

## 2023-10-02 PROCEDURE — 63600175 PHARM REV CODE 636 W HCPCS

## 2023-10-02 PROCEDURE — 63600175 PHARM REV CODE 636 W HCPCS: Performed by: NEUROLOGICAL SURGERY

## 2023-10-02 PROCEDURE — 99024 PR POST-OP FOLLOW-UP VISIT: ICD-10-PCS | Mod: ,,, | Performed by: NEUROLOGICAL SURGERY

## 2023-10-02 PROCEDURE — 25000003 PHARM REV CODE 250: Performed by: SURGERY

## 2023-10-02 PROCEDURE — 97116 GAIT TRAINING THERAPY: CPT | Mod: CQ

## 2023-10-02 PROCEDURE — 83735 ASSAY OF MAGNESIUM: CPT | Performed by: NURSE PRACTITIONER

## 2023-10-02 PROCEDURE — 25000003 PHARM REV CODE 250: Performed by: STUDENT IN AN ORGANIZED HEALTH CARE EDUCATION/TRAINING PROGRAM

## 2023-10-02 PROCEDURE — 85027 COMPLETE CBC AUTOMATED: CPT | Performed by: NURSE PRACTITIONER

## 2023-10-02 PROCEDURE — 84100 ASSAY OF PHOSPHORUS: CPT | Performed by: NURSE PRACTITIONER

## 2023-10-02 PROCEDURE — 97530 THERAPEUTIC ACTIVITIES: CPT | Mod: CQ

## 2023-10-02 PROCEDURE — 25000003 PHARM REV CODE 250: Performed by: NEUROLOGICAL SURGERY

## 2023-10-02 PROCEDURE — 25500020 PHARM REV CODE 255: Performed by: STUDENT IN AN ORGANIZED HEALTH CARE EDUCATION/TRAINING PROGRAM

## 2023-10-02 PROCEDURE — 80053 COMPREHEN METABOLIC PANEL: CPT | Performed by: NURSE PRACTITIONER

## 2023-10-02 RX ORDER — TALC
6 POWDER (GRAM) TOPICAL NIGHTLY PRN
Status: DISCONTINUED | OUTPATIENT
Start: 2023-10-02 | End: 2023-10-03

## 2023-10-02 RX ORDER — PANTOPRAZOLE SODIUM 40 MG/10ML
40 INJECTION, POWDER, LYOPHILIZED, FOR SOLUTION INTRAVENOUS DAILY
Status: DISCONTINUED | OUTPATIENT
Start: 2023-10-03 | End: 2023-10-03

## 2023-10-02 RX ORDER — GABAPENTIN 300 MG/1
300 CAPSULE ORAL 3 TIMES DAILY
Status: DISCONTINUED | OUTPATIENT
Start: 2023-10-03 | End: 2023-10-03

## 2023-10-02 RX ORDER — FOLIC ACID 1 MG/1
1 TABLET ORAL DAILY
Status: DISCONTINUED | OUTPATIENT
Start: 2023-10-03 | End: 2023-10-03

## 2023-10-02 RX ORDER — PANTOPRAZOLE SODIUM 40 MG/1
40 TABLET, DELAYED RELEASE ORAL DAILY
Status: DISCONTINUED | OUTPATIENT
Start: 2023-10-02 | End: 2023-10-02

## 2023-10-02 RX ORDER — THIAMINE HCL 100 MG
100 TABLET ORAL DAILY
Status: DISCONTINUED | OUTPATIENT
Start: 2023-10-03 | End: 2023-10-03

## 2023-10-02 RX ORDER — LORAZEPAM 0.5 MG/1
1 TABLET ORAL EVERY 4 HOURS PRN
Status: DISCONTINUED | OUTPATIENT
Start: 2023-10-02 | End: 2023-10-03

## 2023-10-02 RX ORDER — SODIUM CHLORIDE, SODIUM LACTATE, POTASSIUM CHLORIDE, CALCIUM CHLORIDE 600; 310; 30; 20 MG/100ML; MG/100ML; MG/100ML; MG/100ML
INJECTION, SOLUTION INTRAVENOUS CONTINUOUS
Status: DISCONTINUED | OUTPATIENT
Start: 2023-10-02 | End: 2023-10-04

## 2023-10-02 RX ORDER — OXYCODONE HYDROCHLORIDE 10 MG/1
10 TABLET ORAL EVERY 4 HOURS PRN
Status: DISCONTINUED | OUTPATIENT
Start: 2023-10-02 | End: 2023-10-03

## 2023-10-02 RX ORDER — FERROUS SULFATE, DRIED 160(50) MG
1 TABLET, EXTENDED RELEASE ORAL 2 TIMES DAILY
Status: DISCONTINUED | OUTPATIENT
Start: 2023-10-03 | End: 2023-10-03

## 2023-10-02 RX ORDER — POTASSIUM CHLORIDE 20 MEQ/1
40 TABLET, EXTENDED RELEASE ORAL ONCE
Status: COMPLETED | OUTPATIENT
Start: 2023-10-02 | End: 2023-10-02

## 2023-10-02 RX ADMIN — ENOXAPARIN SODIUM 40 MG: 40 INJECTION SUBCUTANEOUS at 08:10

## 2023-10-02 RX ADMIN — SODIUM CHLORIDE, POTASSIUM CHLORIDE, SODIUM LACTATE AND CALCIUM CHLORIDE: 600; 310; 30; 20 INJECTION, SOLUTION INTRAVENOUS at 05:10

## 2023-10-02 RX ADMIN — POTASSIUM CHLORIDE 40 MEQ: 1500 TABLET, EXTENDED RELEASE ORAL at 06:10

## 2023-10-02 RX ADMIN — OXYCODONE HYDROCHLORIDE 10 MG: 10 TABLET ORAL at 02:10

## 2023-10-02 RX ADMIN — GABAPENTIN 300 MG: 300 CAPSULE ORAL at 02:10

## 2023-10-02 RX ADMIN — IOPAMIDOL 100 ML: 755 INJECTION, SOLUTION INTRAVENOUS at 12:10

## 2023-10-02 RX ADMIN — METOCLOPRAMIDE 10 MG: 5 INJECTION, SOLUTION INTRAMUSCULAR; INTRAVENOUS at 09:10

## 2023-10-02 RX ADMIN — SODIUM CHLORIDE, POTASSIUM CHLORIDE, SODIUM LACTATE AND CALCIUM CHLORIDE: 600; 310; 30; 20 INJECTION, SOLUTION INTRAVENOUS at 08:10

## 2023-10-02 RX ADMIN — ONDANSETRON 4 MG: 2 INJECTION INTRAMUSCULAR; INTRAVENOUS at 08:10

## 2023-10-02 RX ADMIN — OXYCODONE HYDROCHLORIDE 10 MG: 10 TABLET ORAL at 09:10

## 2023-10-02 RX ADMIN — THERA TABS 1 TABLET: TAB at 08:10

## 2023-10-02 RX ADMIN — PANTOPRAZOLE SODIUM 40 MG: 40 TABLET, DELAYED RELEASE ORAL at 08:10

## 2023-10-02 RX ADMIN — ENOXAPARIN SODIUM 40 MG: 40 INJECTION SUBCUTANEOUS at 09:10

## 2023-10-02 RX ADMIN — SODIUM CHLORIDE, POTASSIUM CHLORIDE, SODIUM LACTATE AND CALCIUM CHLORIDE: 600; 310; 30; 20 INJECTION, SOLUTION INTRAVENOUS at 02:10

## 2023-10-02 RX ADMIN — GABAPENTIN 300 MG: 300 CAPSULE ORAL at 08:10

## 2023-10-02 RX ADMIN — FOLIC ACID 1 MG: 1 TABLET ORAL at 08:10

## 2023-10-02 RX ADMIN — Medication 1 TABLET: at 08:10

## 2023-10-02 RX ADMIN — THIAMINE HCL TAB 100 MG 100 MG: 100 TAB at 08:10

## 2023-10-02 RX ADMIN — SODIUM CHLORIDE, POTASSIUM CHLORIDE, SODIUM LACTATE AND CALCIUM CHLORIDE: 600; 310; 30; 20 INJECTION, SOLUTION INTRAVENOUS at 12:10

## 2023-10-02 NOTE — PLAN OF CARE
Problem: Adult Inpatient Plan of Care  Goal: Patient-Specific Goal (Individualized)  Outcome: Ongoing, Progressing     Problem: Skin Injury Risk Increased  Goal: Skin Health and Integrity  Outcome: Ongoing, Progressing     Problem: Infection  Goal: Absence of Infection Signs and Symptoms  Outcome: Ongoing, Progressing     Problem: Fall Injury Risk  Goal: Absence of Fall and Fall-Related Injury  Outcome: Ongoing, Progressing

## 2023-10-02 NOTE — PT/OT/SLP PROGRESS
Physical Therapy Treatment    Patient Name:  José Henry   MRN:  [id number]    Recommendations:     Discharge Recommendations: rehabilitation facility, home, home with home health, home health PT (pending progress with therapy)  Discharge Equipment Recommendations: platform, walker, rolling, bath bench, bedside commode  Barriers to discharge: Impaired mobility and placement    Assessment:     José Henry is a 47 y.o. male admitted with a medical diagnosis of fall from balcony, R hip fx, L3 burst fx with central canal stenosis, R calcaneal/talus fx, R distal radius fx. S/p R hip arthroplasty and L1-5 PLF.  He presents with the following impairments/functional limitations: weakness, impaired endurance, impaired self care skills, impaired functional mobility, gait instability, impaired balance .    Rehab Prognosis: Good; patient would benefit from acute skilled PT services to address these deficits and reach maximum level of function.    Recent Surgery: Procedure(s) (LRB):  ARTHROPLASTY, HIP (Right) 3 Days Post-Op    Plan:     During this hospitalization, patient to be seen 6 x/week to address the identified rehab impairments via gait training, therapeutic activities, therapeutic exercises and progress toward the following goals:    Plan of Care Expires:  11/01/23    Subjective     Chief Complaint: LLE soreness  Patient/Family Comments/goals: get stronger  Pain/Comfort:         Objective:     Communicated with RN prior to session.  Patient found HOB elevated with peripheral IV upon PT entry to room.     General Precautions: Standard, fall  Orthopedic Precautions: spinal precautions, RLE posterior precautions, RLE non weight bearing, RUE non weight bearing (can WB thru R elbow)  Braces: TLSO (when HOB>30 deg)  Respiratory Status: Room air  Blood Pressure: 131/99  Skin Integrity: Visible skin intact      Functional Mobility:  Bed Mobility:     Supine to Sit: minimum assistance  Transfers:     Sit to Stand:  moderate  assistance with platform walker  Gait: Pt hopped 12ft with PRW [place]ass[place]. Pt able to maintain WB pxns. TSLO Donned    Therapeutic Activities/Exercises:  Pt performed 3 sit<>stands with PRW initially modA, progressing to Kassandra.    Education:  Patient provided with verbal education education regarding POC.  Understanding was verbalized, however additional teaching warranted.     Patient left up in chair with all lines intact, call button in reach, and Sister present..    GOALS:   Multidisciplinary Problems       Physical Therapy Goals          Problem: Physical Therapy    Goal Priority Disciplines Outcome Goal Variances Interventions   Physical Therapy Goal     PT, PT/OT Ongoing, Progressing     Description: Goals to be met by: 2023     Patient will increase functional independence with mobility by performin. Supine to sit with Modified [place]  2. Sit to stand transfer with Modified [place]  3. Gait  x 150 feet with Modified [place] using platform RW.                          Time Tracking:     PT Received On: 10/02/23  PT Start Time: 1516     PT Stop Time: 1540  PT Total Time (min): 24 min     Billable Minutes: Gait Training 10 and Therapeutic Activity 14    Treatment Type: Treatment  PT/PTA: PTA     Number of PTA visits since last PT visit: 1     10/02/2023

## 2023-10-02 NOTE — NURSING
Pt transported to floor via recliner in no acute distress at this time. Pt belongings transported with him. Pt AAOx4, GCS: 15

## 2023-10-02 NOTE — PROGRESS NOTES
"   Trauma Surgery   Progress Note  Admit Date: 9/23/2023  HD#9  POD#3 Days Post-Op    Subjective:   Interval history:  NAEO.  UOP 1.7L. Stool 6x. NG with 150cc output over 24 hours. Abdomen soft, non-tender.    Home Meds:   Current Outpatient Medications   Medication Instructions    dextroamphetamine-amphetamine 30 mg Tab 1 tablet, Oral, Daily    pantoprazole (PROTONIX) 40 MG tablet 1 tablet, Oral, Daily      Scheduled Meds:   bisacodyL  10 mg Rectal Once    bisacodyL  10 mg Rectal Daily    calcium-vitamin D3  1 tablet Oral BID    enoxparin  40 mg Subcutaneous Q12H (prophylaxis, 0900/2100)    famotidine (PF)  20 mg Intravenous BID    folic acid  1 mg Oral Daily    gabapentin  300 mg Oral TID    multivitamin  1 tablet Oral Daily    thiamine  100 mg Oral Daily     Continuous Infusions:   lactated ringers 125 mL/hr at 10/02/23 0244     PRN Meds:heparin, porcine (PF), HYDROmorphone, LORazepam, melatonin, metoclopramide HCl, midazolam, ondansetron, oxyCODONE, promethazine     Objective:     VITAL SIGNS: 24 HR MIN & MAX LAST   Temp  Min: 98.3 °F (36.8 °C)  Max: 98.9 °F (37.2 °C)  98.3 °F (36.8 °C)   BP  Min: 130/85  Max: 141/102  134/89    Pulse  Min: 98  Max: 110  110    Resp  Min: 15  Max: 24  (!) 24    SpO2  Min: 95 %  Max: 100 %  95 %      HT: 6' 2.02" (188 cm)  WT: 93.9 kg (207 lb)  BMI: 26.6     Intake/output:  Intake/Output - Last 3 Shifts         09/30 0700  10/01 0659 10/01 0700  10/02 0659    I.V. (mL/kg) 3217.9 (34.3) 1267.7 (13.5)    Total Intake(mL/kg) 3217.9 (34.3) 1267.7 (13.5)    Urine (mL/kg/hr) 1575 (0.7) 1700 (0.8)    Drains 2800 150    Stool  0    Total Output 4375 1850    Net -1157.1 -582.3          Stool Occurrence  6 x            Intake/Output Summary (Last 24 hours) at 10/2/2023 0631  Last data filed at 10/2/2023 0430  Gross per 24 hour   Intake 2165.69 ml   Output 1850 ml   Net 315.69 ml         Lines/drains/airway:       Peripheral IV - Single Lumen 09/26/23 2200 Anterior;Left Forearm (Active) "   Site Assessment Clean;Dry;Intact;No redness;No swelling;No warmth;No drainage 10/01/23 0400   Extremity Assessment Distal to IV No abnormal discoloration 10/01/23 0400   Line Status Infusing 10/01/23 0400   Dressing Status Clean;Dry;Intact 10/01/23 0400   Dressing Intervention Integrity maintained 10/01/23 0400   Number of days: 4            Peripheral IV - Single Lumen 09/27/23 0728 18 G Left Hand (Active)   Site Assessment Clean;Dry;Intact;No redness;No swelling;No warmth;No drainage 10/01/23 0400   Extremity Assessment Distal to IV No abnormal discoloration 10/01/23 0400   Line Status Saline locked 10/01/23 0400   Dressing Status Clean;Dry;Intact 10/01/23 0400   Dressing Intervention Integrity maintained 10/01/23 0400   Number of days: 4            NG/OG Tube 09/27/23 1730 nasogastric Left nostril (Active)   Placement Check placement verified by aspirate characteristics 10/01/23 0400   Distal Tube Length (cm) 53 10/01/23 0000   Tolerance no signs/symptoms of discomfort 10/01/23 0400   Securement secured to nostril center w/ adhesive device 10/01/23 0400   Clamp Status/Tolerance unclamped;no emesis;no nausea;no restlessness 10/01/23 0400   Suction Setting/Drainage Method suction at;low;intermittent setting 10/01/23 0400   Insertion Site Appearance no redness, warmth, tenderness, skin breakdown, drainage 10/01/23 0400   Drainage Bile;Green 10/01/23 0400   Flush/Irrigation flushed w/;water;no resistance met 09/29/23 2000   Tube Output(mL)(Include Discarded Residual) 250 mL 10/01/23 0528   Number of days: 3       Male External Urinary Catheter 09/30/23 0500 (Active)   Collection Container Urimeter 10/01/23 0400   Securement Method secured to top of thigh w/ adhesive device 10/01/23 0400   Skin no redness;no breakdown;penis/scrotum cleansed w/ soap and water 10/01/23 0400   Tolerance no signs/symptoms of discomfort;assisted with appliance change 10/01/23 0400   Output (mL) 100 mL 10/01/23 0528   Catheter Change Date  "09/30/23 10/01/23 0400   Catheter Change Time 0430 10/01/23 0400   Number of days: 1       Physical examination:  Gen: NAD, AAOx3, answering questions appropriately  HEENT: normocephalic, atraumatic, EOMI, PERRL, NGT  CV: RR  Resp: NWOB  Abd: S/distended, nontender  Msk: moving all extremities spontaneously and purposefully- reduced to RUE/RLE  Neuro: CN II-XII grossly intact GCS 15  Skin/wounds: warm dry, dressings to RUE RLE    Labs:  Renal:  Recent Labs     09/30/23  0154 10/01/23  0134 10/02/23  0116   BUN 14.8 12.9 15.2   CREATININE 0.70* 0.67* 0.65*     No results for input(s): "LACTIC" in the last 72 hours.  FEN/GI:  Recent Labs     09/30/23  0154 10/01/23  0134 10/02/23  0116    141 140   K 3.7 3.4* 3.3*   CO2 25 28 25   CALCIUM 8.0* 8.4 8.1*   MG 2.00 2.10 1.80   PHOS 3.9 3.9 3.7   ALBUMIN 2.6* 3.0* 2.7*   BILITOT 0.3 0.5 0.5   AST 42* 36* 30   ALKPHOS 41 49 55   ALT 26 24 22     Heme:  Recent Labs     09/30/23  0154 10/01/23  0134 10/02/23  0116   HGB 9.1* 9.9* 9.1*   HCT 26.8* 29.1* 26.1*    406* 435*     ID:  Recent Labs     10/01/23  0134 10/02/23  0116   WBC 15.13  15.13* 17.59  17.59*     CBG:  Recent Labs     09/30/23  0154 10/01/23  0134 10/02/23  0116   GLUCOSE 97 108* 89      No results for input(s): "POCTGLUCOSE" in the last 72 hours.   Cardiovascular:  No results for input(s): "TROPONINI", "CKTOTAL", "CKMB", "BNP" in the last 168 hours.  I have reviewed all pertinent lab results within the past 24 hours.    Imaging:  X-Ray Abdomen AP 1 View   Final Result      As above.         Electronically signed by: Stevo Thorne   Date:    10/01/2023   Time:    09:36      X-Ray Hip 1 View Right (with Pelvis when performed)   Final Result      Expected postoperative changes.         Electronically signed by: Stevo Thorne   Date:    09/29/2023   Time:    18:23      X-Ray Chest 1 View   Final Result      XR Gastric tube check, non-radiologist performed   Final Result      Nasogastric tube " terminates within the gastric fundus.         Electronically signed by: Desmond Villa   Date:    09/27/2023   Time:    17:51      SURG FL Surgery Fluoro Usage   Final Result      X-Ray Abdomen AP 1 View   Final Result      Progressive gaseous distension of the bowel.  Consider ileus versus distal obstruction.         Electronically signed by: Kathie Atwood   Date:    09/27/2023   Time:    06:42      MRI Lumbar Spine Without Contrast   Final Result      1. L3 vertebral body superior half acute compression deformity with paraspinal soft inflammations.  Combination retropulsed bony fragment and ventral epidural hematoma results in central canal stenosis.      2. Lumbar degenerative disc disease and spondylosis level by level discussed above.         Electronically signed by: Desmond Villa   Date:    09/23/2023   Time:    15:40      MRI Thoracic Spine Without Contrast   Final Result      No evidence for acute osseous finding or static listhesis      No significant herniation or bulge.  No canal, cord, or foraminal compromise      Mild thoracic spondylosis.         Electronically signed by: Jose Antonio Mak MD   Date:    09/23/2023   Time:    15:33      CT Ankle (Including Hindfoot) Without Contrast Right   Final Result      Calcaneus fracture as described above.         Electronically signed by: Jose Antonio Mak MD   Date:    09/23/2023   Time:    14:21      X-Ray Hand 3 view Left   Final Result      No acute osseous abnormality identified.         Electronically signed by: Desmond Villa   Date:    09/23/2023   Time:    13:53      X-Ray Knee Complete 4 or More Views Left   Final Result      No acute osseous abnormality identified.         Electronically signed by: Desmond Villa   Date:    09/23/2023   Time:    13:51      Xray Previous   Final Result      Xray Previous   Final Result      Xray Previous   Final Result      CT Previous   Final Result      CT Previous   Final Result      CT Previous   Final Result      CT  Previous   Final Result      X-Ray Pelvis Routine AP   Final Result      Right subcapital femoral neck fracture.         Electronically signed by: Jose Antonio Mak MD   Date:    09/23/2023   Time:    12:31      X-Ray Femur Ap/Lat Right   Final Result      Right subcapital femoral neck fracture.         Electronically signed by: Jose Antonio Mak MD   Date:    09/23/2023   Time:    12:27      X-Ray Wrist Complete Right   Final Result      Mildly displaced dorsal triquetral fracture.      Nondisplaced distal radius fracture.         Electronically signed by: Jose Antonio Mak MD   Date:    09/23/2023   Time:    11:45      X-Ray Ankle Complete Right   Final Result      Fractured calcaneus.         Electronically signed by: Desmond Villa   Date:    09/23/2023   Time:    11:45         I have reviewed all pertinent imaging results/findings within the past 24 hours.    Micro/Path/Other:  Microbiology Results (last 7 days)       ** No results found for the last 168 hours. **           Specimen (168h ago, onward)       Start     Ordered    09/29/23 1655  Specimen to Pathology  RELEASE UPON ORDERING        References:    Click here for ordering Quick Tip   Question:  Release to patient  Answer:  Immediate    09/29/23 1655                     Problems list:  Active Problem List with Overview Notes    Diagnosis Date Noted    Closed nondisplaced fracture of right calcaneus 09/24/2023    Closed fracture of right femur 09/24/2023    Epidural hematoma 09/24/2023    Spinal stenosis of lumbar region 09/24/2023    Closed fracture of third lumbar vertebra 09/23/2023    Radius fracture 09/23/2023    Closed right hip fracture 09/23/2023    Displaced fracture of body of right talus, initial encounter for closed fracture 09/23/2023        Assessment & Plan:   47 y.o. male admitted on 9/23/2023 following  fell or slept wall fall from a balcony of approximally 12th and 13th feet.  He was found to have right hip fracture, burst fracture of L3 with  retropulsion, central cord stenosis based on CT re, 13 mm lytic lesion with sclerotic rim and thinned zone of transition in the left iliac bone which is a incidental finding, comminuted fracture of the calcaneus with minimally displaced fracture of the talus.      Consults:   Neurosurgery  Orthopedic surgery   Therapy:  Physical Therapy  Occupational Therapy Weight bearing status:   Will verify WB status with Ortho  Precautions:  Fall and Spinal   Seizure prophylaxis:  Not indicated. VTE prophylaxis:     Prophylactic Lovenox 40mg BID GI prophylaxis:  H2B   Outpatient follow up:  Orthopedic surgery  Neurosurgery  Disposition:  Pending progress with therapy      - Pull NGT, start CLD  - PT/OT; dispo pending progress with therapy  - PO and IV pain meds  - Stop IVF  - transition to PO PPI daily  - Lovenox for DVT prophylaxis    Gary Leal MD  General Surgery [facility]

## 2023-10-02 NOTE — PROGRESS NOTES
POD#5 s/p L1 to L5 posterolateral fusion with L2-3 laminectomy for a L3 burst fracture  S/p Right JESSIE  He is sitting up in bed, NAD  His back pain is currently controlled. He is sitting up and transferring in the bed much better than previous days. TLSO in place.  He continues with NG tube, multiple BMs overnight and today  He denies numbness and tingling in bilateral LE    AFVSS  PERRL, EOMI  Awake and conversant  Motor exam is limited by right upper extremity in splint  Limited hip flexion d/t hip precautions, dressing intact. 4/5 knee ext on the right. 5/5 DF/PF/EHL  Strength in left upper and lower extremities is 5/5  Normal sensation to light touch x 4 extremities  Dressing- C/D/I  Drain site dry    Plan:   Continue daily dressing changes  TLSO when HOB> 30 degrees  PT/OT  SCDs and lovenox for DVT prophylaxis  Rehab placement pending

## 2023-10-02 NOTE — NURSING
Nurses Note -- 4 Eyes      10/2/2023   5:39 PM      Skin assessed during: Admit      [x] No Altered Skin Integrity Present    []Prevention Measures Documented      [] Yes- Altered Skin Integrity Present or Discovered   [] LDA Added if Not in Epic (Describe Wound)   [] New Altered Skin Integrity was Present on Admit and Documented in LDA   [] Wound Image Taken    Wound Care Consulted? No    Attending Nurse:  Jian Owen LPN     Second RN/Staff Member:   Brent South RN

## 2023-10-02 NOTE — NURSING
Nurses Note -- 4 Eyes      10/2/2023   3:53 AM      Skin assessed during: Q Shift Change      [] No Altered Skin Integrity Present    [x]Prevention Measures Documented      [x] Yes- Altered Skin Integrity Present or Discovered   [] LDA Added if Not in Epic (Describe Wound)   [] New Altered Skin Integrity was Present on Admit and Documented in LDA   [] Wound Image Taken    Wound Care Consulted? No    Attending Nurse:  Gary Fong RN/Staff Member:   SANTOS Alvarado           Render If Medication Purchased By Clinic In Visit Note?: Yes

## 2023-10-02 NOTE — ANESTHESIA POSTPROCEDURE EVALUATION
Anesthesia Post Evaluation    Patient: José Henry    Procedure(s) Performed: Procedure(s) (LRB):  FUSION, SPINE, LUMBAR, PLIF, USING COMPUTER-ASSISTED NAVIGATION (N/A)    Final Anesthesia Type: general      Patient location during evaluation: PACU  Patient participation: Yes- Able to Participate  Level of consciousness: awake and alert  Post-procedure vital signs: reviewed and stable  Pain management: adequate  Airway patency: patent    PONV status at discharge: No PONV  Anesthetic complications: no      Respiratory status: unassisted  Hydration status: euvolemic  Follow-up not needed.          Vitals Value Taken Time   /89 10/02/23 0401   Temp 36.8 °C (98.3 °F) 10/02/23 0310   Pulse 95 10/02/23 0703   Resp 23 10/02/23 0703   SpO2 95 % 10/02/23 0703   Vitals shown include unvalidated device data.      Event Time   Out of Recovery 09/27/2023 15:32:00         Pain/Luis E Score: Pain Rating Prior to Med Admin: 7 (10/1/2023  9:35 AM)  Pain Rating Post Med Admin: 0 (10/1/2023  5:01 AM)

## 2023-10-03 ENCOUNTER — TELEPHONE (OUTPATIENT)
Dept: NEUROSURGERY | Facility: CLINIC | Age: 48
End: 2023-10-03
Payer: MEDICAID

## 2023-10-03 PROBLEM — K56.7 ILEUS: Status: ACTIVE | Noted: 2023-10-03

## 2023-10-03 LAB
ABS NEUT (OLG): 13.2 X10(3)/MCL (ref 2.1–9.2)
ALBUMIN SERPL-MCNC: 2.6 G/DL (ref 3.5–5)
ALBUMIN/GLOB SERPL: 1 RATIO (ref 1.1–2)
ALP SERPL-CCNC: 53 UNIT/L (ref 40–150)
ALT SERPL-CCNC: 21 UNIT/L (ref 0–55)
APPEARANCE UR: CLEAR
AST SERPL-CCNC: 26 UNIT/L (ref 5–34)
BACTERIA #/AREA URNS AUTO: ABNORMAL /HPF
BILIRUB SERPL-MCNC: 0.6 MG/DL
BILIRUB UR QL STRIP.AUTO: NEGATIVE
BUN SERPL-MCNC: 12.9 MG/DL (ref 8.9–20.6)
CALCIUM SERPL-MCNC: 8.1 MG/DL (ref 8.4–10.2)
CHLORIDE SERPL-SCNC: 100 MMOL/L (ref 98–107)
CO2 SERPL-SCNC: 26 MMOL/L (ref 22–29)
COLOR UR AUTO: ABNORMAL
CREAT SERPL-MCNC: 0.63 MG/DL (ref 0.73–1.18)
EOSINOPHIL NFR BLD MANUAL: 0.19 X10(3)/MCL (ref 0–0.9)
EOSINOPHIL NFR BLD MANUAL: 1 %
ERYTHROCYTE [DISTWIDTH] IN BLOOD BY AUTOMATED COUNT: 13.9 % (ref 11.5–17)
GFR SERPLBLD CREATININE-BSD FMLA CKD-EPI: >60 MLS/MIN/1.73/M2
GLOBULIN SER-MCNC: 2.7 GM/DL (ref 2.4–3.5)
GLUCOSE SERPL-MCNC: 87 MG/DL (ref 74–100)
GLUCOSE UR QL STRIP.AUTO: NEGATIVE
HCT VFR BLD AUTO: 25.1 % (ref 42–52)
HGB BLD-MCNC: 8.6 G/DL (ref 14–18)
INSTRUMENT WBC (OLG): 19.13 X10(3)/MCL
KETONES UR QL STRIP.AUTO: ABNORMAL
LEUKOCYTE ESTERASE UR QL STRIP.AUTO: NEGATIVE
LYMPHOCYTES NFR BLD MANUAL: 11 %
LYMPHOCYTES NFR BLD MANUAL: 2.1 X10(3)/MCL
MAGNESIUM SERPL-MCNC: 1.7 MG/DL (ref 1.6–2.6)
MCH RBC QN AUTO: 29.8 PG (ref 27–31)
MCHC RBC AUTO-ENTMCNC: 34.3 G/DL (ref 33–36)
MCV RBC AUTO: 86.9 FL (ref 80–94)
METAMYELOCYTES NFR BLD MANUAL: 1 %
MONOCYTES NFR BLD MANUAL: 14 %
MONOCYTES NFR BLD MANUAL: 2.68 X10(3)/MCL (ref 0.1–1.3)
MUCOUS THREADS URNS QL MICRO: ABNORMAL /LPF
MYELOCYTES NFR BLD MANUAL: 5 %
NEUTROPHILS NFR BLD MANUAL: 69 %
NITRITE UR QL STRIP.AUTO: NEGATIVE
NRBC BLD AUTO-RTO: 0 %
NRBC BLD MANUAL-RTO: 1 %
PH UR STRIP.AUTO: 8 [PH]
PHOSPHATE SERPL-MCNC: 3.2 MG/DL (ref 2.3–4.7)
PLATELET # BLD AUTO: 546 X10(3)/MCL (ref 130–400)
PLATELET # BLD EST: ABNORMAL 10*3/UL
PMV BLD AUTO: 9.9 FL (ref 7.4–10.4)
POIKILOCYTOSIS BLD QL SMEAR: ABNORMAL
POTASSIUM SERPL-SCNC: 2.9 MMOL/L (ref 3.5–5.1)
PROT SERPL-MCNC: 5.3 GM/DL (ref 6.4–8.3)
PROT UR QL STRIP.AUTO: ABNORMAL
RBC # BLD AUTO: 2.89 X10(6)/MCL (ref 4.7–6.1)
RBC #/AREA URNS AUTO: ABNORMAL /HPF
RBC MORPH BLD: ABNORMAL
RBC UR QL AUTO: NEGATIVE
SODIUM SERPL-SCNC: 140 MMOL/L (ref 136–145)
SP GR UR STRIP.AUTO: 1.02 (ref 1–1.03)
SQUAMOUS #/AREA URNS AUTO: ABNORMAL /HPF
TARGETS BLD QL SMEAR: ABNORMAL
UROBILINOGEN UR STRIP-ACNC: 1
WBC # SPEC AUTO: 19.13 X10(3)/MCL (ref 4.5–11.5)
WBC #/AREA URNS AUTO: ABNORMAL /HPF

## 2023-10-03 PROCEDURE — 63600175 PHARM REV CODE 636 W HCPCS: Performed by: SURGERY

## 2023-10-03 PROCEDURE — 11000001 HC ACUTE MED/SURG PRIVATE ROOM

## 2023-10-03 PROCEDURE — 25000003 PHARM REV CODE 250: Performed by: STUDENT IN AN ORGANIZED HEALTH CARE EDUCATION/TRAINING PROGRAM

## 2023-10-03 PROCEDURE — 87040 BLOOD CULTURE FOR BACTERIA: CPT

## 2023-10-03 PROCEDURE — 25000003 PHARM REV CODE 250: Performed by: NURSE PRACTITIONER

## 2023-10-03 PROCEDURE — 84100 ASSAY OF PHOSPHORUS: CPT | Performed by: NURSE PRACTITIONER

## 2023-10-03 PROCEDURE — 97110 THERAPEUTIC EXERCISES: CPT | Mod: CQ

## 2023-10-03 PROCEDURE — 63600175 PHARM REV CODE 636 W HCPCS

## 2023-10-03 PROCEDURE — 83735 ASSAY OF MAGNESIUM: CPT | Performed by: NURSE PRACTITIONER

## 2023-10-03 PROCEDURE — 80053 COMPREHEN METABOLIC PANEL: CPT | Performed by: NURSE PRACTITIONER

## 2023-10-03 PROCEDURE — 85027 COMPLETE CBC AUTOMATED: CPT | Performed by: NURSE PRACTITIONER

## 2023-10-03 PROCEDURE — 63600175 PHARM REV CODE 636 W HCPCS: Performed by: NURSE PRACTITIONER

## 2023-10-03 PROCEDURE — 97116 GAIT TRAINING THERAPY: CPT | Mod: CQ

## 2023-10-03 PROCEDURE — 81001 URINALYSIS AUTO W/SCOPE: CPT

## 2023-10-03 PROCEDURE — 21400001 HC TELEMETRY ROOM

## 2023-10-03 RX ORDER — GABAPENTIN 300 MG/1
300 CAPSULE ORAL 3 TIMES DAILY
Status: DISCONTINUED | OUTPATIENT
Start: 2023-10-03 | End: 2023-10-16 | Stop reason: HOSPADM

## 2023-10-03 RX ORDER — TALC
6 POWDER (GRAM) TOPICAL NIGHTLY PRN
Status: DISCONTINUED | OUTPATIENT
Start: 2023-10-03 | End: 2023-10-16 | Stop reason: HOSPADM

## 2023-10-03 RX ORDER — OXYCODONE HYDROCHLORIDE 10 MG/1
10 TABLET ORAL EVERY 4 HOURS PRN
Status: DISCONTINUED | OUTPATIENT
Start: 2023-10-03 | End: 2023-10-16 | Stop reason: HOSPADM

## 2023-10-03 RX ORDER — FOLIC ACID 1 MG/1
1 TABLET ORAL DAILY
Status: DISCONTINUED | OUTPATIENT
Start: 2023-10-04 | End: 2023-10-16 | Stop reason: HOSPADM

## 2023-10-03 RX ORDER — POTASSIUM CHLORIDE 14.9 MG/ML
20 INJECTION INTRAVENOUS
Status: DISPENSED | OUTPATIENT
Start: 2023-10-03 | End: 2023-10-03

## 2023-10-03 RX ORDER — POLYETHYLENE GLYCOL 3350 17 G/17G
17 POWDER, FOR SOLUTION ORAL 2 TIMES DAILY
Status: DISCONTINUED | OUTPATIENT
Start: 2023-10-03 | End: 2023-10-16 | Stop reason: HOSPADM

## 2023-10-03 RX ORDER — FERROUS SULFATE, DRIED 160(50) MG
1 TABLET, EXTENDED RELEASE ORAL 2 TIMES DAILY
Status: DISCONTINUED | OUTPATIENT
Start: 2023-10-03 | End: 2023-10-16 | Stop reason: HOSPADM

## 2023-10-03 RX ORDER — LORAZEPAM 0.5 MG/1
1 TABLET ORAL EVERY 4 HOURS PRN
Status: DISCONTINUED | OUTPATIENT
Start: 2023-10-03 | End: 2023-10-04

## 2023-10-03 RX ORDER — POTASSIUM CHLORIDE 7.45 MG/ML
40 INJECTION INTRAVENOUS ONCE
Status: DISCONTINUED | OUTPATIENT
Start: 2023-10-03 | End: 2023-10-03

## 2023-10-03 RX ORDER — DOCUSATE SODIUM 50 MG/5ML
100 LIQUID ORAL 2 TIMES DAILY
Status: DISCONTINUED | OUTPATIENT
Start: 2023-10-03 | End: 2023-10-15

## 2023-10-03 RX ORDER — THIAMINE HCL 100 MG
100 TABLET ORAL DAILY
Status: DISCONTINUED | OUTPATIENT
Start: 2023-10-04 | End: 2023-10-16 | Stop reason: HOSPADM

## 2023-10-03 RX ORDER — PANTOPRAZOLE SODIUM 40 MG/1
40 TABLET, DELAYED RELEASE ORAL DAILY
Status: DISCONTINUED | OUTPATIENT
Start: 2023-10-03 | End: 2023-10-16 | Stop reason: HOSPADM

## 2023-10-03 RX ADMIN — POLYETHYLENE GLYCOL 3350 17 G: 17 POWDER, FOR SOLUTION ORAL at 08:10

## 2023-10-03 RX ADMIN — GABAPENTIN 300 MG: 300 CAPSULE ORAL at 12:10

## 2023-10-03 RX ADMIN — Medication 6 MG: at 08:10

## 2023-10-03 RX ADMIN — ENOXAPARIN SODIUM 40 MG: 40 INJECTION SUBCUTANEOUS at 08:10

## 2023-10-03 RX ADMIN — THIAMINE HCL TAB 100 MG 100 MG: 100 TAB at 08:10

## 2023-10-03 RX ADMIN — PANTOPRAZOLE SODIUM 40 MG: 40 TABLET, DELAYED RELEASE ORAL at 08:10

## 2023-10-03 RX ADMIN — OXYCODONE HYDROCHLORIDE 10 MG: 10 TABLET ORAL at 05:10

## 2023-10-03 RX ADMIN — POTASSIUM CHLORIDE 20 MEQ: 14.9 INJECTION, SOLUTION INTRAVENOUS at 06:10

## 2023-10-03 RX ADMIN — SODIUM CHLORIDE, POTASSIUM CHLORIDE, SODIUM LACTATE AND CALCIUM CHLORIDE: 600; 310; 30; 20 INJECTION, SOLUTION INTRAVENOUS at 12:10

## 2023-10-03 RX ADMIN — GABAPENTIN 300 MG: 300 CAPSULE ORAL at 08:10

## 2023-10-03 RX ADMIN — FOLIC ACID 1 MG: 1 TABLET ORAL at 08:10

## 2023-10-03 RX ADMIN — DOCUSATE SODIUM 100 MG: 50 LIQUID ORAL at 08:10

## 2023-10-03 RX ADMIN — OXYCODONE HYDROCHLORIDE 10 MG: 10 TABLET ORAL at 12:10

## 2023-10-03 RX ADMIN — Medication 1 TABLET: at 08:10

## 2023-10-03 RX ADMIN — OXYCODONE HYDROCHLORIDE 10 MG: 10 TABLET ORAL at 09:10

## 2023-10-03 RX ADMIN — SODIUM CHLORIDE, POTASSIUM CHLORIDE, SODIUM LACTATE AND CALCIUM CHLORIDE: 600; 310; 30; 20 INJECTION, SOLUTION INTRAVENOUS at 01:10

## 2023-10-03 RX ADMIN — THERA TABS 1 TABLET: TAB at 08:10

## 2023-10-03 NOTE — PT/OT/SLP PROGRESS
Physical Therapy Treatment    Patient Name:  José Henry   MRN:  [id number]    Recommendations:     Discharge Recommendations: rehabilitation facility, home, home with home health, home health PT (pending progress with therapy)  Discharge Equipment Recommendations: platform, walker, rolling, bath bench, bedside commode  Barriers to discharge: Impaired mobility    Assessment:     José Henry is a 47 y.o. male admitted with a medical diagnosis of <principal problem not specified>.  He presents with the following impairments/functional limitations: weakness, impaired endurance, impaired self care skills, impaired functional mobility, gait instability, impaired balance .    Rehab Prognosis: Good; patient would benefit from acute skilled PT services to address these deficits and reach maximum level of function.    Recent Surgery: Procedure(s) (LRB):  ARTHROPLASTY, HIP (Right) 4 Days Post-Op    Plan:     During this hospitalization, patient to be seen 6 x/week to address the identified rehab impairments via gait training, therapeutic activities, therapeutic exercises and progress toward the following goals:    Plan of Care Expires:  11/01/23    Subjective     Chief Complaint: none   Patient/Family Comments/goals:   Pain/Comfort:  Pain Rating 1: 0/10      Objective:     Communicated with RN prior to session.  Patient found HOB elevated with peripheral IV, telemetry upon PT entry to room.     General Precautions: Standard, fall  Orthopedic Precautions: spinal precautions, RLE posterior precautions, RLE non weight bearing, RUE non weight bearing (can WB thru R elbow)  Braces: TLSO (when HOB>30 deg)  Respiratory Status: Room air  Blood Pressure:   Skin Integrity: Visible skin intact      Functional Mobility:  Bed Mobility:     Supine to Sit: minimum assistance  Transfers:     Sit to Stand:  moderate assistance and of 2 persons with platform walker  Gait: Pt amb ~30ft x 2 trials using pfw, Kassandra. Required frequent standing  r/b. Demo'd hop to gait pattern.     Therapeutic Activities/Exercises:  Performed and educated pt on importance of performing therx while sitting UIC.   Performed LLE: hip flexion, LAQ, AP, SLR, heel slides + RLE: LAQ  Minimal reiteration of RUE and RLE WB precautions during mobility. Good follow through.   Requested pt to sit UIC for at most 2 hours. OT or RN to put back to bed.     Education:  Patient and parent/s were provided with verbal education education regarding PT poc, d/c recs, fall prevention, safety with mobility, and all precautions.  Understanding was verbalized.     Patient left up in chair with all lines intact, call button in reach, and RN notified..    GOALS:   Multidisciplinary Problems       Physical Therapy Goals          Problem: Physical Therapy    Goal Priority Disciplines Outcome Goal Variances Interventions   Physical Therapy Goal     PT, PT/OT Ongoing, Progressing     Description: Goals to be met by: 2023     Patient will increase functional independence with mobility by performin. Supine to sit with Modified [place]  2. Sit to stand transfer with Modified [place]  3. Gait  x 150 feet with Modified [place] using platform RW.                          Time Tracking:     PT Received On:    PT Start Time: 1348     PT Stop Time: 1411  PT Total Time (min): 23 min     Billable Minutes: Gait Training 15 and Therapeutic Exercise 8    Treatment Type: Treatment  PT/PTA: PTA     Number of PTA visits since last PT visit: 2     10/03/2023

## 2023-10-03 NOTE — PROGRESS NOTES
"   Trauma Surgery   Progress Note  Admit Date: 9/23/2023  HD#10  POD#4 Days Post-Op    Subjective:   Interval history:  NAEO.  AFVSS  BM have slowed down  UOP appropriate  NG clamped with minimal output  Patient endorses that he's hungry and wants to get up out of bed  WBC increased to 19 today    Home Meds:   Current Outpatient Medications   Medication Instructions    dextroamphetamine-amphetamine 30 mg Tab 1 tablet, Oral, Daily    pantoprazole (PROTONIX) 40 MG tablet 1 tablet, Oral, Daily      Scheduled Meds:   calcium-vitamin D3  1 tablet Per G Tube BID    enoxparin  40 mg Subcutaneous Q12H (prophylaxis, 0900/2100)    folic acid  1 mg Per G Tube Daily    gabapentin  300 mg Per G Tube TID    multivitamin  1 tablet Per G Tube Daily    pantoprazole  40 mg Oral Daily    thiamine  100 mg Per G Tube Daily     Continuous Infusions:   lactated ringers 125 mL/hr at 10/03/23 0316     PRN Meds:heparin, porcine (PF), HYDROmorphone, LORazepam, melatonin, metoclopramide HCl, midazolam, ondansetron, oxyCODONE, promethazine     Objective:     VITAL SIGNS: 24 HR MIN & MAX LAST   Temp  Min: 98 °F (36.7 °C)  Max: 98.9 °F (37.2 °C)  98 °F (36.7 °C)   BP  Min: 123/81  Max: 138/87  127/82    Pulse  Min: 92  Max: 103  92    Resp  Min: 16  Max: 20  18    SpO2  Min: 94 %  Max: 99 %  95 %      HT: 6' 2.02" (188 cm)  WT: 93.9 kg (207 lb)  BMI: 26.6     Intake/output:  Intake/Output - Last 3 Shifts         10/01 0700  10/02 0659 10/02 0700  10/03 0659 10/03 0700  10/04 0659    I.V. (mL/kg) 2747.2 (29.3) 2203.1 (23.5)     Total Intake(mL/kg) 2747.2 (29.3) 2203.1 (23.5)     Urine (mL/kg/hr) 1700 (0.8) 650 (0.3)     Drains 150 1800     Stool 0 0     Total Output 1850 2450     Net +897.2 -246.9            Urine Occurrence  1 x     Stool Occurrence 6 x 3 x             Intake/Output Summary (Last 24 hours) at 10/3/2023 1054  Last data filed at 10/3/2023 0316  Gross per 24 hour   Intake 1738.16 ml   Output 2250 ml   Net -511.84 ml     "     Lines/drains/airway:       Peripheral IV - Single Lumen 09/26/23 2200 Anterior;Left Forearm (Active)   Site Assessment Clean;Dry;Intact;No redness;No swelling;No warmth;No drainage 10/01/23 0400   Extremity Assessment Distal to IV No abnormal discoloration 10/01/23 0400   Line Status Infusing 10/01/23 0400   Dressing Status Clean;Dry;Intact 10/01/23 0400   Dressing Intervention Integrity maintained 10/01/23 0400   Number of days: 4            Peripheral IV - Single Lumen 09/27/23 0728 18 G Left Hand (Active)   Site Assessment Clean;Dry;Intact;No redness;No swelling;No warmth;No drainage 10/01/23 0400   Extremity Assessment Distal to IV No abnormal discoloration 10/01/23 0400   Line Status Saline locked 10/01/23 0400   Dressing Status Clean;Dry;Intact 10/01/23 0400   Dressing Intervention Integrity maintained 10/01/23 0400   Number of days: 4            NG/OG Tube 09/27/23 1730 nasogastric Left nostril (Active)   Placement Check placement verified by aspirate characteristics 10/01/23 0400   Distal Tube Length (cm) 53 10/01/23 0000   Tolerance no signs/symptoms of discomfort 10/01/23 0400   Securement secured to nostril center w/ adhesive device 10/01/23 0400   Clamp Status/Tolerance unclamped;no emesis;no nausea;no restlessness 10/01/23 0400   Suction Setting/Drainage Method suction at;low;intermittent setting 10/01/23 0400   Insertion Site Appearance no redness, warmth, tenderness, skin breakdown, drainage 10/01/23 0400   Drainage Bile;Green 10/01/23 0400   Flush/Irrigation flushed w/;water;no resistance met 09/29/23 2000   Tube Output(mL)(Include Discarded Residual) 250 mL 10/01/23 0528   Number of days: 3       Male External Urinary Catheter 09/30/23 0500 (Active)   Collection Container Urimeter 10/01/23 0400   Securement Method secured to top of thigh w/ adhesive device 10/01/23 0400   Skin no redness;no breakdown;penis/scrotum cleansed w/ soap and water 10/01/23 0400   Tolerance no signs/symptoms of  "discomfort;assisted with appliance change 10/01/23 0400   Output (mL) 100 mL 10/01/23 0528   Catheter Change Date 09/30/23 10/01/23 0400   Catheter Change Time 0430 10/01/23 0400   Number of days: 1       Physical examination:  Gen: NAD, AAOx3, answering questions appropriately  HEENT: normocephalic, atraumatic, EOMI, PERRL, NGT clamped  CV: RR  Resp: NWOB  Abd: S/distended, nontender  Msk: moving all extremities spontaneously and purposefully- reduced to RUE/RLE  Neuro: CN II-XII grossly intact GCS 15  Skin/wounds: warm dry, dressings to RUE RLE    Labs:  Renal:  Recent Labs     10/01/23  0134 10/02/23  0116 10/03/23  0420   BUN 12.9 15.2 12.9   CREATININE 0.67* 0.65* 0.63*     No results for input(s): "LACTIC" in the last 72 hours.  FEN/GI:  Recent Labs     10/01/23  0134 10/02/23  0116 10/03/23  0420    140 140   K 3.4* 3.3* 2.9*   CO2 28 25 26   CALCIUM 8.4 8.1* 8.1*   MG 2.10 1.80 1.70   PHOS 3.9 3.7 3.2   ALBUMIN 3.0* 2.7* 2.6*   BILITOT 0.5 0.5 0.6   AST 36* 30 26   ALKPHOS 49 55 53   ALT 24 22 21     Heme:  Recent Labs     10/01/23  0134 10/02/23  0116 10/03/23  0420   HGB 9.9* 9.1* 8.6*   HCT 29.1* 26.1* 25.1*   * 435* 546*     ID:  Recent Labs     10/02/23  0116 10/03/23  0420   WBC 17.59  17.59* 19.13  19.13*     CBG:  Recent Labs     10/01/23  0134 10/02/23  0116 10/03/23  0420   GLUCOSE 108* 89 87      No results for input(s): "POCTGLUCOSE" in the last 72 hours.   Cardiovascular:  No results for input(s): "TROPONINI", "CKTOTAL", "CKMB", "BNP" in the last 168 hours.  I have reviewed all pertinent lab results within the past 24 hours.    Imaging:  CT Abdomen Pelvis With Contrast   Final Result      1. Moderate dilatation of small-bowel loops with gradual transition to normal caliber small bowel distally.  Favor ileus over obstruction.   2. Fluid-filled colon which could be seen with a mild colitis or other diarrheal illness.   3. Enteric tube tip just past the GE junction.  Suggest " advancing at least 5 cm if possible.   4. Other findings above.         Electronically signed by: Ritchie Coker   Date:    10/02/2023   Time:    13:04      X-Ray Abdomen AP 1 View   Final Result      As above.         Electronically signed by: Stevo Thorne   Date:    10/01/2023   Time:    09:36      X-Ray Hip 1 View Right (with Pelvis when performed)   Final Result      Expected postoperative changes.         Electronically signed by: Stevo Thorne   Date:    09/29/2023   Time:    18:23      X-Ray Chest 1 View   Final Result      XR Gastric tube check, non-radiologist performed   Final Result      Nasogastric tube terminates within the gastric fundus.         Electronically signed by: Desmond Villa   Date:    09/27/2023   Time:    17:51      SURG FL Surgery Fluoro Usage   Final Result      X-Ray Abdomen AP 1 View   Final Result      Progressive gaseous distension of the bowel.  Consider ileus versus distal obstruction.         Electronically signed by: Kathie Atwood   Date:    09/27/2023   Time:    06:42      MRI Lumbar Spine Without Contrast   Final Result      1. L3 vertebral body superior half acute compression deformity with paraspinal soft inflammations.  Combination retropulsed bony fragment and ventral epidural hematoma results in central canal stenosis.      2. Lumbar degenerative disc disease and spondylosis level by level discussed above.         Electronically signed by: Desmond Villa   Date:    09/23/2023   Time:    15:40      MRI Thoracic Spine Without Contrast   Final Result      No evidence for acute osseous finding or static listhesis      No significant herniation or bulge.  No canal, cord, or foraminal compromise      Mild thoracic spondylosis.         Electronically signed by: Jose Antonio Mak MD   Date:    09/23/2023   Time:    15:33      CT Ankle (Including Hindfoot) Without Contrast Right   Final Result      Calcaneus fracture as described above.         Electronically signed by: Jose Antonio  MD Obdulio   Date:    09/23/2023   Time:    14:21      X-Ray Hand 3 view Left   Final Result      No acute osseous abnormality identified.         Electronically signed by: Desmond Villa   Date:    09/23/2023   Time:    13:53      X-Ray Knee Complete 4 or More Views Left   Final Result      No acute osseous abnormality identified.         Electronically signed by: Desmond Villa   Date:    09/23/2023   Time:    13:51      Xray Previous   Final Result      Xray Previous   Final Result      Xray Previous   Final Result      CT Previous   Final Result      CT Previous   Final Result      CT Previous   Final Result      CT Previous   Final Result      X-Ray Pelvis Routine AP   Final Result      Right subcapital femoral neck fracture.         Electronically signed by: Jose Antonio Mak MD   Date:    09/23/2023   Time:    12:31      X-Ray Femur Ap/Lat Right   Final Result      Right subcapital femoral neck fracture.         Electronically signed by: Jose Antonio Mak MD   Date:    09/23/2023   Time:    12:27      X-Ray Wrist Complete Right   Final Result      Mildly displaced dorsal triquetral fracture.      Nondisplaced distal radius fracture.         Electronically signed by: Jose Antonio Mak MD   Date:    09/23/2023   Time:    11:45      X-Ray Ankle Complete Right   Final Result      Fractured calcaneus.         Electronically signed by: Desmond Villa   Date:    09/23/2023   Time:    11:45         I have reviewed all pertinent imaging results/findings within the past 24 hours.    Micro/Path/Other:  Microbiology Results (last 7 days)       Procedure Component Value Units Date/Time    Blood Culture [[id number]] Collected: 10/03/23 0824    Order Status: Resulted Specimen: Blood Updated: 10/03/23 0830           Specimen (168h ago, onward)       Start     Ordered    09/29/23 1655  Specimen to Pathology  RELEASE UPON ORDERING        References:    Click here for ordering Quick Tip   Question:  Release to patient  Answer:  Immediate     09/29/23 1655                     Problems list:  Active Problem List with Overview Notes    Diagnosis Date Noted    Closed nondisplaced fracture of right calcaneus 09/24/2023    Closed fracture of right femur 09/24/2023    Epidural hematoma 09/24/2023    Spinal stenosis of lumbar region 09/24/2023    Closed fracture of third lumbar vertebra 09/23/2023    Radius fracture 09/23/2023    Closed right hip fracture 09/23/2023    Displaced fracture of body of right talus, initial encounter for closed fracture 09/23/2023        Assessment & Plan:   47 y.o. male admitted on 9/23/2023 following  fell or slept wall fall from a balcony of approximally 12th and 13th feet.  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      Consults:   Neurosurgery  Orthopedic surgery   Therapy:  Physical Therapy  Occupational Therapy Weight bearing status:   Will verify WB status with Ortho  Precautions:  Fall and Spinal   Seizure prophylaxis:  Not indicated. VTE prophylaxis:     Prophylactic Lovenox 40mg BID GI prophylaxis:  H2B   Outpatient follow up:  Orthopedic surgery  Neurosurgery  Disposition:  Pending progress with therapy      - NGT clamped, start CLD  -Leukocytosis today, Bcx, UA/Ucx pending; likely 2/2 colitis seen on CT abdomen  -Hold abx until cultures result  - PT/OT; dispo pending progress with therapy  - PO and IV pain meds  - Stop IVF  - transition to PO PPI daily  - Lovenox for DVT prophylaxis    Que Pérez MD  General Surgery

## 2023-10-03 NOTE — PLAN OF CARE
Spoke to pt and his [facility] [phone].  She was asking about medicaid application.  She states she has been trying to call [place] financial counselor /[facility] [phone] but all of her calls go to .  Pt's PCP is DR. Welsh in [place].  Pt is unemployed. He uses [facility] in [place].

## 2023-10-03 NOTE — PT/OT/SLP PROGRESS
Occupational Therapy      Patient Name:  José Henry   MRN:  [id number]    Patient not seen today secondary to working with PT. Will follow-up as schedule permits.    10/3/2023

## 2023-10-03 NOTE — TELEPHONE ENCOUNTER
The pt is currently still in hospital. Not sure when pt will be d/c. Per Dr. Diaz's note pt needs 6 wk PO w/ Lumbar XR 2-3 vw in [place] brace w/ Nancy. His sx date is 9/27/23. Will follow for discharge disposition and then schedule accordingly.

## 2023-10-03 NOTE — PROGRESS NOTES
Inpatient Nutrition Assessment    Admit Date: 9/23/2023   Total duration of encounter: 10 days     Nutrition Recommendation/Prescription     Advance diet as tolerated to regular diet  Continue vitamin supplementation   Boost Breeze TID provide an additional 250 kcal and 9 g protein per container  Monitor diet advancement, po intake and weight   Consider starting PPN until tolerating oral diet: Clinimix 4.25/5% @ 90ml/hr (734 kcal, 92 gm protein)  Pt has had minimal nutrition x 5-6 days; monitor for re-feeding: K+, Mag, Phos levels daily    Communication of Recommendations: reviewed with nurse    Nutrition Assessment     Malnutrition Assessment/Nutrition-Focused Physical Exam    Malnutrition Context: acute illness or injury (09/29/23 1508)  Malnutrition Level:  (does not meet criteria) (09/29/23 1508)  Energy Intake (Malnutrition): less than or equal to 50% for greater than or equal to 5 days (09/29/23 1508)  Weight Loss (Malnutrition):  (does not meet criteria) (09/29/23 1508)  Subcutaneous Fat (Malnutrition):  (does not meet criteria) (09/29/23 1508)           Muscle Mass (Malnutrition):  (does not meet criteria) (09/29/23 1508)                                   A minimum of two characteristics is recommended for diagnosis of either severe or non-severe malnutrition.    Chart Review    Reason Seen: length of stay    Malnutrition Screening Tool Results   Have you recently lost weight without trying?: No  Have you been eating poorly because of a decreased appetite?: No   MST Score: 0   Diagnosis:  POD#2 s/p L1 to L5 posterolateral fusion with L2-3 laminectomy for a L3 burst fracture  Ileus vs distal obstruction  Slept walk off balcony resulting in R hip fx, L3 burst fx, central cord stenosis, calcaneus fx, displaced fx of talus    Relevant Medical History: ETOH use, smoker, GERD    Nutrition-Related Medications: lactated ringers, calcium-vitamin D3, Pepcid, folic acid, MCI, thiamine, suppository prn, zofran  "prn  Calorie Containing IV Medications: no significant kcals from medications at this time    Nutrition-Related Labs: : RBC-3.59, H/H-10.8/31.7, glu-103, sherif-8.0  10/3: K 2.9, Crea 0.63, Ca 8.1      Diet/PN Order: Diet clear liquid  Oral Supplement Order: Boost Breeze  Tube Feeding Order: none  Appetite/Oral Intake: good/25-50% of meals  Factors Affecting Nutritional Intake: clear liquid diet and constipation  Food/[employer]/Cultural Preferences: none reported  Food Allergies: none reported    Wound(s):       Last Bowel Movement: 10/02/23    Comments    : pt NPO x 2 days, NGT to suction for ileus. Pt to OR today for hip ORIF. Pt reports poor appetite since admission, with good appetite prior. No Gi issues reported.  lb with no unintentional weight loss prior admission.    10/3: pt started on clear liquid diet this morning, NG tube clamped, will add Boost Breeze; has been NPO or clear liquid diet x 5-6 days    Anthropometrics    Height: 6' 2.02" (188 cm) Height Method: Stated  Last Weight: 93.9 kg (207 lb) (23 1056) Weight Method: Bed Scale  BMI (Calculated): 26.6  BMI Classification: overweight (BMI 25-29.9)        Ideal Body Weight (IBW), Male: 190.12 lb     % Ideal Body Weight, Male (lb): 108.95 %                 Usual Body Weight (UBW), k.9 kg  % Usual Body Weight: 100.2     Usual Weight Provided By: patient and patient denies unintentional weight loss    Wt Readings from Last 5 Encounters:   23 93.9 kg (207 lb)   20 104.1 kg (229 lb 8 oz)     Weight Change(s) Since Admission:  Admit Weight: 93.9 kg (207 lb) (23 1056)      Estimated Needs    Weight Used For Calorie Calculations: 93.9 kg (207 lb 0.2 oz)  Energy Calorie Requirements (kcal): 0483-5454 kcal (1.1-1.2 stress factor  Energy Need Method: [facility]  Weight Used For Protein Calculations: 93.9 kg (207 lb 0.2 oz)  Protein Requirements:  g (1.0-1.2 g/kg)  Fluid Requirements (mL): 6913-9920 mL (1 " mL/kcal)  Temp (24hrs), Av.6 °F (37 °C), Min:98 °F (36.7 °C), Max:99 °F (37.2 °C)       Enteral Nutrition    Patient not receiving enteral nutrition at this time.    Parenteral Nutrition    Patient not receiving parenteral nutrition support at this time.    Evaluation of Received Nutrient Intake    Calories: not meeting estimated needs  Protein: not meeting estimated needs    Patient Education    Not applicable.    Nutrition Diagnosis     PES: Inadequate energy intake related to inability to consume sufficient nutrients as evidenced by <50% est energy needs met x 5-6 days. (continues)    Interventions/Goals     Intervention(s): general/healthful diet, commercial beverage, multivitamin/mineral supplement therapy, and collaboration with other providers  Goal: Meet greater than 75% of nutritional needs by follow-up. (goal progressing)    Monitoring & Evaluation     Dietitian will monitor energy intake, weight, electrolyte/renal panel, and glucose/endocrine profile.  Nutrition Risk/Follow-Up: high (follow-up in 1-4 days)   Please consult if re-assessment needed sooner.

## 2023-10-04 LAB
ABS NEUT (OLG): 9.59 X10(3)/MCL (ref 2.1–9.2)
ALBUMIN SERPL-MCNC: 2.5 G/DL (ref 3.5–5)
ALBUMIN/GLOB SERPL: 1 RATIO (ref 1.1–2)
ALP SERPL-CCNC: 54 UNIT/L (ref 40–150)
ALT SERPL-CCNC: 20 UNIT/L (ref 0–55)
AST SERPL-CCNC: 28 UNIT/L (ref 5–34)
BILIRUB SERPL-MCNC: 0.7 MG/DL
BUN SERPL-MCNC: 11 MG/DL (ref 8.9–20.6)
CALCIUM SERPL-MCNC: 7.9 MG/DL (ref 8.4–10.2)
CHLORIDE SERPL-SCNC: 98 MMOL/L (ref 98–107)
CO2 SERPL-SCNC: 26 MMOL/L (ref 22–29)
CREAT SERPL-MCNC: 0.61 MG/DL (ref 0.73–1.18)
EOSINOPHIL NFR BLD MANUAL: 0.6 X10(3)/MCL (ref 0–0.9)
EOSINOPHIL NFR BLD MANUAL: 4 %
ERYTHROCYTE [DISTWIDTH] IN BLOOD BY AUTOMATED COUNT: 13.8 % (ref 11.5–17)
GFR SERPLBLD CREATININE-BSD FMLA CKD-EPI: >60 MLS/MIN/1.73/M2
GLOBULIN SER-MCNC: 2.5 GM/DL (ref 2.4–3.5)
GLUCOSE SERPL-MCNC: 100 MG/DL (ref 74–100)
HCT VFR BLD AUTO: 24.1 % (ref 42–52)
HGB BLD-MCNC: 8.3 G/DL (ref 14–18)
INSTRUMENT WBC (OLG): 14.98 X10(3)/MCL
LYMPHOCYTES NFR BLD MANUAL: 16 %
LYMPHOCYTES NFR BLD MANUAL: 2.4 X10(3)/MCL
MAGNESIUM SERPL-MCNC: 1.6 MG/DL (ref 1.6–2.6)
MCH RBC QN AUTO: 29.6 PG (ref 27–31)
MCHC RBC AUTO-ENTMCNC: 34.4 G/DL (ref 33–36)
MCV RBC AUTO: 86.1 FL (ref 80–94)
MONOCYTES NFR BLD MANUAL: 14 %
MONOCYTES NFR BLD MANUAL: 2.1 X10(3)/MCL (ref 0.1–1.3)
MYELOCYTES NFR BLD MANUAL: 2 %
NEUTROPHILS NFR BLD MANUAL: 64 %
NRBC BLD AUTO-RTO: 0 %
PHOSPHATE SERPL-MCNC: 3.6 MG/DL (ref 2.3–4.7)
PLATELET # BLD AUTO: 495 X10(3)/MCL (ref 130–400)
PLATELET # BLD EST: ABNORMAL 10*3/UL
PMV BLD AUTO: 9.6 FL (ref 7.4–10.4)
POTASSIUM SERPL-SCNC: 2.9 MMOL/L (ref 3.5–5.1)
PROT SERPL-MCNC: 5 GM/DL (ref 6.4–8.3)
RBC # BLD AUTO: 2.8 X10(6)/MCL (ref 4.7–6.1)
RBC MORPH BLD: NORMAL
SODIUM SERPL-SCNC: 134 MMOL/L (ref 136–145)
WBC # SPEC AUTO: 14.98 X10(3)/MCL (ref 4.5–11.5)

## 2023-10-04 PROCEDURE — 97535 SELF CARE MNGMENT TRAINING: CPT | Mod: CO

## 2023-10-04 PROCEDURE — 97530 THERAPEUTIC ACTIVITIES: CPT | Mod: CQ

## 2023-10-04 PROCEDURE — 25000003 PHARM REV CODE 250: Performed by: NURSE PRACTITIONER

## 2023-10-04 PROCEDURE — 21400001 HC TELEMETRY ROOM

## 2023-10-04 PROCEDURE — 83735 ASSAY OF MAGNESIUM: CPT | Performed by: NURSE PRACTITIONER

## 2023-10-04 PROCEDURE — 80053 COMPREHEN METABOLIC PANEL: CPT | Performed by: NURSE PRACTITIONER

## 2023-10-04 PROCEDURE — 85027 COMPLETE CBC AUTOMATED: CPT | Performed by: NURSE PRACTITIONER

## 2023-10-04 PROCEDURE — 11000001 HC ACUTE MED/SURG PRIVATE ROOM

## 2023-10-04 PROCEDURE — 63600175 PHARM REV CODE 636 W HCPCS: Performed by: NURSE PRACTITIONER

## 2023-10-04 PROCEDURE — 63600175 PHARM REV CODE 636 W HCPCS

## 2023-10-04 PROCEDURE — 84100 ASSAY OF PHOSPHORUS: CPT | Performed by: NURSE PRACTITIONER

## 2023-10-04 PROCEDURE — 97116 GAIT TRAINING THERAPY: CPT | Mod: CQ

## 2023-10-04 RX ORDER — POTASSIUM CHLORIDE 14.9 MG/ML
80 INJECTION INTRAVENOUS ONCE
Status: COMPLETED | OUTPATIENT
Start: 2023-10-04 | End: 2023-10-04

## 2023-10-04 RX ADMIN — ENOXAPARIN SODIUM 40 MG: 40 INJECTION SUBCUTANEOUS at 09:10

## 2023-10-04 RX ADMIN — THERA TABS 1 TABLET: TAB at 09:10

## 2023-10-04 RX ADMIN — GABAPENTIN 300 MG: 300 CAPSULE ORAL at 08:10

## 2023-10-04 RX ADMIN — GABAPENTIN 300 MG: 300 CAPSULE ORAL at 02:10

## 2023-10-04 RX ADMIN — OXYCODONE HYDROCHLORIDE 10 MG: 10 TABLET ORAL at 02:10

## 2023-10-04 RX ADMIN — Medication 6 MG: at 08:10

## 2023-10-04 RX ADMIN — PANTOPRAZOLE SODIUM 40 MG: 40 TABLET, DELAYED RELEASE ORAL at 09:10

## 2023-10-04 RX ADMIN — POTASSIUM CHLORIDE 80 MEQ: 14.9 INJECTION, SOLUTION INTRAVENOUS at 11:10

## 2023-10-04 RX ADMIN — THIAMINE HCL TAB 100 MG 100 MG: 100 TAB at 09:10

## 2023-10-04 RX ADMIN — POLYETHYLENE GLYCOL 3350 17 G: 17 POWDER, FOR SOLUTION ORAL at 08:10

## 2023-10-04 RX ADMIN — OXYCODONE HYDROCHLORIDE 10 MG: 10 TABLET ORAL at 06:10

## 2023-10-04 RX ADMIN — DOCUSATE SODIUM 100 MG: 50 LIQUID ORAL at 08:10

## 2023-10-04 RX ADMIN — Medication 1 TABLET: at 08:10

## 2023-10-04 RX ADMIN — FOLIC ACID 1 MG: 1 TABLET ORAL at 09:10

## 2023-10-04 RX ADMIN — GABAPENTIN 300 MG: 300 CAPSULE ORAL at 09:10

## 2023-10-04 RX ADMIN — ENOXAPARIN SODIUM 40 MG: 40 INJECTION SUBCUTANEOUS at 08:10

## 2023-10-04 RX ADMIN — Medication 1 TABLET: at 09:10

## 2023-10-04 NOTE — PROGRESS NOTES
"   Trauma Surgery   Progress Note  Admit Date: 9/23/2023  HD#11  POD#5 Days Post-Op    Subjective:   Interval history:  NAEON  AFVSS  No BM yesterday  UOP appropriate  Tolerating CLD, NG out  WBC decreased to 14 from 19  PT recommending rehab vs. Home with HH    Home Meds:   Current Outpatient Medications   Medication Instructions    dextroamphetamine-amphetamine 30 mg Tab 1 tablet, Oral, Daily    pantoprazole (PROTONIX) 40 MG tablet 1 tablet, Oral, Daily      Scheduled Meds:   calcium-vitamin D3  1 tablet Oral BID    docusate  100 mg Oral BID    enoxparin  40 mg Subcutaneous Q12H (prophylaxis, 0900/2100)    folic acid  1 mg Oral Daily    gabapentin  300 mg Oral TID    multivitamin  1 tablet Oral Daily    pantoprazole  40 mg Oral Daily    polyethylene glycol  17 g Oral BID    thiamine  100 mg Oral Daily     Continuous Infusions:   lactated ringers 75 mL/hr at 10/04/23 0633     PRN Meds:heparin, porcine (PF), HYDROmorphone, melatonin, metoclopramide HCl, midazolam, ondansetron, oxyCODONE, promethazine     Objective:     VITAL SIGNS: 24 HR MIN & MAX LAST   Temp  Min: 97.5 °F (36.4 °C)  Max: 99 °F (37.2 °C)  97.5 °F (36.4 °C)   BP  Min: 115/79  Max: 136/89  124/83    Pulse  Min: 86  Max: 116  90    Resp  Min: 17  Max: 19  18    SpO2  Min: 95 %  Max: 96 %  96 %      HT: 6' 2.02" (188 cm)  WT: 93.9 kg (207 lb)  BMI: 26.6     Intake/output:  Intake/Output - Last 3 Shifts         10/02 0700  10/03 0659 10/03 0700  10/04 0659 10/04 0700  10/05 0659    I.V. (mL/kg) 2203.1 (23.5) 1913.5 (20.4)     IV Piggyback  87.5     Total Intake(mL/kg) 2203.1 (23.5) 2001 (21.3)     Urine (mL/kg/hr) 650 (0.3) 725 (0.3)     Drains 1800      Stool 0      Total Output 2450 725     Net -246.9 +1276            Urine Occurrence 1 x      Stool Occurrence 3 x              Intake/Output Summary (Last 24 hours) at 10/4/2023 1021  Last data filed at 10/4/2023 0633  Gross per 24 hour   Intake 2001.02 ml   Output 425 ml   Net 1576.02 ml     "     Lines/drains/airway:       Peripheral IV - Single Lumen 09/26/23 2200 Anterior;Left Forearm (Active)   Site Assessment Clean;Dry;Intact;No redness;No swelling;No warmth;No drainage 10/01/23 0400   Extremity Assessment Distal to IV No abnormal discoloration 10/01/23 0400   Line Status Infusing 10/01/23 0400   Dressing Status Clean;Dry;Intact 10/01/23 0400   Dressing Intervention Integrity maintained 10/01/23 0400   Number of days: 4            Peripheral IV - Single Lumen 09/27/23 0728 18 G Left Hand (Active)   Site Assessment Clean;Dry;Intact;No redness;No swelling;No warmth;No drainage 10/01/23 0400   Extremity Assessment Distal to IV No abnormal discoloration 10/01/23 0400   Line Status Saline locked 10/01/23 0400   Dressing Status Clean;Dry;Intact 10/01/23 0400   Dressing Intervention Integrity maintained 10/01/23 0400   Number of days: 4            NG/OG Tube 09/27/23 1730 nasogastric Left nostril (Active)   Placement Check placement verified by aspirate characteristics 10/01/23 0400   Distal Tube Length (cm) 53 10/01/23 0000   Tolerance no signs/symptoms of discomfort 10/01/23 0400   Securement secured to nostril center w/ adhesive device 10/01/23 0400   Clamp Status/Tolerance unclamped;no emesis;no nausea;no restlessness 10/01/23 0400   Suction Setting/Drainage Method suction at;low;intermittent setting 10/01/23 0400   Insertion Site Appearance no redness, warmth, tenderness, skin breakdown, drainage 10/01/23 0400   Drainage Bile;Green 10/01/23 0400   Flush/Irrigation flushed w/;water;no resistance met 09/29/23 2000   Tube Output(mL)(Include Discarded Residual) 250 mL 10/01/23 0528   Number of days: 3       Male External Urinary Catheter 09/30/23 0500 (Active)   Collection Container Urimeter 10/01/23 0400   Securement Method secured to top of thigh w/ adhesive device 10/01/23 0400   Skin no redness;no breakdown;penis/scrotum cleansed w/ soap and water 10/01/23 0400   Tolerance no signs/symptoms of  "discomfort;assisted with appliance change 10/01/23 0400   Output (mL) 100 mL 10/01/23 0528   Catheter Change Date 09/30/23 10/01/23 0400   Catheter Change Time 0430 10/01/23 0400   Number of days: 1       Physical examination:  Gen: NAD, AAOx3, answering questions appropriately  HEENT: normocephalic, atraumatic, EOMI, PERRL  CV: RR  Resp: NWOB  Abd: S/distended, nontender  Msk: moving all extremities spontaneously and purposefully- reduced to RUE/RLE  Neuro: CN II-XII grossly intact GCS 15  Skin/wounds: warm dry, dressings to RUE RLE    Labs:  Renal:  Recent Labs     10/02/23  0116 10/03/23  0420 10/04/23  0346   BUN 15.2 12.9 11.0   CREATININE 0.65* 0.63* 0.61*     No results for input(s): "LACTIC" in the last 72 hours.  FEN/GI:  Recent Labs     10/02/23  0116 10/03/23  0420 10/04/23  0346    140 134*   K 3.3* 2.9* 2.9*   CO2 25 26 26   CALCIUM 8.1* 8.1* 7.9*   MG 1.80 1.70 1.60   PHOS 3.7 3.2 3.6   ALBUMIN 2.7* 2.6* 2.5*   BILITOT 0.5 0.6 0.7   AST 30 26 28   ALKPHOS 55 53 54   ALT 22 21 20     Heme:  Recent Labs     10/02/23  0116 10/03/23  0420 10/04/23  0346   HGB 9.1* 8.6* 8.3*   HCT 26.1* 25.1* 24.1*   * 546* 495*     ID:  Recent Labs     10/03/23  0420 10/04/23  0346   WBC 19.13  19.13* 14.98  14.98*     CBG:  Recent Labs     10/02/23  0116 10/03/23  0420 10/04/23  0346   GLUCOSE 89 87 100      No results for input(s): "POCTGLUCOSE" in the last 72 hours.   Cardiovascular:  No results for input(s): "TROPONINI", "CKTOTAL", "CKMB", "BNP" in the last 168 hours.  I have reviewed all pertinent lab results within the past 24 hours.    Imaging:  CT Abdomen Pelvis With Contrast   Final Result      1. Moderate dilatation of small-bowel loops with gradual transition to normal caliber small bowel distally.  Favor ileus over obstruction.   2. Fluid-filled colon which could be seen with a mild colitis or other diarrheal illness.   3. Enteric tube tip just past the GE junction.  Suggest advancing at least 5 " cm if possible.   4. Other findings above.         Electronically signed by: Ritchie Coker   Date:    10/02/2023   Time:    13:04      X-Ray Abdomen AP 1 View   Final Result      As above.         Electronically signed by: Stevo Thorne   Date:    10/01/2023   Time:    09:36      X-Ray Hip 1 View Right (with Pelvis when performed)   Final Result      Expected postoperative changes.         Electronically signed by: Stevo Thorne   Date:    09/29/2023   Time:    18:23      X-Ray Chest 1 View   Final Result      XR Gastric tube check, non-radiologist performed   Final Result      Nasogastric tube terminates within the gastric fundus.         Electronically signed by: Desmond Villa   Date:    09/27/2023   Time:    17:51      SURG FL Surgery Fluoro Usage   Final Result      X-Ray Abdomen AP 1 View   Final Result      Progressive gaseous distension of the bowel.  Consider ileus versus distal obstruction.         Electronically signed by: Kathie Atwood   Date:    09/27/2023   Time:    06:42      MRI Lumbar Spine Without Contrast   Final Result      1. L3 vertebral body superior half acute compression deformity with paraspinal soft inflammations.  Combination retropulsed bony fragment and ventral epidural hematoma results in central canal stenosis.      2. Lumbar degenerative disc disease and spondylosis level by level discussed above.         Electronically signed by: Desmond Villa   Date:    09/23/2023   Time:    15:40      MRI Thoracic Spine Without Contrast   Final Result      No evidence for acute osseous finding or static listhesis      No significant herniation or bulge.  No canal, cord, or foraminal compromise      Mild thoracic spondylosis.         Electronically signed by: Jose Antonio Mak MD   Date:    09/23/2023   Time:    15:33      CT Ankle (Including Hindfoot) Without Contrast Right   Final Result      Calcaneus fracture as described above.         Electronically signed by: Jose Antonio Mak MD    Date:    09/23/2023   Time:    14:21      X-Ray Hand 3 view Left   Final Result      No acute osseous abnormality identified.         Electronically signed by: Desmond Villa   Date:    09/23/2023   Time:    13:53      X-Ray Knee Complete 4 or More Views Left   Final Result      No acute osseous abnormality identified.         Electronically signed by: Desmond Villa   Date:    09/23/2023   Time:    13:51      Xray Previous   Final Result      Xray Previous   Final Result      Xray Previous   Final Result      CT Previous   Final Result      CT Previous   Final Result      CT Previous   Final Result      CT Previous   Final Result      X-Ray Pelvis Routine AP   Final Result      Right subcapital femoral neck fracture.         Electronically signed by: Jose Antonio Mak MD   Date:    09/23/2023   Time:    12:31      X-Ray Femur Ap/Lat Right   Final Result      Right subcapital femoral neck fracture.         Electronically signed by: Jose Antonio Mak MD   Date:    09/23/2023   Time:    12:27      X-Ray Wrist Complete Right   Final Result      Mildly displaced dorsal triquetral fracture.      Nondisplaced distal radius fracture.         Electronically signed by: Jose Antonio Mak MD   Date:    09/23/2023   Time:    11:45      X-Ray Ankle Complete Right   Final Result      Fractured calcaneus.         Electronically signed by: Desmond Villa   Date:    09/23/2023   Time:    11:45         I have reviewed all pertinent imaging results/findings within the past 24 hours.    Micro/Path/Other:  Microbiology Results (last 7 days)       Procedure Component Value Units Date/Time    Blood Culture [[id number]]  (Normal) Collected: 10/03/23 0824    Order Status: Completed Specimen: Blood Updated: 10/04/23 1001     CULTURE, BLOOD ([facility]) No Growth At 24 Hours           Specimen (168h ago, onward)       Start     Ordered    09/29/23 1655  Specimen to Pathology  RELEASE UPON ORDERING        References:    Click here for ordering Quick Tip    Question:  Release to patient  Answer:  Immediate    09/29/23 1641                     Problems list:  Active Problem List with Overview Notes    Diagnosis Date Noted    Ileus 10/03/2023    Closed nondisplaced fracture of right calcaneus 09/24/2023    Closed fracture of right femur 09/24/2023    Epidural hematoma 09/24/2023    Spinal stenosis of lumbar region 09/24/2023    Closed fracture of third lumbar vertebra 09/23/2023    Radius fracture 09/23/2023    Closed right hip fracture 09/23/2023    Displaced fracture of body of right talus, initial encounter for closed fracture 09/23/2023        Assessment & Plan:   47 y.o. male admitted on 9/23/2023 following  fell or slept wall fall from a balcony of approximally 12th and 13th feet.  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      Consults:   Neurosurgery  Orthopedic surgery   Therapy:  Physical Therapy  Occupational Therapy Weight bearing status:   Will verify WB status with Ortho  Precautions:  Fall and Spinal   Seizure prophylaxis:  Not indicated. VTE prophylaxis:     Prophylactic Lovenox 40mg BID GI prophylaxis:  [facility]   Outpatient follow up:  Orthopedic surgery  Neurosurgery  Disposition:  Pending progress with therapy      -NG removed, ok to start soft diet  -Ambulating well with PT, recommending rehab vs. Home with   -Case management assisting with medicaid approval  -WBC downtrending  -UA was normal, Bcx pending  -Hold abx until cultures result  -Daily labs, replace lytes as necessary  - PO and IV pain meds  - Stop IVF  - transition to PO PPI daily  - Lovenox for DVT prophylaxis    Que Pérez MD  General Surgery

## 2023-10-04 NOTE — PT/OT/SLP PROGRESS
Physical Therapy Treatment    Patient Name:  José Henry   MRN:  [id number]    Recommendations:     Discharge Recommendations:  rehabilitation facility, home, home with home health, home health PT (pending progress with therapy)   Discharge Equipment Recommendations: platform, walker, rolling, bath bench, bedside commode     Subjective     Patient awake and alert.     Objective:     General Precautions: Standard, fall   Orthopedic Precautions:spinal precautions, RLE posterior precautions, RLE non weight bearing, RUE non weight bearing (can WB thru R elbow)   Braces:    Respiratory Status: Room air  Communicated with nurse prior to treatment.     Functional Mobility:    Rolling:Independent  Supine to sit:Stand-by Assistance  Sit to stand transfer: Minimal Assistance  Bed to chair transfer:Minimal Assistance    TLSO donned supine, gait 70 ft with PRW  min assist and NWB LUE/LE. Instructed in calf stretch with gait belt to decrease popliteal discomfort.     Education Provided:  Role and goals of PT, transfer training, bed mobility, gait training, balance training, safety awareness, assistive device, strengthening exercises, and importance of participating in PT to return to PLOF.         Skin Integrity: Visible skin intact    PT interventions performed during the course of today's session in an effort to prevent and/or reduce acquired pressure injuries:   Therapeutic positioning completed       GOALS:   Multidisciplinary Problems       Physical Therapy Goals          Problem: Physical Therapy    Goal Priority Disciplines Outcome Goal Variances Interventions   Physical Therapy Goal     PT, PT/OT Ongoing, Progressing     Description: Goals to be met by: 2023     Patient will increase functional independence with mobility by performin. Supine to sit with Modified [place]  2. Sit to stand transfer with Modified [place]  3. Gait  x 150 feet with Modified [place] using platform RW.                           Assessment:     José Henry is a 47 y.o. male admitted with a medical diagnosis of <principal problem not specified>.     Patient Active Problem List   Diagnosis    Closed fracture of third lumbar vertebra    Radius fracture    Closed right hip fracture    Displaced fracture of body of right talus, initial encounter for closed fracture    Closed nondisplaced fracture of right calcaneus    Closed fracture of right femur    Epidural hematoma    Spinal stenosis of lumbar region    Ileus        Rehab Prognosis: Good; patient would benefit from acute skilled PT services to address these deficits and reach maximum level of function.    Recent Surgery: Procedure(s) (LRB):  ARTHROPLASTY, HIP (Right) 5 Days Post-Op    Plan:     During this hospitalization, patient to be seen 6 x/week to address the identified rehab impairments via gait training, therapeutic activities, therapeutic exercises and progress toward the following goals:    Plan of Care Expires:  11/01/23    Billable Minutes     Billable Minutes: Gait Training 12 and Therapeutic Activity 11    Treatment Type: Treatment  PT/PTA: PTA     Number of PTA visits since last PT visit: 3     10/04/2023

## 2023-10-04 NOTE — PT/OT/SLP PROGRESS
Occupational Therapy   Treatment    Name: José Henry  MRN: [id number]  Admitting Diagnosis:  Closed fx of 3rd lumbar vertebra  5 Days Post-Op    Recommendations:     Discharge Recommendations: rehabilitation facility, home with home health (pending progress)  Discharge Equipment Recommendations:  platform, walker, rolling, bedside commode, bath bench  Barriers to discharge:       Assessment:     José Henry is a 47 y.o. male with a medical diagnosis of closed fx of 3rd lumbar vertebra.  Performance deficits affecting function are weakness, gait instability, impaired endurance, impaired balance, decreased lower extremity function, decreased upper extremity function, impaired self care skills, impaired functional mobility, orthopedic precautions.     Rehab Prognosis:  Good; patient would benefit from acute skilled OT services to address these deficits and reach maximum level of function.       Plan:     Patient to be seen 5 x/week to address the above listed problems via self-care/home management, therapeutic activities, therapeutic exercises  Plan of Care Expires: 10/29/23  Plan of Care Reviewed with: patient    Subjective     Pain/Comfort:  Pain Rating 1: 0/10    Objective:     Communicated with: RN prior to session.  Patient found supine with peripheral IV, telemetry upon OT entry to room.    General Precautions: Standard, fall    Orthopedic Precautions:spinal precautions, RLE posterior precautions, RLE non weight bearing, RUE non weight bearing (can WB through R elbow)  Braces: TLSO  Respiratory Status: Room air     Occupational Performance:     Bed Mobility:    Patient completed Supine to Sit with stand by assistance and cues for NWB on R arm    Functional Mobility/Transfers:  Patient completed Sit <> Stand Transfer with minimum assistance  with  platform walker   Patient completed Bed <> Chair Transfer using hop step technique with minimum assistance with platform walker  Functional Mobility: walked/hopped to  bathroom with Min A and PRW. Good adherence to NWB pxns on RLE. No LOB noted.     Activities of Daily Living:  Grooming: completed oral hygiene standing at sink with Min A.   UE dressing: donned TLSO with Min A.   Toileting: declined attempting toilet t/f 2/2 low height. Ordered BSC for easier completion of task.     Therapeutic Positioning    OT interventions performed during the course of today's session in an effort to prevent and/or reduce acquired pressure injuries:   Education was provided on benefits of and recommendations for therapeutic positioning    Skin assessment: all bony prominences were assessed    Findings: no redness or breakdown noted    [facility] 6 Click ADL: 20    Patient Education:  Patient provided with verbal education education regarding OT role/goals/POC, post op precautions, fall prevention, and safety awareness.  Understanding was verbalized, however additional teaching warranted.      Patient left up in chair with all lines intact, call button in reach, and mother present    GOALS:   Multidisciplinary Problems       Occupational Therapy Goals          Problem: Occupational Therapy    Goal Priority Disciplines Outcome Interventions   Occupational Therapy Goal     OT, PT/OT Ongoing, Progressing    Description: Goals to be met by 10/29/23:    Pt will complete grooming standing at sink with LRAD with SBA.  Pt will complete UB dressing with SBA.  Pt will complete LB dressing with SBA using AE  Pt will complete toileting with SBA using LRAD.  Pt will complete functional mobility to/from toilet and toilet transfer with SBA using LRAD.                          Time Tracking:     OT Date of Treatment: 10/04/23  OT Start Time: 1029  OT Stop Time: 1041  OT Total Time (min): 12 min    Billable Minutes:Self Care/Home Management 12    OT/JOSE: JOSE     Number of JOSE visits since last OT visit: 1    10/4/2023

## 2023-10-05 LAB
ABS NEUT (OLG): 10.52 X10(3)/MCL (ref 2.1–9.2)
ALBUMIN SERPL-MCNC: 2.5 G/DL (ref 3.5–5)
ALBUMIN/GLOB SERPL: 1 RATIO (ref 1.1–2)
ALP SERPL-CCNC: 70 UNIT/L (ref 40–150)
ALT SERPL-CCNC: 32 UNIT/L (ref 0–55)
AST SERPL-CCNC: 43 UNIT/L (ref 5–34)
BILIRUB SERPL-MCNC: 0.9 MG/DL
BUN SERPL-MCNC: 8.1 MG/DL (ref 8.9–20.6)
CALCIUM SERPL-MCNC: 7.9 MG/DL (ref 8.4–10.2)
CHLORIDE SERPL-SCNC: 99 MMOL/L (ref 98–107)
CO2 SERPL-SCNC: 25 MMOL/L (ref 22–29)
CREAT SERPL-MCNC: 0.61 MG/DL (ref 0.73–1.18)
CRP SERPL-MCNC: 66.7 MG/L
EOSINOPHIL NFR BLD MANUAL: 0.29 X10(3)/MCL (ref 0–0.9)
EOSINOPHIL NFR BLD MANUAL: 2 %
ERYTHROCYTE [DISTWIDTH] IN BLOOD BY AUTOMATED COUNT: 13.6 % (ref 11.5–17)
GFR SERPLBLD CREATININE-BSD FMLA CKD-EPI: >60 MLS/MIN/1.73/M2
GLOBULIN SER-MCNC: 2.6 GM/DL (ref 2.4–3.5)
GLUCOSE SERPL-MCNC: 126 MG/DL (ref 74–100)
HCT VFR BLD AUTO: 24.4 % (ref 42–52)
HGB BLD-MCNC: 8.3 G/DL (ref 14–18)
INSTRUMENT WBC (OLG): 14.61 X10(3)/MCL
LYMPHOCYTES NFR BLD MANUAL: 1.9 X10(3)/MCL
LYMPHOCYTES NFR BLD MANUAL: 13 %
MACROCYTES BLD QL SMEAR: ABNORMAL
MAGNESIUM SERPL-MCNC: 1.6 MG/DL (ref 1.6–2.6)
MCH RBC QN AUTO: 29.5 PG (ref 27–31)
MCHC RBC AUTO-ENTMCNC: 34 G/DL (ref 33–36)
MCV RBC AUTO: 86.8 FL (ref 80–94)
MONOCYTES NFR BLD MANUAL: 1.61 X10(3)/MCL (ref 0.1–1.3)
MONOCYTES NFR BLD MANUAL: 11 %
MYELOCYTES NFR BLD MANUAL: 1 %
NEUTROPHILS NFR BLD MANUAL: 72 %
NRBC BLD AUTO-RTO: 0 %
PHOSPHATE SERPL-MCNC: 3.1 MG/DL (ref 2.3–4.7)
PLATELET # BLD AUTO: 579 X10(3)/MCL (ref 130–400)
PLATELET # BLD EST: ABNORMAL 10*3/UL
PMV BLD AUTO: 9.1 FL (ref 7.4–10.4)
POLYCHROMASIA BLD QL SMEAR: ABNORMAL
POTASSIUM SERPL-SCNC: 3.2 MMOL/L (ref 3.5–5.1)
PREALB SERPL-MCNC: 11.6 MG/DL (ref 18–45)
PROT SERPL-MCNC: 5.1 GM/DL (ref 6.4–8.3)
PSYCHE PATHOLOGY RESULT: NORMAL
RBC # BLD AUTO: 2.81 X10(6)/MCL (ref 4.7–6.1)
RBC MORPH BLD: ABNORMAL
SODIUM SERPL-SCNC: 132 MMOL/L (ref 136–145)
WBC # SPEC AUTO: 14.61 X10(3)/MCL (ref 4.5–11.5)

## 2023-10-05 PROCEDURE — 25000003 PHARM REV CODE 250: Performed by: NURSE PRACTITIONER

## 2023-10-05 PROCEDURE — 80053 COMPREHEN METABOLIC PANEL: CPT | Performed by: NURSE PRACTITIONER

## 2023-10-05 PROCEDURE — 21400001 HC TELEMETRY ROOM

## 2023-10-05 PROCEDURE — 86140 C-REACTIVE PROTEIN: CPT | Performed by: NURSE PRACTITIONER

## 2023-10-05 PROCEDURE — 84100 ASSAY OF PHOSPHORUS: CPT | Performed by: NURSE PRACTITIONER

## 2023-10-05 PROCEDURE — 83735 ASSAY OF MAGNESIUM: CPT | Performed by: NURSE PRACTITIONER

## 2023-10-05 PROCEDURE — 63600175 PHARM REV CODE 636 W HCPCS: Performed by: NURSE PRACTITIONER

## 2023-10-05 PROCEDURE — 84134 ASSAY OF PREALBUMIN: CPT | Performed by: NURSE PRACTITIONER

## 2023-10-05 PROCEDURE — 63600175 PHARM REV CODE 636 W HCPCS

## 2023-10-05 PROCEDURE — 85027 COMPLETE CBC AUTOMATED: CPT | Performed by: NURSE PRACTITIONER

## 2023-10-05 PROCEDURE — 97116 GAIT TRAINING THERAPY: CPT | Mod: CQ

## 2023-10-05 PROCEDURE — 11000001 HC ACUTE MED/SURG PRIVATE ROOM

## 2023-10-05 PROCEDURE — 97530 THERAPEUTIC ACTIVITIES: CPT | Mod: CQ

## 2023-10-05 RX ORDER — POTASSIUM CHLORIDE 14.9 MG/ML
20 INJECTION INTRAVENOUS
Status: DISPENSED | OUTPATIENT
Start: 2023-10-05 | End: 2023-10-05

## 2023-10-05 RX ADMIN — Medication 1 TABLET: at 09:10

## 2023-10-05 RX ADMIN — FOLIC ACID 1 MG: 1 TABLET ORAL at 09:10

## 2023-10-05 RX ADMIN — OXYCODONE HYDROCHLORIDE 10 MG: 10 TABLET ORAL at 06:10

## 2023-10-05 RX ADMIN — ENOXAPARIN SODIUM 40 MG: 40 INJECTION SUBCUTANEOUS at 09:10

## 2023-10-05 RX ADMIN — Medication 6 MG: at 09:10

## 2023-10-05 RX ADMIN — OXYCODONE HYDROCHLORIDE 10 MG: 10 TABLET ORAL at 12:10

## 2023-10-05 RX ADMIN — GABAPENTIN 300 MG: 300 CAPSULE ORAL at 09:10

## 2023-10-05 RX ADMIN — POTASSIUM CHLORIDE 20 MEQ: 14.9 INJECTION, SOLUTION INTRAVENOUS at 11:10

## 2023-10-05 RX ADMIN — DOCUSATE SODIUM 100 MG: 50 LIQUID ORAL at 09:10

## 2023-10-05 RX ADMIN — GABAPENTIN 300 MG: 300 CAPSULE ORAL at 02:10

## 2023-10-05 RX ADMIN — POLYETHYLENE GLYCOL 3350 17 G: 17 POWDER, FOR SOLUTION ORAL at 09:10

## 2023-10-05 RX ADMIN — POTASSIUM CHLORIDE 20 MEQ: 14.9 INJECTION, SOLUTION INTRAVENOUS at 05:10

## 2023-10-05 RX ADMIN — THIAMINE HCL TAB 100 MG 100 MG: 100 TAB at 09:10

## 2023-10-05 RX ADMIN — PANTOPRAZOLE SODIUM 40 MG: 40 TABLET, DELAYED RELEASE ORAL at 09:10

## 2023-10-05 RX ADMIN — OXYCODONE HYDROCHLORIDE 10 MG: 10 TABLET ORAL at 09:10

## 2023-10-05 RX ADMIN — THERA TABS 1 TABLET: TAB at 09:10

## 2023-10-05 RX ADMIN — OXYCODONE HYDROCHLORIDE 10 MG: 10 TABLET ORAL at 05:10

## 2023-10-05 NOTE — PT/OT/SLP PROGRESS
Physical Therapy Treatment    Patient Name:  José Henry   MRN:  [id number]    Recommendations:     Discharge Recommendations:  rehabilitation facility, home, home with home health, home health PT (pending progress with therapy)   Discharge Equipment Recommendations: platform, walker, rolling, bath bench, bedside commode     Subjective     Patient awake and alert.     Objective:     General Precautions: Standard, fall   Orthopedic Precautions:spinal precautions, RLE posterior precautions, RLE non weight bearing, RUE non weight bearing (can WB thru R elbow)   Braces:    Respiratory Status: Room air  Communicated with nurse prior to treatment.     Functional Mobility:    Rolling:Independent  Supine to sit:Independent  Sit to stand transfer: Minimal Assistance  Bed to chair transfer:Minimal Assistance    Gait 100 ft and 15 ft x 2 with PRW. Pt able to get to commode and had BM. Assist with hygiene. TLSO donned supine and NWB to RUE/LE.     Education Provided:  Role and goals of PT, transfer training, bed mobility, gait training, balance training, safety awareness, assistive device, strengthening exercises, and importance of participating in PT to return to PLOF.         Skin Integrity: dressing to right arm and leg.     PT interventions performed during the course of today's session in an effort to prevent and/or reduce acquired pressure injuries:   Therapeutic positioning completed       GOALS:   Multidisciplinary Problems       Physical Therapy Goals          Problem: Physical Therapy    Goal Priority Disciplines Outcome Goal Variances Interventions   Physical Therapy Goal     PT, PT/OT Ongoing, Progressing     Description: Goals to be met by: 2023     Patient will increase functional independence with mobility by performin. Supine to sit with Modified [place]  2. Sit to stand transfer with Modified [place]  3. Gait  x 150 feet with Modified [place] using platform RW.                           Assessment:     José Henry is a 47 y.o. male admitted with a medical diagnosis of <principal problem not specified>.     Patient Active Problem List   Diagnosis    Closed fracture of third lumbar vertebra    Radius fracture    Closed right hip fracture    Displaced fracture of body of right talus, initial encounter for closed fracture    Closed nondisplaced fracture of right calcaneus    Closed fracture of right femur    Epidural hematoma    Spinal stenosis of lumbar region    Ileus        Rehab Prognosis: Good; patient would benefit from acute skilled PT services to address these deficits and reach maximum level of function.    Recent Surgery: Procedure(s) (LRB):  ARTHROPLASTY, HIP (Right) 6 Days Post-Op    Plan:     During this hospitalization, patient to be seen 6 x/week to address the identified rehab impairments via gait training, therapeutic activities, therapeutic exercises and progress toward the following goals:    Plan of Care Expires:  11/01/23    Billable Minutes     Billable Minutes: Gait Training 25 and Therapeutic Activity 15    Treatment Type: Treatment  PT/PTA: PTA     Number of PTA visits since last PT visit: 4     10/05/2023

## 2023-10-05 NOTE — PROGRESS NOTES
"   Trauma Surgery   Progress Note  Admit Date: 9/23/2023  HD#12  POD#6 Days Post-Op    Subjective:   Interval history:  ELI SILVEIRA  Had a small BM yesterday, passing flatus  UOP appropriate  Tolerated soft diet, will advance to regular diet  WBC stable at 14  PT recommending rehab vs. Home with     Home Meds:   Current Outpatient Medications   Medication Instructions    dextroamphetamine-amphetamine 30 mg Tab 1 tablet, Oral, Daily    pantoprazole (PROTONIX) 40 MG tablet 1 tablet, Oral, Daily      Scheduled Meds:   calcium-vitamin D3  1 tablet Oral BID    docusate  100 mg Oral BID    enoxparin  40 mg Subcutaneous Q12H (prophylaxis, 0900/2100)    folic acid  1 mg Oral Daily    gabapentin  300 mg Oral TID    multivitamin  1 tablet Oral Daily    pantoprazole  40 mg Oral Daily    polyethylene glycol  17 g Oral BID    thiamine  100 mg Oral Daily     Continuous Infusions:      PRN Meds:heparin, porcine (PF), melatonin, metoclopramide HCl, midazolam, ondansetron, oxyCODONE, promethazine     Objective:     VITAL SIGNS: 24 HR MIN & MAX LAST   Temp  Min: 97.5 °F (36.4 °C)  Max: 99 °F (37.2 °C)  98 °F (36.7 °C)   BP  Min: 110/79  Max: 124/83  116/76    Pulse  Min: 90  Max: 103  92    Resp  Min: 17  Max: 20  17    SpO2  Min: 94 %  Max: 99 %  (!) 94 %      HT: 6' 2.02" (188 cm)  WT: 93.9 kg (207 lb)  BMI: 26.6     Intake/output:  Intake/Output - Last 3 Shifts         10/03 0700  10/04 0659 10/04 0700  10/05 0659    P.O.  960    I.V. (mL/kg) 1913.5 (20.4)     IV Piggyback 87.5     Total Intake(mL/kg) 2001 (21.3) 960 (10.2)    Urine (mL/kg/hr) 725 (0.3) 300 (0.1)    Total Output 725 300    Net +1276 +660                  Intake/Output Summary (Last 24 hours) at 10/5/2023 0612  Last data filed at 10/4/2023 1524  Gross per 24 hour   Intake 2961.02 ml   Output 300 ml   Net 2661.02 ml         Lines/drains/airway:       Peripheral IV - Single Lumen 09/26/23 2200 Anterior;Left Forearm (Active)   Site Assessment Clean;Dry;Intact;No " redness;No swelling;No warmth;No drainage 10/01/23 0400   Extremity Assessment Distal to IV No abnormal discoloration 10/01/23 0400   Line Status Infusing 10/01/23 0400   Dressing Status Clean;Dry;Intact 10/01/23 0400   Dressing Intervention Integrity maintained 10/01/23 0400   Number of days: 4            Peripheral IV - Single Lumen 09/27/23 0728 18 G Left Hand (Active)   Site Assessment Clean;Dry;Intact;No redness;No swelling;No warmth;No drainage 10/01/23 0400   Extremity Assessment Distal to IV No abnormal discoloration 10/01/23 0400   Line Status Saline locked 10/01/23 0400   Dressing Status Clean;Dry;Intact 10/01/23 0400   Dressing Intervention Integrity maintained 10/01/23 0400   Number of days: 4            NG/OG Tube 09/27/23 1730 nasogastric Left nostril (Active)   Placement Check placement verified by aspirate characteristics 10/01/23 0400   Distal Tube Length (cm) 53 10/01/23 0000   Tolerance no signs/symptoms of discomfort 10/01/23 0400   Securement secured to nostril center w/ adhesive device 10/01/23 0400   Clamp Status/Tolerance unclamped;no emesis;no nausea;no restlessness 10/01/23 0400   Suction Setting/Drainage Method suction at;low;intermittent setting 10/01/23 0400   Insertion Site Appearance no redness, warmth, tenderness, skin breakdown, drainage 10/01/23 0400   Drainage Bile;Green 10/01/23 0400   Flush/Irrigation flushed w/;water;no resistance met 09/29/23 2000   Tube Output(mL)(Include Discarded Residual) 250 mL 10/01/23 0528   Number of days: 3       Male External Urinary Catheter 09/30/23 0500 (Active)   Collection Container Urimeter 10/01/23 0400   Securement Method secured to top of thigh w/ adhesive device 10/01/23 0400   Skin no redness;no breakdown;penis/scrotum cleansed w/ soap and water 10/01/23 0400   Tolerance no signs/symptoms of discomfort;assisted with appliance change 10/01/23 0400   Output (mL) 100 mL 10/01/23 0528   Catheter Change Date 09/30/23 10/01/23 0400   Catheter  "Change Time 0430 10/01/23 0400   Number of days: 1       Physical examination:  Gen: NAD, AAOx3, answering questions appropriately  HEENT: normocephalic, atraumatic, EOMI, PERRL  CV: RR  Resp: NWOB  Abd: S/distended, nontender  Msk: moving all extremities spontaneously and purposefully- reduced to RUE/RLE  Neuro: CN II-XII grossly intact GCS 15  Skin/wounds: warm dry, dressings to RUE RLE    Labs:  Renal:  Recent Labs     10/03/23  0420 10/04/23  0346 10/05/23  0412   BUN 12.9 11.0 8.1*   CREATININE 0.63* 0.61* 0.61*     No results for input(s): "LACTIC" in the last 72 hours.  FEN/GI:  Recent Labs     10/03/23  0420 10/04/23  0346 10/05/23  0412    134* 132*   K 2.9* 2.9* 3.2*   CO2 26 26 25   CALCIUM 8.1* 7.9* 7.9*   MG 1.70 1.60 1.60   PHOS 3.2 3.6 3.1   ALBUMIN 2.6* 2.5* 2.5*   BILITOT 0.6 0.7 0.9   AST 26 28 43*   ALKPHOS 53 54 70   ALT 21 20 32     Heme:  Recent Labs     10/03/23  0420 10/04/23  0346 10/05/23  0412   HGB 8.6* 8.3* 8.3*   HCT 25.1* 24.1* 24.4*   * 495* 579*     ID:  Recent Labs     10/03/23  0420 10/04/23  0346 10/05/23  0412   WBC 19.13  19.13* 14.98  14.98* 14.61*     CBG:  Recent Labs     10/03/23  0420 10/04/23  0346 10/05/23  0412   GLUCOSE 87 100 126*      No results for input(s): "POCTGLUCOSE" in the last 72 hours.   Cardiovascular:  No results for input(s): "TROPONINI", "CKTOTAL", "CKMB", "BNP" in the last 168 hours.  I have reviewed all pertinent lab results within the past 24 hours.    Imaging:  CT Abdomen Pelvis With Contrast   Final Result      1. Moderate dilatation of small-bowel loops with gradual transition to normal caliber small bowel distally.  Favor ileus over obstruction.   2. Fluid-filled colon which could be seen with a mild colitis or other diarrheal illness.   3. Enteric tube tip just past the GE junction.  Suggest advancing at least 5 cm if possible.   4. Other findings above.         Electronically signed by: Rtichie Coker   Date:    10/02/2023 "   Time:    13:04      X-Ray Abdomen AP 1 View   Final Result      As above.         Electronically signed by: Stevo Thorne   Date:    10/01/2023   Time:    09:36      X-Ray Hip 1 View Right (with Pelvis when performed)   Final Result      Expected postoperative changes.         Electronically signed by: Stevo Thorne   Date:    09/29/2023   Time:    18:23      X-Ray Chest 1 View   Final Result      XR Gastric tube check, non-radiologist performed   Final Result      Nasogastric tube terminates within the gastric fundus.         Electronically signed by: Desmond Villa   Date:    09/27/2023   Time:    17:51      SURG FL Surgery Fluoro Usage   Final Result      X-Ray Abdomen AP 1 View   Final Result      Progressive gaseous distension of the bowel.  Consider ileus versus distal obstruction.         Electronically signed by: Kathie Atwood   Date:    09/27/2023   Time:    06:42      MRI Lumbar Spine Without Contrast   Final Result      1. L3 vertebral body superior half acute compression deformity with paraspinal soft inflammations.  Combination retropulsed bony fragment and ventral epidural hematoma results in central canal stenosis.      2. Lumbar degenerative disc disease and spondylosis level by level discussed above.         Electronically signed by: Desmond Villa   Date:    09/23/2023   Time:    15:40      MRI Thoracic Spine Without Contrast   Final Result      No evidence for acute osseous finding or static listhesis      No significant herniation or bulge.  No canal, cord, or foraminal compromise      Mild thoracic spondylosis.         Electronically signed by: Jose Antonio Mak MD   Date:    09/23/2023   Time:    15:33      CT Ankle (Including Hindfoot) Without Contrast Right   Final Result      Calcaneus fracture as described above.         Electronically signed by: Jose Antonio Mak MD   Date:    09/23/2023   Time:    14:21      X-Ray Hand 3 view Left   Final Result      No acute osseous abnormality identified.          Electronically signed by: Desmond Villa   Date:    09/23/2023   Time:    13:53      X-Ray Knee Complete 4 or More Views Left   Final Result      No acute osseous abnormality identified.         Electronically signed by: Desmond Villa   Date:    09/23/2023   Time:    13:51      Xray Previous   Final Result      Xray Previous   Final Result      Xray Previous   Final Result      CT Previous   Final Result      CT Previous   Final Result      CT Previous   Final Result      CT Previous   Final Result      X-Ray Pelvis Routine AP   Final Result      Right subcapital femoral neck fracture.         Electronically signed by: Jose Antonio Mak MD   Date:    09/23/2023   Time:    12:31      X-Ray Femur Ap/Lat Right   Final Result      Right subcapital femoral neck fracture.         Electronically signed by: Jose Antonio Mak MD   Date:    09/23/2023   Time:    12:27      X-Ray Wrist Complete Right   Final Result      Mildly displaced dorsal triquetral fracture.      Nondisplaced distal radius fracture.         Electronically signed by: Jose Antonio Mak MD   Date:    09/23/2023   Time:    11:45      X-Ray Ankle Complete Right   Final Result      Fractured calcaneus.         Electronically signed by: Desmond Villa   Date:    09/23/2023   Time:    11:45         I have reviewed all pertinent imaging results/findings within the past 24 hours.    Micro/Path/Other:  Microbiology Results (last 7 days)       Procedure Component Value Units Date/Time    Blood Culture [[id number]]  (Normal) Collected: 10/03/23 0824    Order Status: Completed Specimen: Blood Updated: 10/04/23 1001     CULTURE, BLOOD ([facility]) No Growth At 24 Hours           Specimen (168h ago, onward)       Start     Ordered    09/29/23 1655  Specimen to Pathology  RELEASE UPON ORDERING        References:    Click here for ordering Quick Tip   Question:  Release to patient  Answer:  Immediate    09/29/23 1658                     Problems list:  Active Problem List with  Overview Notes    Diagnosis Date Noted    Ileus 10/03/2023    Closed nondisplaced fracture of right calcaneus 09/24/2023    Closed fracture of right femur 09/24/2023    Epidural hematoma 09/24/2023    Spinal stenosis of lumbar region 09/24/2023    Closed fracture of third lumbar vertebra 09/23/2023    Radius fracture 09/23/2023    Closed right hip fracture 09/23/2023    Displaced fracture of body of right talus, initial encounter for closed fracture 09/23/2023        Assessment & Plan:   47 y.o. male admitted on 9/23/2023 following  fell or slept wall fall from a balcony of approximally 12th and 13th feet.  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      Consults:   Neurosurgery  Orthopedic surgery   Therapy:  Physical Therapy  Occupational Therapy Weight bearing status:   Will verify WB status with Ortho  Precautions:  Fall and Spinal   Seizure prophylaxis:  Not indicated. VTE prophylaxis:     Prophylactic Lovenox 40mg BID GI prophylaxis:  H2B   Outpatient follow up:  Orthopedic surgery  Neurosurgery  Disposition:  Pending progress with therapy      -Advance diet as tolerated  -Ambulating well with PT, recommending rehab vs. Home with HH  -Case management assisting with medicaid approval  -WBC stable at 14.  -UA was normal, Bcx NG at 24 hrs.   -Hold abx until cultures result  -Daily labs, replace lytes as necessary  - PO and IV pain meds  - Stop IVF  - transition to PO PPI daily  - Lovenox for DVT prophylaxis    Que Pérez MD  General Surgery

## 2023-10-05 NOTE — PROGRESS NOTES
Inpatient Nutrition Assessment    Admit Date: 9/23/2023   Total duration of encounter: 12 days     Nutrition Recommendation/Prescription     Continue regular diet  Continue vitamin supplementation   Boost Plus TID provide an additional 360 kcal and 14 g protein per container      Communication of Recommendations: reviewed with nurse    Nutrition Assessment     Malnutrition Assessment/Nutrition-Focused Physical Exam    Malnutrition Context: acute illness or injury (09/29/23 1508)  Malnutrition Level:  (does not meet criteria) (09/29/23 1508)  Energy Intake (Malnutrition): less than or equal to 50% for greater than or equal to 5 days (09/29/23 1508)  Weight Loss (Malnutrition):  (does not meet criteria) (09/29/23 1508)  Subcutaneous Fat (Malnutrition):  (does not meet criteria) (09/29/23 1508)           Muscle Mass (Malnutrition):  (does not meet criteria) (09/29/23 1508)                                   A minimum of two characteristics is recommended for diagnosis of either severe or non-severe malnutrition.    Chart Review    Reason Seen: length of stay    Malnutrition Screening Tool Results   Have you recently lost weight without trying?: No  Have you been eating poorly because of a decreased appetite?: No   MST Score: 0   Diagnosis:  POD#2 s/p L1 to L5 posterolateral fusion with L2-3 laminectomy for a L3 burst fracture  Ileus vs distal obstruction  Slept walk off balcony resulting in R hip fx, L3 burst fx, central cord stenosis, calcaneus fx, displaced fx of talus    Relevant Medical History: ETOH use, smoker, GERD    Nutrition-Related Medications: lactated ringers, calcium-vitamin D3, Pepcid, folic acid, MCI, thiamine, suppository prn, zofran prn  Calorie Containing IV Medications: no significant kcals from medications at this time    Nutrition-Related Labs: 9/29: RBC-3.59, H/H-10.8/31.7, glu-103, sherif-8.0  10/3: K 2.9, Crea 0.63, Ca 8.1  10/5: Na 132, K 3.2, BUN 8.1, Crea0.61, Glu 126, Ca 7.9, PAB 11.6, CRP  "66.7      Diet/PN Order: Diet Adult Regular  Oral Supplement Order: Boost Plus  Tube Feeding Order: none  Appetite/Oral Intake: good/% of meals  Factors Affecting Nutritional Intake: none identified  Food/Adventist/Cultural Preferences: none reported  Food Allergies: none reported    Wound(s):       Last Bowel Movement: 10/03/23    Comments    : pt NPO x 2 days, NGT to suction for ileus. Pt to OR today for hip ORIF. Pt reports poor appetite since admission, with good appetite prior. No Gi issues reported.  lb with no unintentional weight loss prior admission.    10/3: pt started on clear liquid diet this morning, NG tube clamped, will add Boost Breeze; has been NPO or clear liquid diet x 5-6 days    10/5: tolerating regular diet, reports good intake, drinking oral supplement and requesting to switch to chocolate flavor    Anthropometrics    Height: 6' 2.02" (188 cm) Height Method: Stated  Last Weight: 93.9 kg (207 lb) (23 1056) Weight Method: Bed Scale  BMI (Calculated): 26.6  BMI Classification: overweight (BMI 25-29.9)        Ideal Body Weight (IBW), Male: 190.12 lb     % Ideal Body Weight, Male (lb): 108.95 %                 Usual Body Weight (UBW), k.9 kg  % Usual Body Weight: 100.2     Usual Weight Provided By: patient and patient denies unintentional weight loss    Wt Readings from Last 5 Encounters:   23 93.9 kg (207 lb)   20 104.1 kg (229 lb 8 oz)     Weight Change(s) Since Admission:  Admit Weight: 93.9 kg (207 lb) (23 1056)      Estimated Needs    Weight Used For Calorie Calculations: 93.9 kg (207 lb 0.2 oz)  Energy Calorie Requirements (kcal): 1544-8469 kcal (1.1-1.2 stress factor  Energy Need Method: [facility]  Weight Used For Protein Calculations: 93.9 kg (207 lb 0.2 oz)  Protein Requirements:  g (1.0-1.2 g/kg)  Fluid Requirements (mL): 2856-5125 mL (1 mL/kcal)  Temp (24hrs), Av.4 °F (36.9 °C), Min:98 °F (36.7 °C), Max:99 °F (37.2 °C)   "     Enteral Nutrition    Patient not receiving enteral nutrition at this time.    Parenteral Nutrition    Patient not receiving parenteral nutrition support at this time.    Evaluation of Received Nutrient Intake    Calories: meeting estimated needs  Protein: meeting estimated needs    Patient Education    Not applicable.    Nutrition Diagnosis     PES: Inadequate energy intake related to inability to consume sufficient nutrients as evidenced by <50% est energy needs met x 5-6 days. (improving)    Interventions/Goals     Intervention(s): general/healthful diet, commercial beverage, multivitamin/mineral supplement therapy, and collaboration with other providers  Goal: Meet greater than 75% of nutritional needs by follow-up. (goal progressing)    Monitoring & Evaluation     Dietitian will monitor energy intake, weight, electrolyte/renal panel, and glucose/endocrine profile.  Nutrition Risk/Follow-Up: high (follow-up in 1-4 days)   Please consult if re-assessment needed sooner.

## 2023-10-06 LAB
ABS NEUT (OLG): 9.93 X10(3)/MCL (ref 2.1–9.2)
ALBUMIN SERPL-MCNC: 2.7 G/DL (ref 3.5–5)
ALBUMIN/GLOB SERPL: 1.2 RATIO (ref 1.1–2)
ALP SERPL-CCNC: 105 UNIT/L (ref 40–150)
ALT SERPL-CCNC: 58 UNIT/L (ref 0–55)
AST SERPL-CCNC: 75 UNIT/L (ref 5–34)
BILIRUB SERPL-MCNC: 1 MG/DL
BUN SERPL-MCNC: 7.7 MG/DL (ref 8.9–20.6)
CALCIUM SERPL-MCNC: 8.1 MG/DL (ref 8.4–10.2)
CHLORIDE SERPL-SCNC: 101 MMOL/L (ref 98–107)
CO2 SERPL-SCNC: 26 MMOL/L (ref 22–29)
CREAT SERPL-MCNC: 0.65 MG/DL (ref 0.73–1.18)
EOSINOPHIL NFR BLD MANUAL: 0.14 X10(3)/MCL (ref 0–0.9)
EOSINOPHIL NFR BLD MANUAL: 1 %
ERYTHROCYTE [DISTWIDTH] IN BLOOD BY AUTOMATED COUNT: 14.1 % (ref 11.5–17)
GFR SERPLBLD CREATININE-BSD FMLA CKD-EPI: >60 MLS/MIN/1.73/M2
GLOBULIN SER-MCNC: 2.2 GM/DL (ref 2.4–3.5)
GLUCOSE SERPL-MCNC: 112 MG/DL (ref 74–100)
HCT VFR BLD AUTO: 24.9 % (ref 42–52)
HGB BLD-MCNC: 8.5 G/DL (ref 14–18)
INSTRUMENT WBC (OLG): 13.6 X10(3)/MCL
LYMPHOCYTES NFR BLD MANUAL: 17 %
LYMPHOCYTES NFR BLD MANUAL: 2.31 X10(3)/MCL
MAGNESIUM SERPL-MCNC: 1.8 MG/DL (ref 1.6–2.6)
MCH RBC QN AUTO: 29.9 PG (ref 27–31)
MCHC RBC AUTO-ENTMCNC: 34.1 G/DL (ref 33–36)
MCV RBC AUTO: 87.7 FL (ref 80–94)
MONOCYTES NFR BLD MANUAL: 1.36 X10(3)/MCL (ref 0.1–1.3)
MONOCYTES NFR BLD MANUAL: 10 %
NEUTROPHILS NFR BLD MANUAL: 73 %
NRBC BLD AUTO-RTO: 0 %
PHOSPHATE SERPL-MCNC: 3.9 MG/DL (ref 2.3–4.7)
PLATELET # BLD AUTO: 622 X10(3)/MCL (ref 130–400)
PLATELET # BLD EST: ABNORMAL 10*3/UL
PMV BLD AUTO: 9.2 FL (ref 7.4–10.4)
POTASSIUM SERPL-SCNC: 4.2 MMOL/L (ref 3.5–5.1)
PROT SERPL-MCNC: 4.9 GM/DL (ref 6.4–8.3)
RBC # BLD AUTO: 2.84 X10(6)/MCL (ref 4.7–6.1)
RBC MORPH BLD: NORMAL
SODIUM SERPL-SCNC: 134 MMOL/L (ref 136–145)
WBC # SPEC AUTO: 13.6 X10(3)/MCL (ref 4.5–11.5)

## 2023-10-06 PROCEDURE — 84100 ASSAY OF PHOSPHORUS: CPT | Performed by: NURSE PRACTITIONER

## 2023-10-06 PROCEDURE — 80053 COMPREHEN METABOLIC PANEL: CPT | Performed by: NURSE PRACTITIONER

## 2023-10-06 PROCEDURE — 21400001 HC TELEMETRY ROOM

## 2023-10-06 PROCEDURE — 25000003 PHARM REV CODE 250: Performed by: NURSE PRACTITIONER

## 2023-10-06 PROCEDURE — 85027 COMPLETE CBC AUTOMATED: CPT | Performed by: NURSE PRACTITIONER

## 2023-10-06 PROCEDURE — 97535 SELF CARE MNGMENT TRAINING: CPT | Mod: CO

## 2023-10-06 PROCEDURE — 83735 ASSAY OF MAGNESIUM: CPT | Performed by: NURSE PRACTITIONER

## 2023-10-06 PROCEDURE — 11000001 HC ACUTE MED/SURG PRIVATE ROOM

## 2023-10-06 PROCEDURE — 97164 PT RE-EVAL EST PLAN CARE: CPT

## 2023-10-06 PROCEDURE — 63600175 PHARM REV CODE 636 W HCPCS: Performed by: NURSE PRACTITIONER

## 2023-10-06 RX ORDER — PROMETHAZINE HYDROCHLORIDE 25 MG/ML
25 INJECTION, SOLUTION INTRAMUSCULAR; INTRAVENOUS EVERY 6 HOURS PRN
Status: DISCONTINUED | OUTPATIENT
Start: 2023-10-06 | End: 2023-10-16 | Stop reason: HOSPADM

## 2023-10-06 RX ADMIN — THERA TABS 1 TABLET: TAB at 08:10

## 2023-10-06 RX ADMIN — THIAMINE HCL TAB 100 MG 100 MG: 100 TAB at 08:10

## 2023-10-06 RX ADMIN — OXYCODONE HYDROCHLORIDE 10 MG: 10 TABLET ORAL at 02:10

## 2023-10-06 RX ADMIN — OXYCODONE HYDROCHLORIDE 10 MG: 10 TABLET ORAL at 07:10

## 2023-10-06 RX ADMIN — DOCUSATE SODIUM 100 MG: 50 LIQUID ORAL at 09:10

## 2023-10-06 RX ADMIN — POLYETHYLENE GLYCOL 3350 17 G: 17 POWDER, FOR SOLUTION ORAL at 08:10

## 2023-10-06 RX ADMIN — Medication 6 MG: at 09:10

## 2023-10-06 RX ADMIN — OXYCODONE HYDROCHLORIDE 10 MG: 10 TABLET ORAL at 08:10

## 2023-10-06 RX ADMIN — Medication 1 TABLET: at 08:10

## 2023-10-06 RX ADMIN — ENOXAPARIN SODIUM 40 MG: 40 INJECTION SUBCUTANEOUS at 08:10

## 2023-10-06 RX ADMIN — DOCUSATE SODIUM 100 MG: 50 LIQUID ORAL at 08:10

## 2023-10-06 RX ADMIN — FOLIC ACID 1 MG: 1 TABLET ORAL at 08:10

## 2023-10-06 RX ADMIN — OXYCODONE HYDROCHLORIDE 10 MG: 10 TABLET ORAL at 03:10

## 2023-10-06 RX ADMIN — POLYETHYLENE GLYCOL 3350 17 G: 17 POWDER, FOR SOLUTION ORAL at 09:10

## 2023-10-06 RX ADMIN — GABAPENTIN 300 MG: 300 CAPSULE ORAL at 09:10

## 2023-10-06 RX ADMIN — PANTOPRAZOLE SODIUM 40 MG: 40 TABLET, DELAYED RELEASE ORAL at 08:10

## 2023-10-06 RX ADMIN — GABAPENTIN 300 MG: 300 CAPSULE ORAL at 02:10

## 2023-10-06 RX ADMIN — GABAPENTIN 300 MG: 300 CAPSULE ORAL at 08:10

## 2023-10-06 RX ADMIN — Medication 1 TABLET: at 09:10

## 2023-10-06 RX ADMIN — ENOXAPARIN SODIUM 40 MG: 40 INJECTION SUBCUTANEOUS at 09:10

## 2023-10-06 NOTE — PT/OT/SLP PROGRESS
Occupational Therapy   Treatment    Name: José Henry  MRN: [id number]  Admitting Diagnosis:  Closed fx of lumbar vertebra  7 Days Post-Op    Recommendations:     Discharge Recommendations: rehabilitation facility, home with home health  Discharge Equipment Recommendations:  platform, walker, rolling, bedside commode, bath bench  Barriers to discharge:       Assessment:     José Henry is a 47 y.o. male with a medical diagnosis of closed fx of lumbar vertebra. Performance deficits affecting function are weakness, gait instability, impaired endurance, impaired balance, decreased lower extremity function, decreased upper extremity function, impaired self care skills, impaired functional mobility, orthopedic precautions. Tolerated session well and progressing towards goals.     Rehab Prognosis:  Good; patient would benefit from acute skilled OT services to address these deficits and reach maximum level of function.       Plan:     Patient to be seen 5 x/week to address the above listed problems via self-care/home management, therapeutic activities, therapeutic exercises  Plan of Care Expires: 10/29/23  Plan of Care Reviewed with: patient    Subjective     Pain/Comfort:  Pain Rating 1: 0/10    Objective:     Communicated with: RN prior to session.  Patient found supine with telemetry upon OT entry to room.    General Precautions: Standard, fall    Orthopedic Precautions:spinal precautions, RLE posterior precautions, RUE non weight bearing, RLE non weight bearing (can WB through R elbow on PRW)  Braces: TLSO  Respiratory Status: Room air     Occupational Performance:     Bed Mobility:    Patient completed Supine to Sit with stand by assistance  Patient completed Sit to Supine with stand by assistance     Functional Mobility/Transfers:  Patient completed Sit <> Stand Transfer with minimum assistance  with  platform walker   Functional Mobility: CGA with PRW to bathroom. No LOB noted and good adherence to pxns.      Activities of Daily Living:  Grooming: completed oral hygiene standing at sink with CGA.   Toileting: performed toilet t/f with Min A. BSC over toilet for increased height and easier sit>stand. Able to perform pericare. Needed Min A for management of underwear in standing.  UE dressing: donned/doffed gown with independence. Min A to don TLSO prior to mobility. Able to don/doff R arm brace independently.   LE dressing: Max A to don CAM boot and socks seated EOB.   Showering: performed shower t/f with Min A. Good adherence to NWB pxns. Cues for use of grab bars. Needed assistance for washing and drying lower body 2/2 hip and spinal pxns. Will provide long handled sponge.     Therapeutic Positioning    OT interventions performed during the course of today's session in an effort to prevent and/or reduce acquired pressure injuries:   Education was provided on benefits of and recommendations for therapeutic positioning    Skin assessment: all bony prominences were assessed    Findings: no redness or breakdown noted    [facility] 6 Click ADL: 19    Patient Education:  Patient provided with verbal education education regarding OT role/goals/POC, post op precautions, fall prevention, and safety awareness.  Understanding was verbalized.      Patient left supine with all lines intact and call button in reach    GOALS:   Multidisciplinary Problems       Occupational Therapy Goals          Problem: Occupational Therapy    Goal Priority Disciplines Outcome Interventions   Occupational Therapy Goal     OT, PT/OT Ongoing, Progressing    Description: Goals to be met by 10/29/23:    Pt will complete grooming standing at sink with LRAD with SBA.  Pt will complete UB dressing with SBA.  Pt will complete LB dressing with SBA using AE  Pt will complete toileting with SBA using LRAD.  Pt will complete functional mobility to/from toilet and toilet transfer with SBA using LRAD.                          Time Tracking:     OT Date of  Treatment: 10/06/23  OT Start Time: 1032  OT Stop Time: 1112  OT Total Time (min): 40 min    Billable Minutes:Self Care/Home Management 40    OT/JOSE: JOSE     Number of JOSE visits since last OT visit: 2    10/6/2023

## 2023-10-06 NOTE — PROGRESS NOTES
"   Trauma Surgery   Progress Note  Admit Date: 9/23/2023  HD#13  POD#7 Days Post-Op    Subjective:   Interval history:  ELI SILVEIRA  Had a small BM yesterday, passing flatus  UOP appropriate  Tolerating regular diet without n/v  WBC downtrending, 13 today  PT recommending rehab vs. Home with     Home Meds:   Current Outpatient Medications   Medication Instructions    dextroamphetamine-amphetamine 30 mg Tab 1 tablet, Oral, Daily    pantoprazole (PROTONIX) 40 MG tablet 1 tablet, Oral, Daily      Scheduled Meds:   calcium-vitamin D3  1 tablet Oral BID    docusate  100 mg Oral BID    enoxparin  40 mg Subcutaneous Q12H (prophylaxis, 0900/2100)    folic acid  1 mg Oral Daily    gabapentin  300 mg Oral TID    multivitamin  1 tablet Oral Daily    pantoprazole  40 mg Oral Daily    polyethylene glycol  17 g Oral BID    thiamine  100 mg Oral Daily     Continuous Infusions:      PRN Meds:heparin, porcine (PF), melatonin, metoclopramide HCl, midazolam, ondansetron, oxyCODONE, promethazine     Objective:     VITAL SIGNS: 24 HR MIN & MAX LAST   Temp  Min: 97.7 °F (36.5 °C)  Max: 98.6 °F (37 °C)  97.7 °F (36.5 °C)   BP  Min: 100/69  Max: 112/74  110/79    Pulse  Min: 83  Max: 97  93    Resp  Min: 18  Max: 20  18    SpO2  Min: 94 %  Max: 99 %  99 %      HT: 6' 2.02" (188 cm)  WT: 93.9 kg (207 lb)  BMI: 26.6     Intake/output:  Intake/Output - Last 3 Shifts         10/04 0700  10/05 0659 10/05 0700  10/06 0659 10/06 0700  10/07 0659    P.O. 960 960     I.V. (mL/kg) 988.8 (10.5)      IV Piggyback       Total Intake(mL/kg) 1948.8 (20.8) 960 (10.2)     Urine (mL/kg/hr) 1650 (0.7) 1500 (0.7)     Total Output 1650 1500     Net +298.8 -540                    Intake/Output Summary (Last 24 hours) at 10/6/2023 1240  Last data filed at 10/5/2023 2300  Gross per 24 hour   Intake 480 ml   Output 1200 ml   Net -720 ml         Lines/drains/airway:       Peripheral IV - Single Lumen 09/26/23 2200 Anterior;Left Forearm (Active)   Site Assessment " Clean;Dry;Intact;No redness;No swelling;No warmth;No drainage 10/01/23 0400   Extremity Assessment Distal to IV No abnormal discoloration 10/01/23 0400   Line Status Infusing 10/01/23 0400   Dressing Status Clean;Dry;Intact 10/01/23 0400   Dressing Intervention Integrity maintained 10/01/23 0400   Number of days: 4            Peripheral IV - Single Lumen 09/27/23 0728 18 G Left Hand (Active)   Site Assessment Clean;Dry;Intact;No redness;No swelling;No warmth;No drainage 10/01/23 0400   Extremity Assessment Distal to IV No abnormal discoloration 10/01/23 0400   Line Status Saline locked 10/01/23 0400   Dressing Status Clean;Dry;Intact 10/01/23 0400   Dressing Intervention Integrity maintained 10/01/23 0400   Number of days: 4            NG/OG Tube 09/27/23 1730 nasogastric Left nostril (Active)   Placement Check placement verified by aspirate characteristics 10/01/23 0400   Distal Tube Length (cm) 53 10/01/23 0000   Tolerance no signs/symptoms of discomfort 10/01/23 0400   Securement secured to nostril center w/ adhesive device 10/01/23 0400   Clamp Status/Tolerance unclamped;no emesis;no nausea;no restlessness 10/01/23 0400   Suction Setting/Drainage Method suction at;low;intermittent setting 10/01/23 0400   Insertion Site Appearance no redness, warmth, tenderness, skin breakdown, drainage 10/01/23 0400   Drainage Bile;Green 10/01/23 0400   Flush/Irrigation flushed w/;water;no resistance met 09/29/23 2000   Tube Output(mL)(Include Discarded Residual) 250 mL 10/01/23 0528   Number of days: 3       Male External Urinary Catheter 09/30/23 0500 (Active)   Collection Container Urimeter 10/01/23 0400   Securement Method secured to top of thigh w/ adhesive device 10/01/23 0400   Skin no redness;no breakdown;penis/scrotum cleansed w/ soap and water 10/01/23 0400   Tolerance no signs/symptoms of discomfort;assisted with appliance change 10/01/23 0400   Output (mL) 100 mL 10/01/23 0528   Catheter Change Date 09/30/23 10/01/23  "0400   Catheter Change Time 0430 10/01/23 0400   Number of days: 1       Physical examination:  Gen: NAD, AAOx3, answering questions appropriately  HEENT: normocephalic, atraumatic, EOMI, PERRL  CV: RR  Resp: NWOB  Abd: S/distended, nontender  Msk: moving all extremities spontaneously and purposefully- reduced to RUE/RLE. Ortho dressings removed  Neuro: CN II-XII grossly intact GCS 15  Skin/wounds: warm dry, dressings to RUE RLE    Labs:  Renal:  Recent Labs     10/04/23  0346 10/05/23  0412 10/06/23  0356   BUN 11.0 8.1* 7.7*   CREATININE 0.61* 0.61* 0.65*     No results for input(s): "LACTIC" in the last 72 hours.  FEN/GI:  Recent Labs     10/04/23  0346 10/05/23  0412 10/06/23  0356   * 132* 134*   K 2.9* 3.2* 4.2   CO2 26 25 26   CALCIUM 7.9* 7.9* 8.1*   MG 1.60 1.60 1.80   PHOS 3.6 3.1 3.9   ALBUMIN 2.5* 2.5* 2.7*   BILITOT 0.7 0.9 1.0   AST 28 43* 75*   ALKPHOS 54 70 105   ALT 20 32 58*     Heme:  Recent Labs     10/04/23  0346 10/05/23  0412 10/06/23  0356   HGB 8.3* 8.3* 8.5*   HCT 24.1* 24.4* 24.9*   * 579* 622*     ID:  Recent Labs     10/05/23  0412 10/06/23  0356   WBC 14.61  14.61* 13.6  13.60*     CBG:  Recent Labs     10/04/23  0346 10/05/23  0412 10/06/23  0356   GLUCOSE 100 126* 112*      No results for input(s): "POCTGLUCOSE" in the last 72 hours.   Cardiovascular:  No results for input(s): "TROPONINI", "CKTOTAL", "CKMB", "BNP" in the last 168 hours.  I have reviewed all pertinent lab results within the past 24 hours.    Imaging:  CT Abdomen Pelvis With Contrast   Final Result      1. Moderate dilatation of small-bowel loops with gradual transition to normal caliber small bowel distally.  Favor ileus over obstruction.   2. Fluid-filled colon which could be seen with a mild colitis or other diarrheal illness.   3. Enteric tube tip just past the GE junction.  Suggest advancing at least 5 cm if possible.   4. Other findings above.         Electronically signed by: Ritchie Coker "   Date:    10/02/2023   Time:    13:04      X-Ray Abdomen AP 1 View   Final Result      As above.         Electronically signed by: Stevo Thorne   Date:    10/01/2023   Time:    09:36      X-Ray Hip 1 View Right (with Pelvis when performed)   Final Result      Expected postoperative changes.         Electronically signed by: Stevo Thorne   Date:    09/29/2023   Time:    18:23      X-Ray Chest 1 View   Final Result      XR Gastric tube check, non-radiologist performed   Final Result      Nasogastric tube terminates within the gastric fundus.         Electronically signed by: Desmond Villa   Date:    09/27/2023   Time:    17:51      SURG FL Surgery Fluoro Usage   Final Result      X-Ray Abdomen AP 1 View   Final Result      Progressive gaseous distension of the bowel.  Consider ileus versus distal obstruction.         Electronically signed by: Kathie Atwood   Date:    09/27/2023   Time:    06:42      MRI Lumbar Spine Without Contrast   Final Result      1. L3 vertebral body superior half acute compression deformity with paraspinal soft inflammations.  Combination retropulsed bony fragment and ventral epidural hematoma results in central canal stenosis.      2. Lumbar degenerative disc disease and spondylosis level by level discussed above.         Electronically signed by: Desmond Villa   Date:    09/23/2023   Time:    15:40      MRI Thoracic Spine Without Contrast   Final Result      No evidence for acute osseous finding or static listhesis      No significant herniation or bulge.  No canal, cord, or foraminal compromise      Mild thoracic spondylosis.         Electronically signed by: Jose Antonio Mak MD   Date:    09/23/2023   Time:    15:33      CT Ankle (Including Hindfoot) Without Contrast Right   Final Result      Calcaneus fracture as described above.         Electronically signed by: Jose Antonio Mak MD   Date:    09/23/2023   Time:    14:21      X-Ray Hand 3 view Left   Final Result      No acute osseous  abnormality identified.         Electronically signed by: Desmond Villa   Date:    09/23/2023   Time:    13:53      X-Ray Knee Complete 4 or More Views Left   Final Result      No acute osseous abnormality identified.         Electronically signed by: Desmond Villa   Date:    09/23/2023   Time:    13:51      Xray Previous   Final Result      Xray Previous   Final Result      Xray Previous   Final Result      CT Previous   Final Result      CT Previous   Final Result      CT Previous   Final Result      CT Previous   Final Result      X-Ray Pelvis Routine AP   Final Result      Right subcapital femoral neck fracture.         Electronically signed by: Jose Antonio Mak MD   Date:    09/23/2023   Time:    12:31      X-Ray Femur Ap/Lat Right   Final Result      Right subcapital femoral neck fracture.         Electronically signed by: Jose Antonio Mak MD   Date:    09/23/2023   Time:    12:27      X-Ray Wrist Complete Right   Final Result      Mildly displaced dorsal triquetral fracture.      Nondisplaced distal radius fracture.         Electronically signed by: Jose Antonio Mak MD   Date:    09/23/2023   Time:    11:45      X-Ray Ankle Complete Right   Final Result      Fractured calcaneus.         Electronically signed by: Desmond Villa   Date:    09/23/2023   Time:    11:45         I have reviewed all pertinent imaging results/findings within the past 24 hours.    Micro/Path/Other:  Microbiology Results (last 7 days)       Procedure Component Value Units Date/Time    Blood Culture [[id number]]  (Normal) Collected: 10/03/23 0824    Order Status: Completed Specimen: Blood Updated: 10/06/23 1001     CULTURE, BLOOD ([facility]) No Growth At 72 Hours           Specimen (168h ago, onward)       Start     Ordered    09/29/23 1655  Specimen to Pathology  RELEASE UPON ORDERING        References:    Click here for ordering Quick Tip   Question:  Release to patient  Answer:  Immediate    09/29/23 5597                     Problems list:  Active  Problem List with Overview Notes    Diagnosis Date Noted    Ileus 10/03/2023    Closed nondisplaced fracture of right calcaneus 09/24/2023    Closed fracture of right femur 09/24/2023    Epidural hematoma 09/24/2023    Spinal stenosis of lumbar region 09/24/2023    Closed fracture of third lumbar vertebra 09/23/2023    Radius fracture 09/23/2023    Closed right hip fracture 09/23/2023    Displaced fracture of body of right talus, initial encounter for closed fracture 09/23/2023        Assessment & Plan:   47 y.o. male admitted on 9/23/2023 following  fell or slept wall fall from a balcony of approximally 12th and 13th feet.  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      Consults:   Neurosurgery  Orthopedic surgery   Therapy:  Physical Therapy  Occupational Therapy Weight bearing status:   Will verify WB status with Ortho  Precautions:  Fall and Spinal   Seizure prophylaxis:  Not indicated. VTE prophylaxis:     Prophylactic Lovenox 40mg BID GI prophylaxis:  H2B   Outpatient follow up:  Orthopedic surgery  Neurosurgery  Disposition:  Pending progress with therapy      -Tolerating regular diet  -Ambulating well with PT, recommending rehab vs. Home with HH  -Case management assisting with medicaid approval  -WBC down-trending to 13 today.  -UA was normal, Bcx NG at 72 hrs.   -Daily labs, replace lytes as necessary  - PO and IV pain meds  - Stop IVF  - transition to PO PPI daily  - Lovenox for DVT prophylaxis    Que Pérez MD  General Surgery

## 2023-10-06 NOTE — PROGRESS NOTES
"[facility] Neuro  Orthopedics  Progress Note    Patient Name: José Henry  MRN: [id number]  Admission Date: 9/23/2023  Hospital Length of Stay: 13 days  Attending Provider: Ehsan Martinez MD  Primary Care Provider: Brayden Welsh MD  Follow-up For: Procedure(s) (LRB):  ARTHROPLASTY, HIP (Right)    Post-Operative Day: 7 Days Post-Op  Subjective:     Principal Problem:<principal problem not specified>    Principal Orthopedic Problem: 7 Days Post-Op   Right calcaneus fracture and right distal radius fracture    Interval History:    Patient doing well overall. Splint removed today. Skin without breakdown. We will start floating his heel today. He has an exos brace in place to the right wrist. He is neurovascularly intact. To the RUE and RLE. Overall he is very happy   Review of patient's allergies indicates:  No Known Allergies    Current Facility-Administered Medications   Medication    calcium-vitamin D3 500 mg-5 mcg (200 unit) per tablet 1 tablet    docusate 50 mg/5 mL liquid 100 mg    enoxaparin injection 40 mg    folic acid tablet 1 mg    gabapentin capsule 300 mg    heparin, porcine (PF) 100 unit/mL injection flush 500 Units    melatonin tablet 6 mg    metoclopramide HCl injection 10 mg    midazolam (VERSED) 1 mg/mL injection 2 mg    multivitamin tablet    ondansetron injection 4 mg    oxyCODONE immediate release tablet Tab 10 mg    pantoprazole EC tablet 40 mg    polyethylene glycol packet 17 g    promethazine injection 25 mg    thiamine tablet 100 mg     Objective:     Vital Signs (Most Recent):  Temp: 97.7 °F (36.5 °C) (10/06/23 1100)  Pulse: 93 (10/06/23 0711)  Resp: 18 (10/06/23 1100)  BP: 110/79 (10/06/23 1100)  SpO2: 99 % (10/06/23 1100) Vital Signs (24h Range):  Temp:  [97.7 °F (36.5 °C)-98.6 °F (37 °C)] 97.7 °F (36.5 °C)  Pulse:  [] 93  Resp:  [18-20] 18  SpO2:  [93 %-99 %] 99 %  BP: (100-117)/(68-81) 110/79     Weight: 93.9 kg (207 lb)  Height: 6' 2.02" (188 cm)  Body " mass index is 26.57 kg/m².      Intake/Output Summary (Last 24 hours) at 10/6/2023 1131  Last data filed at 10/5/2023 2300  Gross per 24 hour   Intake 480 ml   Output 1200 ml   Net -720 ml       Physical Exam:   Musculoskeletal:     Right upper extremity: Splint CDI; compartments are soft and compressible; no pain with ROM at the shoulder, elbow or digits,no wrist ROM attempted; appropriate tenderness to palpation; AIN/PIN/Ulnar motor intact; SILT distally;BCR distally; Radial pulse 2+    Right lower extremity: Splint removed, Skin intact to the heel. Mild ecchymosis laterally; compartments are soft and compressible; FROM ankle and digits; moderate tenderness to palpation;small palpable bony prominence without skin breakdown to the posterior foot dorsi/plantar flexes the foot; SILT distally; BCR distally; DP pulse palpable        Diagnostic Findings:   Significant Labs:   Recent Lab Results         10/06/23  0356   10/05/23  0412   10/04/23  0346   10/03/23  1447        Albumin/Globulin Ratio 1.2   1.0   1.0         Albumin 2.7   2.5   2.5            70   54         ALT 58   32   20         Appearance, UA       Clear       AST 75   43   28         Bacteria, UA       Trace       BILIRUBIN TOTAL 1.0   0.9   0.7         Bilirubin, UA       Negative       BUN 7.7   8.1   11.0         Calcium 8.1   7.9   7.9         Chloride 101   99   98         CO2 26   25   26         Color, UA       Dark Yellow       Creatinine 0.65   0.61   0.61         CRP   66.70           eGFR >60   >60   >60         Eos # 0.136   0.2922   0.5992         Eosinophil % 1   2   4         Globulin, Total 2.2   2.6   2.5         Glucose 112   126   100         Glucose, UA       Negative       Gran # (ANC) 9.928   10.5192   9.5872         Hematocrit 24.9   24.4   24.1         Hemoglobin 8.5   8.3   8.3         Ketones, UA       4+       Leukocytes, UA       Negative       Lymph # 2.312   1.8993   2.3968         Lymphs % 17   13   16          Macrocytosis   1+           Magnesium  1.80   1.60   1.60         MCH 29.9   29.5   29.6         MCHC 34.1   34.0   34.4         MCV 87.7   86.8   86.1         Mono # 1.36   1.6071   2.0972         Mono % 10   11   14         MPV 9.2   9.1   9.6         Mucous, UA       Small       Myelocytes   1   2         Neutrophils Relative 73   72   64         NITRITE UA       Negative       nRBC 0.0   0.0   0.0         Occult Blood UA       Negative       pH, UA       8.0       Phosphorus Level 3.9   3.1   3.6         Platelet Estimate Increased   Increased   Increased         Platelet Count 622   579   495         Poly   1+           Potassium 4.2   3.2   2.9         Prealbumin   11.6           PROTEIN TOTAL 4.9   5.1   5.0         Protein, UA       Trace       RBC 2.84   2.81   2.80         RBC Morph Normal   Abnormal   Normal         RBC, UA       3-5       RDW 14.1   13.6   13.8         Sodium 134   132   134         Specific Gravity,UA       1.024       Squam Epithel, UA       0-4       Urobilinogen, UA       1.0       WBC, UA       0-2       WBC 13.6   14.61   14.98          13.60   14.61   14.98                  Significant Imaging: I have reviewed and interpreted all pertinent imaging results/findings.     Assessment/Plan:     Active Diagnoses:    Diagnosis Date Noted POA    Ileus [K56.7] 10/03/2023 Yes    Closed nondisplaced fracture of right calcaneus [S92.001A] 09/24/2023 Yes    Closed fracture of right femur [S72.91XA] 09/24/2023 Yes    Epidural hematoma [S06.4XAA] 09/24/2023 Yes    Spinal stenosis of lumbar region [M48.061] 09/24/2023 Yes    Closed fracture of third lumbar vertebra [S32.039A] 09/23/2023 Yes    Radius fracture [S52.90XA] 09/23/2023 Yes    Closed right hip fracture [S72.001A] 09/23/2023 Yes    Displaced fracture of body of right talus, initial encounter for closed fracture [S92.121A] 09/23/2023 Yes      Problems Resolved During this Admission:   Labs stable. Will continue to monitor.   Regarding  hip. WB per Dr. Vallejo. Continue to work with therapy.  NWB to the RLE due to calc and talus fracture. NWB right wrist, okay for platform walker.   CAM boot ordered and in room. Would like to float heel. Boot when up working with therapy, may remove while in bed. Strict NWB. Discussed nicotine cessation and risk of non-union with nicotine use.  Will continue to follow through his stay.   Follow up in office with Dr. Stover in approx 3 weeks for management of his wrist fracture and calcaneus fracture.    The above findings, diagnostics, and treatment plan were discussed with Dr. Stover who is in agreement with the plan of care except as stated in additional documentation.      Berenice Hough PA-C  Orthopedic Trauma Surgery  [facility]

## 2023-10-06 NOTE — PT/OT/SLP RE-EVAL
Physical Therapy Re-Evaluation    Patient Name:  José Henry   MRN:  [id number]    Recommendations:     Discharge Recommendations: rehabilitation facility   Discharge Equipment Recommendations: platform, walker, rolling, bath bench, bedside commode   Barriers to discharge: Impaired mobility and Ongoing medical needs    Assessment:     José Henry is a 47 y.o. male admitted with a medical diagnosis of <principal problem not specified>.  He presents with the following impairments/functional limitations: weakness, impaired endurance, impaired self care skills, impaired functional mobility, gait instability, impaired balance, decreased upper extremity function, decreased lower extremity function, decreased safety awareness, pain, orthopedic precautions. Patient making progress with PT. MIN A - MOD A for all mobility. Strongly recommend rehab placement as patient will not have the necessary assistance at home. Progress as tolerated.     Rehab Prognosis: Good; patient would benefit from acute skilled PT services to address these deficits and reach maximum level of function.    Recent Surgery: Procedure(s) (LRB):  ARTHROPLASTY, HIP (Right) 7 Days Post-Op    Plan:     During this hospitalization, patient to be seen 6 x/week to address the identified rehab impairments via gait training, therapeutic activities, therapeutic exercises, neuromuscular re-education and progress toward the following goals:    Plan of Care Expires:  11/01/23    Subjective     Chief Complaint: pain  Patient/Family Comments/goals: none  Pain/Comfort:  Pain Rating 1: 5/10  Location - Side 1: Right  Location 1: hip  Pain Addressed 1: Reposition, Distraction  Pain Rating Post-Intervention 1: 5/10    Patients cultural, spiritual, Anabaptist conflicts given the current situation: no    Objective:     Communicated with nurse prior to session.  Patient found HOB elevated with peripheral IV, telemetry  upon PT entry to room.    General Precautions:  Standard, fall  Orthopedic Precautions:spinal precautions, RLE posterior precautions, RLE non weight bearing, RUE non weight bearing   Braces: TLSO, CAM boot  Respiratory Status: Room air    Exams:  Cognitive Exam:  Patient is oriented to Person, Place, Time, and Situation  Sensation: -       Intact  RLE ROM: Hip precautions. CAM boot  RLE Strength: -3/5 grossly  LLE ROM: WFL  LLE Strength: WFL  Skin integrity: Visible skin intact      Functional Mobility:  Bed Mobility:     Supine to Sit: minimum assistance  Sit to Supine: minimum assistance  Transfers:  Sit to Stand:  minimum assistance and moderate assistance with platform walker  Gait: 105 ft with platform walker and CGA. Multiple standing rest breaks as he fatigues fast.   Balance: fair      AM-PAC 6 CLICK MOBILITY  Total Score:16     Education Provided:  Role and goals of PT, transfer training, bed mobility, gait training, balance training, safety awareness, assistive device, strengthening exercises, and importance of participating in PT to return to PLOF.    Patient left HOB elevated with all lines intact and call button in reach.    GOALS:   Multidisciplinary Problems       Physical Therapy Goals          Problem: Physical Therapy    Goal Priority Disciplines Outcome Goal Variances Interventions   Physical Therapy Goal     PT, PT/OT Ongoing, Progressing     Description: Goals to be met by: 2023     Patient will increase functional independence with mobility by performin. Supine to sit with Modified [place]  2. Sit to stand transfer with Modified [place]  3. Gait  x 150 feet with Modified [place] using platform RW.                          History:     Past Medical History:   Diagnosis Date    GERD (gastroesophageal reflux disease)     Smoker        Past Surgical History:   Procedure Laterality Date    HIP ARTHROPLASTY Right 2023    Procedure: ARTHROPLASTY, HIP;  Surgeon: Tai Vallejo MD;  [facility];  Service:  Orthopedics;  Laterality: Right;  REQ. 12:30PM    POSTERIOR LUMBAR INTERBODY FUSION (PLIF) WITH COMPUTER-ASSISTED NAVIGATION N/A 9/27/2023    Procedure: FUSION, SPINE, LUMBAR, PLIF, USING COMPUTER-ASSISTED NAVIGATION;  Surgeon: Kiera Diaz MD;  Location: [facility];  Service: Neurosurgery;  Laterality: N/A;  L1-L5 fixation // L2-L3 laminectomy // NTI // medtronic // O-arm       Time Tracking:     PT Received On: 10/06/23  PT Start Time: 1352     PT Stop Time: 1412  PT Total Time (min): 20 min     Billable Minutes: Re-eval 20 minutes      10/06/2023

## 2023-10-06 NOTE — PLAN OF CARE
Pts medicaid still pending. Pt updates me that his sister is working with [place] the financial counselor and has turned in the last piece of info needed for the application process to his understanding.   We discussed if going home is an option. Pt tells me he would have to go to his home that is two levels with a sunken living room and all the bedrooms upstairs. He lives with his 16 year old and 18 year old who are both in school and work. He would not have help at home.   Pt is getting up here with a right platform walker and is assisted to the toilet where their are supports.   Therapy has recommended platform, walker, rolling, bath bench, bedside commode . They also recommend inpt rehab.   Pt tells me he would need all those pieces of equipment, hospital bed and help at home.   He has no funding for these items.   He has no help at home.   Will continue to follow for funding through medicaid

## 2023-10-06 NOTE — PLAN OF CARE
Problem: Physical Therapy  Goal: Physical Therapy Goal  Description: Goals to be met by: 2023     Patient will increase functional independence with mobility by performin. Supine to sit with Modified [place]  2. Sit to stand transfer with Modified [place]  3. Gait  x 150 feet with Modified [place] using platform RW.     Outcome: Ongoing, Progressing

## 2023-10-07 PROCEDURE — 25000003 PHARM REV CODE 250: Performed by: NURSE PRACTITIONER

## 2023-10-07 PROCEDURE — 63600175 PHARM REV CODE 636 W HCPCS: Performed by: NURSE PRACTITIONER

## 2023-10-07 PROCEDURE — 11000001 HC ACUTE MED/SURG PRIVATE ROOM

## 2023-10-07 PROCEDURE — 21400001 HC TELEMETRY ROOM

## 2023-10-07 RX ORDER — BISACODYL 10 MG
10 SUPPOSITORY, RECTAL RECTAL DAILY PRN
Status: DISCONTINUED | OUTPATIENT
Start: 2023-10-07 | End: 2023-10-16 | Stop reason: HOSPADM

## 2023-10-07 RX ADMIN — DOCUSATE SODIUM 100 MG: 50 LIQUID ORAL at 08:10

## 2023-10-07 RX ADMIN — GABAPENTIN 300 MG: 300 CAPSULE ORAL at 12:10

## 2023-10-07 RX ADMIN — FOLIC ACID 1 MG: 1 TABLET ORAL at 09:10

## 2023-10-07 RX ADMIN — Medication 6 MG: at 09:10

## 2023-10-07 RX ADMIN — POLYETHYLENE GLYCOL 3350 17 G: 17 POWDER, FOR SOLUTION ORAL at 08:10

## 2023-10-07 RX ADMIN — THERA TABS 1 TABLET: TAB at 08:10

## 2023-10-07 RX ADMIN — ENOXAPARIN SODIUM 40 MG: 40 INJECTION SUBCUTANEOUS at 08:10

## 2023-10-07 RX ADMIN — Medication 1 TABLET: at 09:10

## 2023-10-07 RX ADMIN — OXYCODONE HYDROCHLORIDE 10 MG: 10 TABLET ORAL at 09:10

## 2023-10-07 RX ADMIN — GABAPENTIN 300 MG: 300 CAPSULE ORAL at 09:10

## 2023-10-07 RX ADMIN — OXYCODONE HYDROCHLORIDE 10 MG: 10 TABLET ORAL at 05:10

## 2023-10-07 RX ADMIN — POLYETHYLENE GLYCOL 3350 17 G: 17 POWDER, FOR SOLUTION ORAL at 09:10

## 2023-10-07 RX ADMIN — OXYCODONE HYDROCHLORIDE 10 MG: 10 TABLET ORAL at 03:10

## 2023-10-07 RX ADMIN — PANTOPRAZOLE SODIUM 40 MG: 40 TABLET, DELAYED RELEASE ORAL at 09:10

## 2023-10-07 RX ADMIN — ENOXAPARIN SODIUM 40 MG: 40 INJECTION SUBCUTANEOUS at 09:10

## 2023-10-07 RX ADMIN — THIAMINE HCL TAB 100 MG 100 MG: 100 TAB at 08:10

## 2023-10-07 RX ADMIN — DOCUSATE SODIUM 100 MG: 50 LIQUID ORAL at 09:10

## 2023-10-07 RX ADMIN — OXYCODONE HYDROCHLORIDE 10 MG: 10 TABLET ORAL at 12:10

## 2023-10-07 NOTE — PROGRESS NOTES
"   Trauma Surgery   Progress Note  Admit Date: 9/23/2023  HD#14  POD#8 Days Post-Op    Subjective:   Interval history:  NAEON  AFVSS  Patient is having Bms, and passing flatus  UOP appropriate  Tolerating regular diet without n/v  PT recommending rehab vs. Home with HH    Home Meds:   Current Outpatient Medications   Medication Instructions    dextroamphetamine-amphetamine 30 mg Tab 1 tablet, Oral, Daily    pantoprazole (PROTONIX) 40 MG tablet 1 tablet, Oral, Daily      Scheduled Meds:   calcium-vitamin D3  1 tablet Oral BID    docusate  100 mg Oral BID    enoxparin  40 mg Subcutaneous Q12H (prophylaxis, 0900/2100)    folic acid  1 mg Oral Daily    gabapentin  300 mg Oral TID    multivitamin  1 tablet Oral Daily    pantoprazole  40 mg Oral Daily    polyethylene glycol  17 g Oral BID    thiamine  100 mg Oral Daily     Continuous Infusions:      PRN Meds:heparin, porcine (PF), melatonin, metoclopramide HCl, midazolam, ondansetron, oxyCODONE, promethazine     Objective:     VITAL SIGNS: 24 HR MIN & MAX LAST   Temp  Min: 97.7 °F (36.5 °C)  Max: 99.1 °F (37.3 °C)  98.1 °F (36.7 °C)   BP  Min: 100/69  Max: 119/81  116/77    Pulse  Min: 88  Max: 102  88    Resp  Min: 18  Max: 18  18    SpO2  Min: 94 %  Max: 99 %  98 %      HT: 6' 2.02" (188 cm)  WT: 93.9 kg (207 lb)  BMI: 26.6     Intake/output:  Intake/Output - Last 3 Shifts         10/05 0700  10/06 0659 10/06 0700  10/07 0659    P.O. 960 580    Total Intake(mL/kg) 960 (10.2) 580 (6.2)    Urine (mL/kg/hr) 1500 (0.7) 900 (0.4)    Total Output 1500 900    Net -540 -320          Urine Occurrence  1 x            Intake/Output Summary (Last 24 hours) at 10/7/2023 0646  Last data filed at 10/7/2023 0113  Gross per 24 hour   Intake 580 ml   Output 900 ml   Net -320 ml         Lines/drains/airway:       Peripheral IV - Single Lumen 09/26/23 2200 Anterior;Left Forearm (Active)   Site Assessment Clean;Dry;Intact;No redness;No swelling;No warmth;No drainage 10/01/23 8690 "   Extremity Assessment Distal to IV No abnormal discoloration 10/01/23 0400   Line Status Infusing 10/01/23 0400   Dressing Status Clean;Dry;Intact 10/01/23 0400   Dressing Intervention Integrity maintained 10/01/23 0400   Number of days: 4            Peripheral IV - Single Lumen 09/27/23 0728 18 G Left Hand (Active)   Site Assessment Clean;Dry;Intact;No redness;No swelling;No warmth;No drainage 10/01/23 0400   Extremity Assessment Distal to IV No abnormal discoloration 10/01/23 0400   Line Status Saline locked 10/01/23 0400   Dressing Status Clean;Dry;Intact 10/01/23 0400   Dressing Intervention Integrity maintained 10/01/23 0400   Number of days: 4            NG/OG Tube 09/27/23 1730 nasogastric Left nostril (Active)   Placement Check placement verified by aspirate characteristics 10/01/23 0400   Distal Tube Length (cm) 53 10/01/23 0000   Tolerance no signs/symptoms of discomfort 10/01/23 0400   Securement secured to nostril center w/ adhesive device 10/01/23 0400   Clamp Status/Tolerance unclamped;no emesis;no nausea;no restlessness 10/01/23 0400   Suction Setting/Drainage Method suction at;low;intermittent setting 10/01/23 0400   Insertion Site Appearance no redness, warmth, tenderness, skin breakdown, drainage 10/01/23 0400   Drainage Bile;Green 10/01/23 0400   Flush/Irrigation flushed w/;water;no resistance met 09/29/23 2000   Tube Output(mL)(Include Discarded Residual) 250 mL 10/01/23 0528   Number of days: 3       Male External Urinary Catheter 09/30/23 0500 (Active)   Collection Container Urimeter 10/01/23 0400   Securement Method secured to top of thigh w/ adhesive device 10/01/23 0400   Skin no redness;no breakdown;penis/scrotum cleansed w/ soap and water 10/01/23 0400   Tolerance no signs/symptoms of discomfort;assisted with appliance change 10/01/23 0400   Output (mL) 100 mL 10/01/23 0528   Catheter Change Date 09/30/23 10/01/23 0400   Catheter Change Time 0430 10/01/23 0400   Number of days: 1  "      Physical examination:  Gen: NAD, AAOx3, answering questions appropriately  HEENT: normocephalic, atraumatic, EOMI, PERRL  CV: RR  Resp: NWOB  Abd: S/distended, nontender  Msk: moving all extremities spontaneously and purposefully- reduced to RUE/RLE. Ortho dressings removed  Neuro: CN II-XII grossly intact GCS 15  Skin/wounds: warm dry, dressings to RUE RLE    Labs:  Renal:  Recent Labs     10/05/23  0412 10/06/23  0356   BUN 8.1* 7.7*   CREATININE 0.61* 0.65*     No results for input(s): "LACTIC" in the last 72 hours.  FEN/GI:  Recent Labs     10/05/23  0412 10/06/23  0356   * 134*   K 3.2* 4.2   CO2 25 26   CALCIUM 7.9* 8.1*   MG 1.60 1.80   PHOS 3.1 3.9   ALBUMIN 2.5* 2.7*   BILITOT 0.9 1.0   AST 43* 75*   ALKPHOS 70 105   ALT 32 58*     Heme:  Recent Labs     10/05/23  0412 10/06/23  0356   HGB 8.3* 8.5*   HCT 24.4* 24.9*   * 622*     ID:  Recent Labs     10/05/23  0412 10/06/23  0356   WBC 14.61  14.61* 13.6  13.60*     CBG:  Recent Labs     10/05/23  0412 10/06/23  0356   GLUCOSE 126* 112*      No results for input(s): "POCTGLUCOSE" in the last 72 hours.   Cardiovascular:  No results for input(s): "TROPONINI", "CKTOTAL", "CKMB", "BNP" in the last 168 hours.  I have reviewed all pertinent lab results within the past 24 hours.    Imaging:  CT Abdomen Pelvis With Contrast   Final Result      1. Moderate dilatation of small-bowel loops with gradual transition to normal caliber small bowel distally.  Favor ileus over obstruction.   2. Fluid-filled colon which could be seen with a mild colitis or other diarrheal illness.   3. Enteric tube tip just past the GE junction.  Suggest advancing at least 5 cm if possible.   4. Other findings above.         Electronically signed by: Ritchie Coker   Date:    10/02/2023   Time:    13:04      X-Ray Abdomen AP 1 View   Final Result      As above.         Electronically signed by: Stevo Thorne   Date:    10/01/2023   Time:    09:36      X-Ray Hip 1 View " Right (with Pelvis when performed)   Final Result      Expected postoperative changes.         Electronically signed by: Stevo Thorne   Date:    09/29/2023   Time:    18:23      X-Ray Chest 1 View   Final Result      XR Gastric tube check, non-radiologist performed   Final Result      Nasogastric tube terminates within the gastric fundus.         Electronically signed by: Desmond Villa   Date:    09/27/2023   Time:    17:51      SURG FL Surgery Fluoro Usage   Final Result      X-Ray Abdomen AP 1 View   Final Result      Progressive gaseous distension of the bowel.  Consider ileus versus distal obstruction.         Electronically signed by: Kathie Atwood   Date:    09/27/2023   Time:    06:42      MRI Lumbar Spine Without Contrast   Final Result      1. L3 vertebral body superior half acute compression deformity with paraspinal soft inflammations.  Combination retropulsed bony fragment and ventral epidural hematoma results in central canal stenosis.      2. Lumbar degenerative disc disease and spondylosis level by level discussed above.         Electronically signed by: Desmond Villa   Date:    09/23/2023   Time:    15:40      MRI Thoracic Spine Without Contrast   Final Result      No evidence for acute osseous finding or static listhesis      No significant herniation or bulge.  No canal, cord, or foraminal compromise      Mild thoracic spondylosis.         Electronically signed by: Jose Antonio Mak MD   Date:    09/23/2023   Time:    15:33      CT Ankle (Including Hindfoot) Without Contrast Right   Final Result      Calcaneus fracture as described above.         Electronically signed by: Jose Antonio Mak MD   Date:    09/23/2023   Time:    14:21      X-Ray Hand 3 view Left   Final Result      No acute osseous abnormality identified.         Electronically signed by: Desmond Villa   Date:    09/23/2023   Time:    13:53      X-Ray Knee Complete 4 or More Views Left   Final Result      No acute osseous abnormality  identified.         Electronically signed by: Desmond Villa   Date:    09/23/2023   Time:    13:51      Xray Previous   Final Result      Xray Previous   Final Result      Xray Previous   Final Result      CT Previous   Final Result      CT Previous   Final Result      CT Previous   Final Result      CT Previous   Final Result      X-Ray Pelvis Routine AP   Final Result      Right subcapital femoral neck fracture.         Electronically signed by: Jose Antonio Mak MD   Date:    09/23/2023   Time:    12:31      X-Ray Femur Ap/Lat Right   Final Result      Right subcapital femoral neck fracture.         Electronically signed by: Jose Antonio Mak MD   Date:    09/23/2023   Time:    12:27      X-Ray Wrist Complete Right   Final Result      Mildly displaced dorsal triquetral fracture.      Nondisplaced distal radius fracture.         Electronically signed by: Jose Antonio Mak MD   Date:    09/23/2023   Time:    11:45      X-Ray Ankle Complete Right   Final Result      Fractured calcaneus.         Electronically signed by: Desmond Villa   Date:    09/23/2023   Time:    11:45         I have reviewed all pertinent imaging results/findings within the past 24 hours.    Micro/Path/Other:  Microbiology Results (last 7 days)       Procedure Component Value Units Date/Time    Blood Culture [[id number]]  (Normal) Collected: 10/03/23 0824    Order Status: Completed Specimen: Blood Updated: 10/06/23 1001     CULTURE, BLOOD ([facility]) No Growth At 72 Hours           Specimen (168h ago, onward)      None             Problems list:  Active Problem List with Overview Notes    Diagnosis Date Noted    Ileus 10/03/2023    Closed nondisplaced fracture of right calcaneus 09/24/2023    Closed fracture of right femur 09/24/2023    Epidural hematoma 09/24/2023    Spinal stenosis of lumbar region 09/24/2023    Closed fracture of third lumbar vertebra 09/23/2023    Radius fracture 09/23/2023    Closed right hip fracture 09/23/2023    Displaced fracture of  body of right talus, initial encounter for closed fracture 09/23/2023        Assessment & Plan:   47 y.o. male admitted on 9/23/2023 following  fell or slept wall fall from a balcony of approximally 12th and 13th feet.  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      Consults:   Neurosurgery  Orthopedic surgery   Therapy:  Physical Therapy  Occupational Therapy Weight bearing status:   Will verify WB status with Ortho  Precautions:  Fall and Spinal   Seizure prophylaxis:  Not indicated. VTE prophylaxis:     Prophylactic Lovenox 40mg BID GI prophylaxis:  H2B   Outpatient follow up:  Orthopedic surgery  Neurosurgery  Disposition:  Pending progress with therapy      -Tolerating regular diet  -Ambulating well with PT, recommending rehab vs. Home with HH  -Case management assisting with medicaid approval  -UA was normal, Bcx NG to date  - PO and IV pain meds  - transition to PO PPI daily  - Lovenox for DVT prophylaxis    Que Pérez MD  General Surgery

## 2023-10-08 LAB — BACTERIA BLD CULT: NORMAL

## 2023-10-08 PROCEDURE — 25000003 PHARM REV CODE 250: Performed by: NURSE PRACTITIONER

## 2023-10-08 PROCEDURE — 63600175 PHARM REV CODE 636 W HCPCS: Performed by: NURSE PRACTITIONER

## 2023-10-08 PROCEDURE — 94761 N-INVAS EAR/PLS OXIMETRY MLT: CPT

## 2023-10-08 PROCEDURE — 21400001 HC TELEMETRY ROOM

## 2023-10-08 PROCEDURE — 97116 GAIT TRAINING THERAPY: CPT | Mod: CQ

## 2023-10-08 PROCEDURE — 11000001 HC ACUTE MED/SURG PRIVATE ROOM

## 2023-10-08 PROCEDURE — 97530 THERAPEUTIC ACTIVITIES: CPT | Mod: CQ

## 2023-10-08 RX ADMIN — OXYCODONE HYDROCHLORIDE 10 MG: 10 TABLET ORAL at 02:10

## 2023-10-08 RX ADMIN — THIAMINE HCL TAB 100 MG 100 MG: 100 TAB at 09:10

## 2023-10-08 RX ADMIN — DOCUSATE SODIUM 100 MG: 50 LIQUID ORAL at 09:10

## 2023-10-08 RX ADMIN — THERA TABS 1 TABLET: TAB at 09:10

## 2023-10-08 RX ADMIN — Medication 1 TABLET: at 09:10

## 2023-10-08 RX ADMIN — POLYETHYLENE GLYCOL 3350 17 G: 17 POWDER, FOR SOLUTION ORAL at 09:10

## 2023-10-08 RX ADMIN — OXYCODONE HYDROCHLORIDE 10 MG: 10 TABLET ORAL at 09:10

## 2023-10-08 RX ADMIN — GABAPENTIN 300 MG: 300 CAPSULE ORAL at 09:10

## 2023-10-08 RX ADMIN — PANTOPRAZOLE SODIUM 40 MG: 40 TABLET, DELAYED RELEASE ORAL at 09:10

## 2023-10-08 RX ADMIN — OXYCODONE HYDROCHLORIDE 10 MG: 10 TABLET ORAL at 04:10

## 2023-10-08 RX ADMIN — ENOXAPARIN SODIUM 40 MG: 40 INJECTION SUBCUTANEOUS at 09:10

## 2023-10-08 RX ADMIN — OXYCODONE HYDROCHLORIDE 10 MG: 10 TABLET ORAL at 07:10

## 2023-10-08 RX ADMIN — OXYCODONE HYDROCHLORIDE 10 MG: 10 TABLET ORAL at 12:10

## 2023-10-08 RX ADMIN — GABAPENTIN 300 MG: 300 CAPSULE ORAL at 02:10

## 2023-10-08 RX ADMIN — FOLIC ACID 1 MG: 1 TABLET ORAL at 09:10

## 2023-10-08 RX ADMIN — Medication 6 MG: at 09:10

## 2023-10-08 NOTE — PROGRESS NOTES
"   Trauma Surgery   Progress Note  Admit Date: 9/23/2023  HD#15  POD#9 Days Post-Op    Subjective:   Interval history:  NAEON  AFVSS  Patient is having Bms, and passing flatus  UOP appropriate  Tolerating regular diet without n/v  PT recommending rehab vs. Home with HH    Home Meds:   Current Outpatient Medications   Medication Instructions    dextroamphetamine-amphetamine 30 mg Tab 1 tablet, Oral, Daily    pantoprazole (PROTONIX) 40 MG tablet 1 tablet, Oral, Daily      Scheduled Meds:   calcium-vitamin D3  1 tablet Oral BID    docusate  100 mg Oral BID    enoxparin  40 mg Subcutaneous Q12H (prophylaxis, 0900/2100)    folic acid  1 mg Oral Daily    gabapentin  300 mg Oral TID    multivitamin  1 tablet Oral Daily    pantoprazole  40 mg Oral Daily    polyethylene glycol  17 g Oral BID    thiamine  100 mg Oral Daily     Continuous Infusions:      PRN Meds:bisacodyL, heparin, porcine (PF), melatonin, metoclopramide HCl, midazolam, ondansetron, oxyCODONE, promethazine     Objective:     VITAL SIGNS: 24 HR MIN & MAX LAST   Temp  Min: 98.1 °F (36.7 °C)  Max: 98.9 °F (37.2 °C)  98.1 °F (36.7 °C)   BP  Min: 103/69  Max: 120/91  114/74    Pulse  Min: 87  Max: 103  91    Resp  Min: 17  Max: 18  18    SpO2  Min: 95 %  Max: 99 %  98 %      HT: 6' 2.02" (188 cm)  WT: 93.9 kg (207 lb)  BMI: 26.6     Intake/output:  Intake/Output - Last 3 Shifts         10/06 0700  10/07 0659 10/07 0700  10/08 0659 10/08 0700  10/09 0659    P.O. 580      Total Intake(mL/kg) 580 (6.2)      Urine (mL/kg/hr) 900 (0.4) 1000 (0.4) 750 (2.7)    Total Output 900 1000 750    Net -320 -1000 -750           Urine Occurrence 1 x      Stool Occurrence  1 x             Intake/Output Summary (Last 24 hours) at 10/8/2023 0958  Last data filed at 10/8/2023 0932  Gross per 24 hour   Intake --   Output 1750 ml   Net -1750 ml         Lines/drains/airway:       Peripheral IV - Single Lumen 09/26/23 2200 Anterior;Left Forearm (Active)   Site Assessment " Clean;Dry;Intact;No redness;No swelling;No warmth;No drainage 10/01/23 0400   Extremity Assessment Distal to IV No abnormal discoloration 10/01/23 0400   Line Status Infusing 10/01/23 0400   Dressing Status Clean;Dry;Intact 10/01/23 0400   Dressing Intervention Integrity maintained 10/01/23 0400   Number of days: 4            Peripheral IV - Single Lumen 09/27/23 0728 18 G Left Hand (Active)   Site Assessment Clean;Dry;Intact;No redness;No swelling;No warmth;No drainage 10/01/23 0400   Extremity Assessment Distal to IV No abnormal discoloration 10/01/23 0400   Line Status Saline locked 10/01/23 0400   Dressing Status Clean;Dry;Intact 10/01/23 0400   Dressing Intervention Integrity maintained 10/01/23 0400   Number of days: 4            NG/OG Tube 09/27/23 1730 nasogastric Left nostril (Active)   Placement Check placement verified by aspirate characteristics 10/01/23 0400   Distal Tube Length (cm) 53 10/01/23 0000   Tolerance no signs/symptoms of discomfort 10/01/23 0400   Securement secured to nostril center w/ adhesive device 10/01/23 0400   Clamp Status/Tolerance unclamped;no emesis;no nausea;no restlessness 10/01/23 0400   Suction Setting/Drainage Method suction at;low;intermittent setting 10/01/23 0400   Insertion Site Appearance no redness, warmth, tenderness, skin breakdown, drainage 10/01/23 0400   Drainage Bile;Green 10/01/23 0400   Flush/Irrigation flushed w/;water;no resistance met 09/29/23 2000   Tube Output(mL)(Include Discarded Residual) 250 mL 10/01/23 0528   Number of days: 3       Male External Urinary Catheter 09/30/23 0500 (Active)   Collection Container Urimeter 10/01/23 0400   Securement Method secured to top of thigh w/ adhesive device 10/01/23 0400   Skin no redness;no breakdown;penis/scrotum cleansed w/ soap and water 10/01/23 0400   Tolerance no signs/symptoms of discomfort;assisted with appliance change 10/01/23 0400   Output (mL) 100 mL 10/01/23 0528   Catheter Change Date 09/30/23 10/01/23  "0400   Catheter Change Time 0430 10/01/23 0400   Number of days: 1       Physical examination:  Gen: NAD, AAOx3, answering questions appropriately  HEENT: normocephalic, atraumatic, EOMI, PERRL  CV: RR  Resp: NWOB  Abd: S/distended, nontender  Msk: moving all extremities spontaneously and purposefully- reduced to RUE/RLE. Ortho dressings removed  Neuro: CN II-XII grossly intact GCS 15  Skin/wounds: warm dry, dressings to RUE RLE    Labs:  Renal:  Recent Labs     10/06/23  0356   BUN 7.7*   CREATININE 0.65*     No results for input(s): "LACTIC" in the last 72 hours.  FEN/GI:  Recent Labs     10/06/23  0356   *   K 4.2   CO2 26   CALCIUM 8.1*   MG 1.80   PHOS 3.9   ALBUMIN 2.7*   BILITOT 1.0   AST 75*   ALKPHOS 105   ALT 58*     Heme:  Recent Labs     10/06/23  0356   HGB 8.5*   HCT 24.9*   *     ID:  Recent Labs     10/06/23  0356   WBC 13.6  13.60*     CBG:  Recent Labs     10/06/23  0356   GLUCOSE 112*      No results for input(s): "POCTGLUCOSE" in the last 72 hours.   Cardiovascular:  No results for input(s): "TROPONINI", "CKTOTAL", "CKMB", "BNP" in the last 168 hours.  I have reviewed all pertinent lab results within the past 24 hours.    Imaging:  CT Abdomen Pelvis With Contrast   Final Result      1. Moderate dilatation of small-bowel loops with gradual transition to normal caliber small bowel distally.  Favor ileus over obstruction.   2. Fluid-filled colon which could be seen with a mild colitis or other diarrheal illness.   3. Enteric tube tip just past the GE junction.  Suggest advancing at least 5 cm if possible.   4. Other findings above.         Electronically signed by: Ritchie Coker   Date:    10/02/2023   Time:    13:04      X-Ray Abdomen AP 1 View   Final Result      As above.         Electronically signed by: Stevo Thorne   Date:    10/01/2023   Time:    09:36      X-Ray Hip 1 View Right (with Pelvis when performed)   Final Result      Expected postoperative changes.       "   Electronically signed by: Stevo Thorne   Date:    09/29/2023   Time:    18:23      X-Ray Chest 1 View   Final Result      XR Gastric tube check, non-radiologist performed   Final Result      Nasogastric tube terminates within the gastric fundus.         Electronically signed by: Desmond Villa   Date:    09/27/2023   Time:    17:51      SURG FL Surgery Fluoro Usage   Final Result      X-Ray Abdomen AP 1 View   Final Result      Progressive gaseous distension of the bowel.  Consider ileus versus distal obstruction.         Electronically signed by: Kathie Atwood   Date:    09/27/2023   Time:    06:42      MRI Lumbar Spine Without Contrast   Final Result      1. L3 vertebral body superior half acute compression deformity with paraspinal soft inflammations.  Combination retropulsed bony fragment and ventral epidural hematoma results in central canal stenosis.      2. Lumbar degenerative disc disease and spondylosis level by level discussed above.         Electronically signed by: Desmond Villa   Date:    09/23/2023   Time:    15:40      MRI Thoracic Spine Without Contrast   Final Result      No evidence for acute osseous finding or static listhesis      No significant herniation or bulge.  No canal, cord, or foraminal compromise      Mild thoracic spondylosis.         Electronically signed by: Jose Antonio Mak MD   Date:    09/23/2023   Time:    15:33      CT Ankle (Including Hindfoot) Without Contrast Right   Final Result      Calcaneus fracture as described above.         Electronically signed by: Jose Antonio Mak MD   Date:    09/23/2023   Time:    14:21      X-Ray Hand 3 view Left   Final Result      No acute osseous abnormality identified.         Electronically signed by: Desmond Villa   Date:    09/23/2023   Time:    13:53      X-Ray Knee Complete 4 or More Views Left   Final Result      No acute osseous abnormality identified.         Electronically signed by: Desmond Villa   Date:    09/23/2023   Time:    13:51       Xray Previous   Final Result      Xray Previous   Final Result      Xray Previous   Final Result      CT Previous   Final Result      CT Previous   Final Result      CT Previous   Final Result      CT Previous   Final Result      X-Ray Pelvis Routine AP   Final Result      Right subcapital femoral neck fracture.         Electronically signed by: Jose Antonio Mak MD   Date:    09/23/2023   Time:    12:31      X-Ray Femur Ap/Lat Right   Final Result      Right subcapital femoral neck fracture.         Electronically signed by: Jose Antonio Mak MD   Date:    09/23/2023   Time:    12:27      X-Ray Wrist Complete Right   Final Result      Mildly displaced dorsal triquetral fracture.      Nondisplaced distal radius fracture.         Electronically signed by: Jose Antonio Mak MD   Date:    09/23/2023   Time:    11:45      X-Ray Ankle Complete Right   Final Result      Fractured calcaneus.         Electronically signed by: Desmond Villa   Date:    09/23/2023   Time:    11:45         I have reviewed all pertinent imaging results/findings within the past 24 hours.    Micro/Path/Other:  Microbiology Results (last 7 days)       Procedure Component Value Units Date/Time    Blood Culture [[id number]]  (Normal) Collected: 10/03/23 0824    Order Status: Completed Specimen: Blood Updated: 10/07/23 1001     CULTURE, BLOOD ([facility]) No Growth At 96 Hours           Specimen (168h ago, onward)      None             Problems list:  Active Problem List with Overview Notes    Diagnosis Date Noted    Ileus 10/03/2023    Closed nondisplaced fracture of right calcaneus 09/24/2023    Closed fracture of right femur 09/24/2023    Epidural hematoma 09/24/2023    Spinal stenosis of lumbar region 09/24/2023    Closed fracture of third lumbar vertebra 09/23/2023    Radius fracture 09/23/2023    Closed right hip fracture 09/23/2023    Displaced fracture of body of right talus, initial encounter for closed fracture 09/23/2023        Assessment & Plan:   47  y.o. male admitted on 9/23/2023 following  fell or slept wall fall from a balcony of approximally 12th and 13th feet.  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      Consults:   Neurosurgery  Orthopedic surgery   Therapy:  Physical Therapy  Occupational Therapy Weight bearing status:   Will verify WB status with Ortho  Precautions:  Fall and Spinal   Seizure prophylaxis:  Not indicated. VTE prophylaxis:     Prophylactic Lovenox 40mg BID GI prophylaxis:  H2B   Outpatient follow up:  Orthopedic surgery  Neurosurgery  Disposition:  Pending progress with therapy      -Tolerating regular diet  -Ambulating well with PT, recommending rehab vs. Home with HH  -Case management assisting with medicaid approval  - PO and IV pain meds  - transition to PO PPI daily  - Lovenox for DVT prophylaxis    Que Pérez MD  General Surgery

## 2023-10-08 NOTE — PT/OT/SLP PROGRESS
Physical Therapy Treatment    Patient Name:  José Henry   MRN:  [id number]    Recommendations:     Discharge Recommendations:  rehabilitation facility   Discharge Equipment Recommendations: platform, walker, rolling, bath bench, bedside commode     Subjective     Patient awake and alert.     Objective:     General Precautions: Standard, fall   Orthopedic Precautions:spinal precautions, RLE posterior precautions, RLE non weight bearing, RUE non weight bearing   Braces:    Respiratory Status: Room air  Skin Integrity: Visible skin intact    Communicated with nurse prior to session.  Patient found up in chair with CamBoot already on upon PT entry to room. Pt performed sit to stand with SBA from chair to PFRW to walk. Pt amb with PFRW x 120' SBA and hop to gait in order to maintain NWB status. Pt required standing rest break x 4 during gait training.  Pt sat EOB following walk and performed bed mobility with sba to get into supine in bed. Patient left supine with all lines intact and call button in reach.    PT interventions performed during the course of today's session in an effort to prevent and/or reduce acquired pressure injuries:   Therapeutic positioning completed       GOALS:   Multidisciplinary Problems       Physical Therapy Goals          Problem: Physical Therapy    Goal Priority Disciplines Outcome Goal Variances Interventions   Physical Therapy Goal     PT, PT/OT Ongoing, Progressing     Description: Goals to be met by: 2023     Patient will increase functional independence with mobility by performin. Supine to sit with Modified [place]  2. Sit to stand transfer with Modified [place]  3. Gait  x 150 feet with Modified [place] using platform RW.                          Assessment:     José Henry is a 47 y.o. male admitted with a medical diagnosis of <principal problem not specified>.     Patient Active Problem List   Diagnosis    Closed fracture of third lumbar vertebra     Radius fracture    Closed right hip fracture    Displaced fracture of body of right talus, initial encounter for closed fracture    Closed nondisplaced fracture of right calcaneus    Closed fracture of right femur    Epidural hematoma    Spinal stenosis of lumbar region    Ileus        Rehab Prognosis: Good; patient would benefit from acute skilled PT services to address these deficits and reach maximum level of function.    Recent Surgery: Procedure(s) (LRB):  ARTHROPLASTY, HIP (Right) 9 Days Post-Op    Plan:     During this hospitalization, patient to be seen 6 x/week to address the identified rehab impairments via gait training, therapeutic activities, therapeutic exercises, neuromuscular re-education and progress toward the following goals:    Plan of Care Expires:  11/01/23    Billable Minutes     Billable Minutes: Gait Training 15 and Therapeutic Activity 12    Treatment Type: Treatment  PT/PTA: PTA     Number of PTA visits since last PT visit: 0     10/08/2023

## 2023-10-09 LAB
ABS NEUT (OLG): 9.17 X10(3)/MCL (ref 2.1–9.2)
ALBUMIN SERPL-MCNC: 2.9 G/DL (ref 3.5–5)
ALBUMIN/GLOB SERPL: 1.2 RATIO (ref 1.1–2)
ALP SERPL-CCNC: 144 UNIT/L (ref 40–150)
ALT SERPL-CCNC: 56 UNIT/L (ref 0–55)
ANISOCYTOSIS BLD QL SMEAR: ABNORMAL
AST SERPL-CCNC: 34 UNIT/L (ref 5–34)
BILIRUB SERPL-MCNC: 0.7 MG/DL
BUN SERPL-MCNC: 9.1 MG/DL (ref 8.9–20.6)
CALCIUM SERPL-MCNC: 8.4 MG/DL (ref 8.4–10.2)
CHLORIDE SERPL-SCNC: 102 MMOL/L (ref 98–107)
CO2 SERPL-SCNC: 25 MMOL/L (ref 22–29)
CREAT SERPL-MCNC: 0.8 MG/DL (ref 0.73–1.18)
CRP SERPL-MCNC: 42.5 MG/L
EOSINOPHIL NFR BLD MANUAL: 0.39 X10(3)/MCL (ref 0–0.9)
EOSINOPHIL NFR BLD MANUAL: 3 %
ERYTHROCYTE [DISTWIDTH] IN BLOOD BY AUTOMATED COUNT: 15.2 % (ref 11.5–17)
GFR SERPLBLD CREATININE-BSD FMLA CKD-EPI: >60 MLS/MIN/1.73/M2
GLOBULIN SER-MCNC: 2.5 GM/DL (ref 2.4–3.5)
GLUCOSE SERPL-MCNC: 108 MG/DL (ref 74–100)
HCT VFR BLD AUTO: 26.2 % (ref 42–52)
HGB BLD-MCNC: 8.5 G/DL (ref 14–18)
INSTRUMENT WBC (OLG): 13.1 X10(3)/MCL
LYMPHOCYTES NFR BLD MANUAL: 1.31 X10(3)/MCL
LYMPHOCYTES NFR BLD MANUAL: 10 %
MAGNESIUM SERPL-MCNC: 2.2 MG/DL (ref 1.6–2.6)
MCH RBC QN AUTO: 29.4 PG (ref 27–31)
MCHC RBC AUTO-ENTMCNC: 32.4 G/DL (ref 33–36)
MCV RBC AUTO: 90.7 FL (ref 80–94)
MONOCYTES NFR BLD MANUAL: 17 %
MONOCYTES NFR BLD MANUAL: 2.23 X10(3)/MCL (ref 0.1–1.3)
MYELOCYTES NFR BLD MANUAL: 1 %
NEUTROPHILS NFR BLD MANUAL: 70 %
NRBC BLD AUTO-RTO: 0 %
PHOSPHATE SERPL-MCNC: 5 MG/DL (ref 2.3–4.7)
PLATELET # BLD AUTO: 762 X10(3)/MCL (ref 130–400)
PLATELET # BLD EST: ABNORMAL 10*3/UL
PMV BLD AUTO: 8.9 FL (ref 7.4–10.4)
POLYCHROMASIA BLD QL SMEAR: ABNORMAL
POTASSIUM SERPL-SCNC: 4.9 MMOL/L (ref 3.5–5.1)
PREALB SERPL-MCNC: 17 MG/DL (ref 18–45)
PROT SERPL-MCNC: 5.4 GM/DL (ref 6.4–8.3)
RBC # BLD AUTO: 2.89 X10(6)/MCL (ref 4.7–6.1)
RBC MORPH BLD: ABNORMAL
SODIUM SERPL-SCNC: 135 MMOL/L (ref 136–145)
WBC # SPEC AUTO: 13.14 X10(3)/MCL (ref 4.5–11.5)

## 2023-10-09 PROCEDURE — 21400001 HC TELEMETRY ROOM

## 2023-10-09 PROCEDURE — 97116 GAIT TRAINING THERAPY: CPT | Mod: CQ

## 2023-10-09 PROCEDURE — 86140 C-REACTIVE PROTEIN: CPT | Performed by: NURSE PRACTITIONER

## 2023-10-09 PROCEDURE — 97535 SELF CARE MNGMENT TRAINING: CPT | Mod: CO

## 2023-10-09 PROCEDURE — 63600175 PHARM REV CODE 636 W HCPCS: Performed by: NURSE PRACTITIONER

## 2023-10-09 PROCEDURE — 97530 THERAPEUTIC ACTIVITIES: CPT | Mod: CQ

## 2023-10-09 PROCEDURE — 25000003 PHARM REV CODE 250: Performed by: NURSE PRACTITIONER

## 2023-10-09 PROCEDURE — 85027 COMPLETE CBC AUTOMATED: CPT

## 2023-10-09 PROCEDURE — 84134 ASSAY OF PREALBUMIN: CPT | Performed by: NURSE PRACTITIONER

## 2023-10-09 PROCEDURE — 11000001 HC ACUTE MED/SURG PRIVATE ROOM

## 2023-10-09 PROCEDURE — 84100 ASSAY OF PHOSPHORUS: CPT

## 2023-10-09 PROCEDURE — 97530 THERAPEUTIC ACTIVITIES: CPT | Mod: CO

## 2023-10-09 PROCEDURE — 80053 COMPREHEN METABOLIC PANEL: CPT

## 2023-10-09 PROCEDURE — 83735 ASSAY OF MAGNESIUM: CPT

## 2023-10-09 RX ADMIN — Medication 1 TABLET: at 09:10

## 2023-10-09 RX ADMIN — PANTOPRAZOLE SODIUM 40 MG: 40 TABLET, DELAYED RELEASE ORAL at 09:10

## 2023-10-09 RX ADMIN — OXYCODONE HYDROCHLORIDE 10 MG: 10 TABLET ORAL at 02:10

## 2023-10-09 RX ADMIN — FOLIC ACID 1 MG: 1 TABLET ORAL at 09:10

## 2023-10-09 RX ADMIN — OXYCODONE HYDROCHLORIDE 10 MG: 10 TABLET ORAL at 03:10

## 2023-10-09 RX ADMIN — ENOXAPARIN SODIUM 40 MG: 40 INJECTION SUBCUTANEOUS at 09:10

## 2023-10-09 RX ADMIN — OXYCODONE HYDROCHLORIDE 10 MG: 10 TABLET ORAL at 07:10

## 2023-10-09 RX ADMIN — THIAMINE HCL TAB 100 MG 100 MG: 100 TAB at 09:10

## 2023-10-09 RX ADMIN — GABAPENTIN 300 MG: 300 CAPSULE ORAL at 09:10

## 2023-10-09 RX ADMIN — Medication 6 MG: at 09:10

## 2023-10-09 RX ADMIN — GABAPENTIN 300 MG: 300 CAPSULE ORAL at 02:10

## 2023-10-09 RX ADMIN — OXYCODONE HYDROCHLORIDE 10 MG: 10 TABLET ORAL at 09:10

## 2023-10-09 RX ADMIN — THERA TABS 1 TABLET: TAB at 09:10

## 2023-10-09 NOTE — PROGRESS NOTES
"   Trauma Surgery   Progress Note  Admit Date: 9/23/2023  HD#16  POD#10 Days Post-Op    Subjective:   Interval history:  NAEON  AFVSS  Patient is having Bms, and passing flatus  UOP appropriate  Tolerating regular diet without n/v  PT recommending rehab vs. Home with HH  Pain well controlled     Home Meds:   Current Outpatient Medications   Medication Instructions    dextroamphetamine-amphetamine 30 mg Tab 1 tablet, Oral, Daily    pantoprazole (PROTONIX) 40 MG tablet 1 tablet, Oral, Daily      Scheduled Meds:   calcium-vitamin D3  1 tablet Oral BID    docusate  100 mg Oral BID    enoxparin  40 mg Subcutaneous Q12H (prophylaxis, 0900/2100)    folic acid  1 mg Oral Daily    gabapentin  300 mg Oral TID    multivitamin  1 tablet Oral Daily    pantoprazole  40 mg Oral Daily    polyethylene glycol  17 g Oral BID    thiamine  100 mg Oral Daily     Continuous Infusions:      PRN Meds:bisacodyL, heparin, porcine (PF), melatonin, metoclopramide HCl, midazolam, ondansetron, oxyCODONE, promethazine     Objective:     VITAL SIGNS: 24 HR MIN & MAX LAST   Temp  Min: 97.6 °F (36.4 °C)  Max: 98.4 °F (36.9 °C)  97.6 °F (36.4 °C)   BP  Min: 101/66  Max: 124/81  104/72    Pulse  Min: 90  Max: 112  90    Resp  Min: 17  Max: 18  17    SpO2  Min: 94 %  Max: 99 %  99 %      HT: 6' 2.02" (188 cm)  WT: 93.9 kg (207 lb)  BMI: 26.6     Intake/output:  Intake/Output - Last 3 Shifts         10/07 0700  10/08 0659 10/08 0700  10/09 0659    Urine (mL/kg/hr) 1000 (0.4) 1200 (0.5)    Total Output 1000 1200    Net -1000 -1200          Urine Occurrence  1 x    Stool Occurrence 1 x 2 x            Intake/Output Summary (Last 24 hours) at 10/9/2023 0638  Last data filed at 10/9/2023 0300  Gross per 24 hour   Intake --   Output 1200 ml   Net -1200 ml         Lines/drains/airway:       Peripheral IV - Single Lumen 09/26/23 2200 Anterior;Left Forearm (Active)   Site Assessment Clean;Dry;Intact;No redness;No swelling;No warmth;No drainage 10/01/23 0400 "   Extremity Assessment Distal to IV No abnormal discoloration 10/01/23 0400   Line Status Infusing 10/01/23 0400   Dressing Status Clean;Dry;Intact 10/01/23 0400   Dressing Intervention Integrity maintained 10/01/23 0400   Number of days: 4            Peripheral IV - Single Lumen 09/27/23 0728 18 G Left Hand (Active)   Site Assessment Clean;Dry;Intact;No redness;No swelling;No warmth;No drainage 10/01/23 0400   Extremity Assessment Distal to IV No abnormal discoloration 10/01/23 0400   Line Status Saline locked 10/01/23 0400   Dressing Status Clean;Dry;Intact 10/01/23 0400   Dressing Intervention Integrity maintained 10/01/23 0400   Number of days: 4            NG/OG Tube 09/27/23 1730 nasogastric Left nostril (Active)   Placement Check placement verified by aspirate characteristics 10/01/23 0400   Distal Tube Length (cm) 53 10/01/23 0000   Tolerance no signs/symptoms of discomfort 10/01/23 0400   Securement secured to nostril center w/ adhesive device 10/01/23 0400   Clamp Status/Tolerance unclamped;no emesis;no nausea;no restlessness 10/01/23 0400   Suction Setting/Drainage Method suction at;low;intermittent setting 10/01/23 0400   Insertion Site Appearance no redness, warmth, tenderness, skin breakdown, drainage 10/01/23 0400   Drainage Bile;Green 10/01/23 0400   Flush/Irrigation flushed w/;water;no resistance met 09/29/23 2000   Tube Output(mL)(Include Discarded Residual) 250 mL 10/01/23 0528   Number of days: 3       Male External Urinary Catheter 09/30/23 0500 (Active)   Collection Container Urimeter 10/01/23 0400   Securement Method secured to top of thigh w/ adhesive device 10/01/23 0400   Skin no redness;no breakdown;penis/scrotum cleansed w/ soap and water 10/01/23 0400   Tolerance no signs/symptoms of discomfort;assisted with appliance change 10/01/23 0400   Output (mL) 100 mL 10/01/23 0528   Catheter Change Date 09/30/23 10/01/23 0400   Catheter Change Time 0430 10/01/23 0400   Number of days: 1  "      Physical examination:  Gen: NAD, AAOx3, answering questions appropriately  HEENT: normocephalic, atraumatic, EOMI, PERRL  CV: RR  Resp: NWOB  Abd: S/distended, nontender  Msk: moving all extremities spontaneously and purposefully- reduced to RUE/RLE. Ortho dressings removed  Neuro: CN II-XII grossly intact GCS 15  Skin/wounds: warm dry, dressings to RUE RLE    Labs:  Renal:  Recent Labs     10/09/23  0519   BUN 9.1   CREATININE 0.80     No results for input(s): "LACTIC" in the last 72 hours.  FEN/GI:  Recent Labs     10/09/23  0519   *   K 4.9   CO2 25   CALCIUM 8.4   MG 2.20   PHOS 5.0*   ALBUMIN 2.9*   BILITOT 0.7   AST 34   ALKPHOS 144   ALT 56*     Heme:  Recent Labs     10/09/23  0519   HGB 8.5*   HCT 26.2*   *     ID:  Recent Labs     10/09/23  0519   WBC 13.14*     CBG:  Recent Labs     10/09/23  0519   GLUCOSE 108*      No results for input(s): "POCTGLUCOSE" in the last 72 hours.   Cardiovascular:  No results for input(s): "TROPONINI", "CKTOTAL", "CKMB", "BNP" in the last 168 hours.  I have reviewed all pertinent lab results within the past 24 hours.    Imaging:  CT Abdomen Pelvis With Contrast   Final Result      1. Moderate dilatation of small-bowel loops with gradual transition to normal caliber small bowel distally.  Favor ileus over obstruction.   2. Fluid-filled colon which could be seen with a mild colitis or other diarrheal illness.   3. Enteric tube tip just past the GE junction.  Suggest advancing at least 5 cm if possible.   4. Other findings above.         Electronically signed by: Ritchie Coker   Date:    10/02/2023   Time:    13:04      X-Ray Abdomen AP 1 View   Final Result      As above.         Electronically signed by: Stevo Thorne   Date:    10/01/2023   Time:    09:36      X-Ray Hip 1 View Right (with Pelvis when performed)   Final Result      Expected postoperative changes.         Electronically signed by: Stevo Thorne   Date:    09/29/2023   Time:    18:23    "   X-Ray Chest 1 View   Final Result      XR Gastric tube check, non-radiologist performed   Final Result      Nasogastric tube terminates within the gastric fundus.         Electronically signed by: Desmond Villa   Date:    09/27/2023   Time:    17:51      SURG FL Surgery Fluoro Usage   Final Result      X-Ray Abdomen AP 1 View   Final Result      Progressive gaseous distension of the bowel.  Consider ileus versus distal obstruction.         Electronically signed by: Kathie Atwood   Date:    09/27/2023   Time:    06:42      MRI Lumbar Spine Without Contrast   Final Result      1. L3 vertebral body superior half acute compression deformity with paraspinal soft inflammations.  Combination retropulsed bony fragment and ventral epidural hematoma results in central canal stenosis.      2. Lumbar degenerative disc disease and spondylosis level by level discussed above.         Electronically signed by: Desmond Villa   Date:    09/23/2023   Time:    15:40      MRI Thoracic Spine Without Contrast   Final Result      No evidence for acute osseous finding or static listhesis      No significant herniation or bulge.  No canal, cord, or foraminal compromise      Mild thoracic spondylosis.         Electronically signed by: Jose Antonio Mak MD   Date:    09/23/2023   Time:    15:33      CT Ankle (Including Hindfoot) Without Contrast Right   Final Result      Calcaneus fracture as described above.         Electronically signed by: Jose Antonio Mak MD   Date:    09/23/2023   Time:    14:21      X-Ray Hand 3 view Left   Final Result      No acute osseous abnormality identified.         Electronically signed by: Desmond Villa   Date:    09/23/2023   Time:    13:53      X-Ray Knee Complete 4 or More Views Left   Final Result      No acute osseous abnormality identified.         Electronically signed by: Desmond Villa   Date:    09/23/2023   Time:    13:51      Xray Previous   Final Result      Xray Previous   Final Result      Xray  Previous   Final Result      CT Previous   Final Result      CT Previous   Final Result      CT Previous   Final Result      CT Previous   Final Result      X-Ray Pelvis Routine AP   Final Result      Right subcapital femoral neck fracture.         Electronically signed by: Jose Antonio Mak MD   Date:    09/23/2023   Time:    12:31      X-Ray Femur Ap/Lat Right   Final Result      Right subcapital femoral neck fracture.         Electronically signed by: Jose Antonio Mak MD   Date:    09/23/2023   Time:    12:27      X-Ray Wrist Complete Right   Final Result      Mildly displaced dorsal triquetral fracture.      Nondisplaced distal radius fracture.         Electronically signed by: Jose Antonio Mak MD   Date:    09/23/2023   Time:    11:45      X-Ray Ankle Complete Right   Final Result      Fractured calcaneus.         Electronically signed by: Desmond Villa   Date:    09/23/2023   Time:    11:45         I have reviewed all pertinent imaging results/findings within the past 24 hours.    Micro/Path/Other:  Microbiology Results (last 7 days)       Procedure Component Value Units Date/Time    Blood Culture [[id number]]  (Normal) Collected: 10/03/23 0824    Order Status: Completed Specimen: Blood Updated: 10/08/23 1000     CULTURE, BLOOD ([facility]) No Growth at 5 days           Specimen (168h ago, onward)      None             Problems list:  Active Problem List with Overview Notes    Diagnosis Date Noted    Ileus 10/03/2023    Closed nondisplaced fracture of right calcaneus 09/24/2023    Closed fracture of right femur 09/24/2023    Epidural hematoma 09/24/2023    Spinal stenosis of lumbar region 09/24/2023    Closed fracture of third lumbar vertebra 09/23/2023    Radius fracture 09/23/2023    Closed right hip fracture 09/23/2023    Displaced fracture of body of right talus, initial encounter for closed fracture 09/23/2023        Assessment & Plan:   47 y.o. male admitted on 9/23/2023 following  fell or slept wall fall from a  balcony of approximally 12th and 13th feet.  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      Consults:   Neurosurgery  Orthopedic surgery   Therapy:  Physical Therapy  Occupational Therapy Weight bearing status:   NWB RLE, RUE Precautions:  Fall and Spinal   Seizure prophylaxis:  Not indicated. VTE prophylaxis:     Prophylactic Lovenox 40mg BID GI prophylaxis:  H2B   Outpatient follow up:  Orthopedic surgery  Neurosurgery  Disposition:  Rehab vs HH w/ DME       -Tolerating regular diet  -Ambulating well with PT, recommending rehab vs. Home with HH:   -will need 3 week fu w/ Ortho for calcaneous/wrist fx   -Case management assisting with medicaid approval  - PO and IV pain meds  - transition to PO PPI daily  - Lovenox for DVT prophylaxis    Lalit Pike MD   [employer] General Surgery, PGY-1

## 2023-10-09 NOTE — PROGRESS NOTES
Inpatient Nutrition Assessment    Admit Date: 9/23/2023   Total duration of encounter: 16 days     Nutrition Recommendation/Prescription     Continue regular diet  Continue vitamin supplementation   Boost Plus TID provide an additional 360 kcal and 14 g protein per container      Communication of Recommendations: reviewed with nurse    Nutrition Assessment     Malnutrition Assessment/Nutrition-Focused Physical Exam    Malnutrition Context: acute illness or injury (09/29/23 1508)  Malnutrition Level:  (does not meet criteria) (09/29/23 1508)  Energy Intake (Malnutrition): less than or equal to 50% for greater than or equal to 5 days (09/29/23 1508)  Weight Loss (Malnutrition):  (does not meet criteria) (09/29/23 1508)  Subcutaneous Fat (Malnutrition):  (does not meet criteria) (09/29/23 1508)           Muscle Mass (Malnutrition):  (does not meet criteria) (09/29/23 1508)                                   A minimum of two characteristics is recommended for diagnosis of either severe or non-severe malnutrition.    Chart Review    Reason Seen: length of stay    Malnutrition Screening Tool Results   Have you recently lost weight without trying?: No  Have you been eating poorly because of a decreased appetite?: No   MST Score: 0   Diagnosis:  POD#2 s/p L1 to L5 posterolateral fusion with L2-3 laminectomy for a L3 burst fracture  Ileus vs distal obstruction  Slept walk off balcony resulting in R hip fx, L3 burst fx, central cord stenosis, calcaneus fx, displaced fx of talus    Relevant Medical History: ETOH use, smoker, GERD    Nutrition-Related Medications: mv, calcium-vitamin D3, Pepcid, folic acid, MCI, thiamine, miralax  Calorie Containing IV Medications: no significant kcals from medications at this time    Nutrition-Related Labs: 9/29: RBC-3.59, H/H-10.8/31.7, glu-103, sherif-8.0  10/3: K 2.9, Crea 0.63, Ca 8.1  10/5: Na 132, K 3.2, BUN 8.1, Crea0.61, Glu 126, Ca 7.9, PAB 11.6, CRP 66.7  10/9: Na 135, Glu 108, Phos 5,  "PAB 17, CRP 42.5      Diet/PN Order: Diet Adult Regular  Oral Supplement Order: Boost Plus  Tube Feeding Order: none  Appetite/Oral Intake: good/% of meals  Factors Affecting Nutritional Intake: none identified  Food/Yazidi/Cultural Preferences: none reported  Food Allergies: none reported    Wound(s):       Last Bowel Movement: 10/08/23    Comments    : pt NPO x 2 days, NGT to suction for ileus. Pt to OR today for hip ORIF. Pt reports poor appetite since admission, with good appetite prior. No Gi issues reported.  lb with no unintentional weight loss prior admission.    10/3: pt started on clear liquid diet this morning, NG tube clamped, will add Boost Breeze; has been NPO or clear liquid diet x 5-6 days    10/5: tolerating regular diet, reports good intake, drinking oral supplement and requesting to switch to chocolate flavor    10/9: tolerating oral diet, no GI concerns    Anthropometrics    Height: 6' 2.02" (188 cm) Height Method: Stated  Last Weight: 93.9 kg (207 lb) (23 1056) Weight Method: Bed Scale  BMI (Calculated): 26.6  BMI Classification: overweight (BMI 25-29.9)        Ideal Body Weight (IBW), Male: 190.12 lb     % Ideal Body Weight, Male (lb): 108.95 %                 Usual Body Weight (UBW), k.9 kg  % Usual Body Weight: 100.2     Usual Weight Provided By: patient and patient denies unintentional weight loss    Wt Readings from Last 5 Encounters:   23 93.9 kg (207 lb)   20 104.1 kg (229 lb 8 oz)     Weight Change(s) Since Admission:  Admit Weight: 93.9 kg (207 lb) (23 1056)      Estimated Needs    Weight Used For Calorie Calculations: 93.9 kg (207 lb 0.2 oz)  Energy Calorie Requirements (kcal): 8067-3771 kcal (1.1-1.2 stress factor  Energy Need Method: [facility]  Weight Used For Protein Calculations: 93.9 kg (207 lb 0.2 oz)  Protein Requirements:  g (1.0-1.2 g/kg)  Fluid Requirements (mL): 8098-4164 mL (1 mL/kcal)  Temp (24hrs), Av.3 °F " (36.8 °C), Min:97.6 °F (36.4 °C), Max:98.8 °F (37.1 °C)       Enteral Nutrition    Patient not receiving enteral nutrition at this time.    Parenteral Nutrition    Patient not receiving parenteral nutrition support at this time.    Evaluation of Received Nutrient Intake    Calories: meeting estimated needs  Protein: meeting estimated needs    Patient Education    Not applicable.    Nutrition Diagnosis     PES: Inadequate energy intake related to inability to consume sufficient nutrients as evidenced by <50% est energy needs met x 5-6 days. (improving)    Interventions/Goals     Intervention(s): general/healthful diet, commercial beverage, multivitamin/mineral supplement therapy, and collaboration with other providers  Goal: Meet greater than 75% of nutritional needs by follow-up. (goal met)    Monitoring & Evaluation     Dietitian will monitor energy intake, weight, electrolyte/renal panel, and glucose/endocrine profile.  Nutrition Risk/Follow-Up: high (follow-up in 1-4 days)   Please consult if re-assessment needed sooner.

## 2023-10-09 NOTE — PT/OT/SLP PROGRESS
Occupational Therapy   Treatment    Name: José Henry  MRN: [id number]  Admitting Diagnosis:  Closed fx of third lumbar vertebra  10 Days Post-Op    Recommendations:     Discharge Recommendations: rehabilitation facility  Discharge Equipment Recommendations:  platform, walker, rolling, bedside commode, bath bench  Barriers to discharge:       Assessment:     José Henry is a 47 y.o. male with a medical diagnosis of closed fx of third lumbar vertebra.  He presents with great ax tolerance and motivation to participate. Performance deficits affecting function are weakness, gait instability, decreased upper extremity function, impaired endurance, impaired balance, decreased lower extremity function, impaired self care skills, impaired functional mobility, orthopedic precautions.     Rehab Prognosis:  Good; patient would benefit from acute skilled OT services to address these deficits and reach maximum level of function.       Plan:     Patient to be seen 5 x/week to address the above listed problems via self-care/home management, therapeutic activities, therapeutic exercises  Plan of Care Expires: 10/29/23  Plan of Care Reviewed with: patient    Subjective     Pain/Comfort:  Pain Rating 1: 0/10    Objective:     Communicated with: RN prior to session.  Patient found supine with telemetry upon OT entry to room.    General Precautions: Standard, fall    Orthopedic Precautions:spinal precautions, RLE posterior precautions, RUE non weight bearing, RLE non weight bearing  Braces: TLSO (CAM boot LLE)  Respiratory Status: Room air     Occupational Performance:     Bed Mobility:    Patient completed Supine to Sit with stand by assistance  Patient completed Sit to Supine with stand by assistance     Functional Mobility/Transfers:  Pt completed sit<>stand t/f from EOB x10 reps with Min A initially, progressing to CGA with PRW. Good adherence to NWB on RLE.   Functional Mobility: CGA with PRW around room. No LOB.     Activities  of Daily Living:  LE dressing: Max A to don CAM boot and sock 2/2 hip and spinal pxns.   UE dressing: donned/doffed TLSO with SPV.   Provided pt with hip kit and educated on use. Able to return demo on use of reacher and sock aid for LBD. Verbalized understanding of long handled sponge and shoe horn.     Therapeutic Positioning    OT interventions performed during the course of today's session in an effort to prevent and/or reduce acquired pressure injuries:   Education was provided on benefits of and recommendations for therapeutic positioning    Skin assessment: all bony prominences were assessed    Findings: no redness or breakdown noted    [facility] 6 Click ADL: 19    Patient Education:  Patient provided with verbal education education regarding OT role/goals/POC, safety awareness, and Discharge/DME recommendations.  Understanding was verbalized.      Patient left supine with all lines intact and call button in reach    GOALS:   Multidisciplinary Problems       Occupational Therapy Goals          Problem: Occupational Therapy    Goal Priority Disciplines Outcome Interventions   Occupational Therapy Goal     OT, PT/OT Ongoing, Progressing    Description: Goals to be met by 10/29/23:    Pt will complete grooming standing at sink with LRAD with SBA.  Pt will complete UB dressing with SBA.  Pt will complete LB dressing with SBA using AE  Pt will complete toileting with SBA using LRAD.  Pt will complete functional mobility to/from toilet and toilet transfer with SBA using LRAD.                          Time Tracking:     OT Date of Treatment: 10/09/23  OT Start Time: 1058  OT Stop Time: 1121  OT Total Time (min): 23 min    Billable Minutes:Self Care/Home Management 10  Therapeutic Activity 13    OT/JOSE: JOSE     Number of JOSE visits since last OT visit: 3    10/9/2023

## 2023-10-09 NOTE — PT/OT/SLP PROGRESS
Physical Therapy Treatment    Patient Name:  José Henry   MRN:  [id number]    Recommendations:     Discharge Recommendations:  rehabilitation facility   Discharge Equipment Recommendations: platform, walker, rolling, bath bench, bedside commode     Subjective     Patient awake and alert.     Objective:     General Precautions: Standard, fall   Orthopedic Precautions:spinal precautions, RLE posterior precautions, RLE non weight bearing, RUE non weight bearing   Braces:    Respiratory Status: Room air  Communicated with nurse prior to treatment.     Functional Mobility:    Rolling:Independent  Supine to sit:Independent  Sit to stand transfer: Stand-by Assistance  Bed to chair transfer:Stand-by Assistance    TLSO and CAM boot donned. Gait 135 ft with PRW NWB left wrist and left foot with LOB but pt self corrected.  Gait to bathroom and pt had BM. Assistance with hygiene and assisted BTB per his frequent.     Education Provided:  Role and goals of PT, transfer training, bed mobility, gait training, balance training, safety awareness, assistive device, strengthening exercises, and importance of participating in PT to return to PLOF.         Skin Integrity: Visible skin intact    PT interventions performed during the course of today's session in an effort to prevent and/or reduce acquired pressure injuries:   Therapeutic positioning completed       GOALS:   Multidisciplinary Problems       Physical Therapy Goals          Problem: Physical Therapy    Goal Priority Disciplines Outcome Goal Variances Interventions   Physical Therapy Goal     PT, PT/OT Ongoing, Progressing     Description: Goals to be met by: 2023     Patient will increase functional independence with mobility by performin. Supine to sit with Modified [place]  2. Sit to stand transfer with Modified [place]  3. Gait  x 150 feet with Modified [place] using platform RW.                          Assessment:     José Henry is a 47  y.o. male admitted with a medical diagnosis of <principal problem not specified>.     Patient Active Problem List   Diagnosis    Closed fracture of third lumbar vertebra    Radius fracture    Closed right hip fracture    Displaced fracture of body of right talus, initial encounter for closed fracture    Closed nondisplaced fracture of right calcaneus    Closed fracture of right femur    Epidural hematoma    Spinal stenosis of lumbar region    Ileus        Rehab Prognosis: Good; patient would benefit from acute skilled PT services to address these deficits and reach maximum level of function.    Recent Surgery: Procedure(s) (LRB):  ARTHROPLASTY, HIP (Right) 10 Days Post-Op    Plan:     During this hospitalization, patient to be seen 6 x/week to address the identified rehab impairments via gait training, therapeutic activities, therapeutic exercises, neuromuscular re-education and progress toward the following goals:    Plan of Care Expires:  11/01/23    Billable Minutes     Billable Minutes: Gait Training 23    Treatment Type: Treatment  PT/PTA: PTA     Number of PTA visits since last PT visit: 1     10/09/2023

## 2023-10-10 PROCEDURE — 97110 THERAPEUTIC EXERCISES: CPT | Mod: CQ

## 2023-10-10 PROCEDURE — 21400001 HC TELEMETRY ROOM

## 2023-10-10 PROCEDURE — 11000001 HC ACUTE MED/SURG PRIVATE ROOM

## 2023-10-10 PROCEDURE — 63600175 PHARM REV CODE 636 W HCPCS: Performed by: NURSE PRACTITIONER

## 2023-10-10 PROCEDURE — 25000003 PHARM REV CODE 250: Performed by: NURSE PRACTITIONER

## 2023-10-10 PROCEDURE — 97116 GAIT TRAINING THERAPY: CPT | Mod: CQ

## 2023-10-10 RX ADMIN — OXYCODONE HYDROCHLORIDE 10 MG: 10 TABLET ORAL at 10:10

## 2023-10-10 RX ADMIN — THIAMINE HCL TAB 100 MG 100 MG: 100 TAB at 10:10

## 2023-10-10 RX ADMIN — ENOXAPARIN SODIUM 40 MG: 40 INJECTION SUBCUTANEOUS at 08:10

## 2023-10-10 RX ADMIN — OXYCODONE HYDROCHLORIDE 10 MG: 10 TABLET ORAL at 06:10

## 2023-10-10 RX ADMIN — ENOXAPARIN SODIUM 40 MG: 40 INJECTION SUBCUTANEOUS at 09:10

## 2023-10-10 RX ADMIN — GABAPENTIN 300 MG: 300 CAPSULE ORAL at 08:10

## 2023-10-10 RX ADMIN — GABAPENTIN 300 MG: 300 CAPSULE ORAL at 02:10

## 2023-10-10 RX ADMIN — Medication 1 TABLET: at 09:10

## 2023-10-10 RX ADMIN — PANTOPRAZOLE SODIUM 40 MG: 40 TABLET, DELAYED RELEASE ORAL at 08:10

## 2023-10-10 RX ADMIN — POLYETHYLENE GLYCOL 3350 17 G: 17 POWDER, FOR SOLUTION ORAL at 09:10

## 2023-10-10 RX ADMIN — OXYCODONE HYDROCHLORIDE 10 MG: 10 TABLET ORAL at 05:10

## 2023-10-10 RX ADMIN — FOLIC ACID 1 MG: 1 TABLET ORAL at 08:10

## 2023-10-10 RX ADMIN — GABAPENTIN 300 MG: 300 CAPSULE ORAL at 09:10

## 2023-10-10 RX ADMIN — OXYCODONE HYDROCHLORIDE 10 MG: 10 TABLET ORAL at 02:10

## 2023-10-10 RX ADMIN — Medication 6 MG: at 09:10

## 2023-10-10 RX ADMIN — THERA TABS 1 TABLET: TAB at 08:10

## 2023-10-10 RX ADMIN — Medication 1 TABLET: at 08:10

## 2023-10-10 RX ADMIN — OXYCODONE HYDROCHLORIDE 10 MG: 10 TABLET ORAL at 12:10

## 2023-10-10 NOTE — PLAN OF CARE
Therapy notes continue to recommend inpt rehab.    waiting on funding source or for pt to improve enough that home is a safe option

## 2023-10-10 NOTE — PT/OT/SLP PROGRESS
Physical Therapy Treatment    Patient Name:  José Henry   MRN:  [id number]    Recommendations:     Discharge Recommendations:  rehabilitation facility   Discharge Equipment Recommendations: platform, walker, rolling, bath bench, bedside commode     Subjective     Patient awake and alert.     Objective:     General Precautions: Standard, fall   Orthopedic Precautions:spinal precautions, RLE posterior precautions, RLE non weight bearing, RUE non weight bearing   Braces:    Respiratory Status: Room air  Communicated with nurse prior to treatment.     Functional Mobility:    Rolling:Independent  Supine to sit:Independent  Sit to stand transfer: Stand-by Assistance  Bed to chair transfer:Stand-by Assistance    Gait 100 ft x 2 with PRW NWB RUE/LE with CAM boot. Pt performed LE PRE's to increase strenth, ROM, and endurance to improve overall independence.  HEP given and instructed to performed EOB. TLSO >30 degrees donned, CAM boot and left hip precautions maintained.     Education Provided:  Role and goals of PT, transfer training, bed mobility, gait training, balance training, safety awareness, assistive device, strengthening exercises, and importance of participating in PT to return to PLOF.         Skin Integrity: Visible skin intact    PT interventions performed during the course of today's session in an effort to prevent and/or reduce acquired pressure injuries:   Therapeutic positioning completed       GOALS:   Multidisciplinary Problems       Physical Therapy Goals          Problem: Physical Therapy    Goal Priority Disciplines Outcome Goal Variances Interventions   Physical Therapy Goal     PT, PT/OT Ongoing, Progressing     Description: Goals to be met by: 2023     Patient will increase functional independence with mobility by performin. Supine to sit with Modified [place]  2. Sit to stand transfer with Modified [place]  3. Gait  x 150 feet with Modified [place] using platform RW.                           Assessment:     José Henry is a 47 y.o. male admitted with a medical diagnosis of <principal problem not specified>.     Patient Active Problem List   Diagnosis    Closed fracture of third lumbar vertebra    Radius fracture    Closed right hip fracture    Displaced fracture of body of right talus, initial encounter for closed fracture    Closed nondisplaced fracture of right calcaneus    Closed fracture of right femur    Epidural hematoma    Spinal stenosis of lumbar region    Ileus        Rehab Prognosis: Good; patient would benefit from acute skilled PT services to address these deficits and reach maximum level of function.    Recent Surgery: Procedure(s) (LRB):  ARTHROPLASTY, HIP (Right) 11 Days Post-Op    Plan:     During this hospitalization, patient to be seen 6 x/week to address the identified rehab impairments via gait training, therapeutic activities, therapeutic exercises, neuromuscular re-education and progress toward the following goals:    Plan of Care Expires:  11/01/23    Billable Minutes     Billable Minutes: Gait Training 15 and Therapeutic Exercise 15    Treatment Type: Treatment  PT/PTA: PTA     Number of PTA visits since last PT visit: 2     10/10/2023

## 2023-10-10 NOTE — PROGRESS NOTES
"   Trauma Surgery   Progress Note  Admit Date: 9/23/2023  HD#17  POD#11 Days Post-Op    Subjective:   Interval history:  NAEON  AFVSS  Patient is having Bms, and passing flatus  UOP appropriate  Tolerating regular diet without n/v  PT recommending rehab vs. Home with HH  Pain well controlled     Home Meds:   Current Outpatient Medications   Medication Instructions    dextroamphetamine-amphetamine 30 mg Tab 1 tablet, Oral, Daily    pantoprazole (PROTONIX) 40 MG tablet 1 tablet, Oral, Daily      Scheduled Meds:   calcium-vitamin D3  1 tablet Oral BID    docusate  100 mg Oral BID    enoxparin  40 mg Subcutaneous Q12H (prophylaxis, 0900/2100)    folic acid  1 mg Oral Daily    gabapentin  300 mg Oral TID    multivitamin  1 tablet Oral Daily    pantoprazole  40 mg Oral Daily    polyethylene glycol  17 g Oral BID    thiamine  100 mg Oral Daily     Continuous Infusions:      PRN Meds:bisacodyL, heparin, porcine (PF), melatonin, metoclopramide HCl, midazolam, ondansetron, oxyCODONE, promethazine     Objective:     VITAL SIGNS: 24 HR MIN & MAX LAST   Temp  Min: 97.5 °F (36.4 °C)  Max: 98.8 °F (37.1 °C)  97.5 °F (36.4 °C)   BP  Min: 105/71  Max: 113/74  105/71    Pulse  Min: 82  Max: 99  86    Resp  Min: 17  Max: 18  18    SpO2  Min: 97 %  Max: 99 %  98 %      HT: 6' 2.02" (188 cm)  WT: 93.9 kg (207 lb)  BMI: 26.6     Intake/output:  Intake/Output - Last 3 Shifts         10/08 0700  10/09 0659 10/09 0700  10/10 0659    P.O.  1320    Total Intake(mL/kg)  1320 (14.1)    Urine (mL/kg/hr) 1200 (0.5) 2725 (1.2)    Total Output 1200 2725    Net -1200 -1405          Urine Occurrence 1 x     Stool Occurrence 2 x             Intake/Output Summary (Last 24 hours) at 10/10/2023 0635  Last data filed at 10/10/2023 0559  Gross per 24 hour   Intake 1320 ml   Output 2725 ml   Net -1405 ml         Lines/drains/airway:       Peripheral IV - Single Lumen 09/26/23 2200 Anterior;Left Forearm (Active)   Site Assessment Clean;Dry;Intact;No " redness;No swelling;No warmth;No drainage 10/01/23 0400   Extremity Assessment Distal to IV No abnormal discoloration 10/01/23 0400   Line Status Infusing 10/01/23 0400   Dressing Status Clean;Dry;Intact 10/01/23 0400   Dressing Intervention Integrity maintained 10/01/23 0400   Number of days: 4            Peripheral IV - Single Lumen 09/27/23 0728 18 G Left Hand (Active)   Site Assessment Clean;Dry;Intact;No redness;No swelling;No warmth;No drainage 10/01/23 0400   Extremity Assessment Distal to IV No abnormal discoloration 10/01/23 0400   Line Status Saline locked 10/01/23 0400   Dressing Status Clean;Dry;Intact 10/01/23 0400   Dressing Intervention Integrity maintained 10/01/23 0400   Number of days: 4            NG/OG Tube 09/27/23 1730 nasogastric Left nostril (Active)   Placement Check placement verified by aspirate characteristics 10/01/23 0400   Distal Tube Length (cm) 53 10/01/23 0000   Tolerance no signs/symptoms of discomfort 10/01/23 0400   Securement secured to nostril center w/ adhesive device 10/01/23 0400   Clamp Status/Tolerance unclamped;no emesis;no nausea;no restlessness 10/01/23 0400   Suction Setting/Drainage Method suction at;low;intermittent setting 10/01/23 0400   Insertion Site Appearance no redness, warmth, tenderness, skin breakdown, drainage 10/01/23 0400   Drainage Bile;Green 10/01/23 0400   Flush/Irrigation flushed w/;water;no resistance met 09/29/23 2000   Tube Output(mL)(Include Discarded Residual) 250 mL 10/01/23 0528   Number of days: 3       Male External Urinary Catheter 09/30/23 0500 (Active)   Collection Container Urimeter 10/01/23 0400   Securement Method secured to top of thigh w/ adhesive device 10/01/23 0400   Skin no redness;no breakdown;penis/scrotum cleansed w/ soap and water 10/01/23 0400   Tolerance no signs/symptoms of discomfort;assisted with appliance change 10/01/23 0400   Output (mL) 100 mL 10/01/23 0528   Catheter Change Date 09/30/23 10/01/23 0400   Catheter  "Change Time 0430 10/01/23 0400   Number of days: 1       Physical examination:  Gen: NAD, AAOx3, answering questions appropriately  HEENT: normocephalic, atraumatic, EOMI, PERRL  CV: RR  Resp: NWOB  Abd: S/distended, nontender  Msk: moving all extremities spontaneously and purposefully- reduced to RUE/RLE. Ortho dressings removed  Neuro: CN II-XII grossly intact GCS 15  Skin/wounds: warm dry, dressings to RUE RLE    Labs:  Renal:  Recent Labs     10/09/23  0519   BUN 9.1   CREATININE 0.80     No results for input(s): "LACTIC" in the last 72 hours.  FEN/GI:  Recent Labs     10/09/23  0519   *   K 4.9   CO2 25   CALCIUM 8.4   MG 2.20   PHOS 5.0*   ALBUMIN 2.9*   BILITOT 0.7   AST 34   ALKPHOS 144   ALT 56*     Heme:  Recent Labs     10/09/23  0519   HGB 8.5*   HCT 26.2*   *     ID:  Recent Labs     10/09/23  0519   WBC 13.1  13.14*     CBG:  Recent Labs     10/09/23  0519   GLUCOSE 108*      No results for input(s): "POCTGLUCOSE" in the last 72 hours.   Cardiovascular:  No results for input(s): "TROPONINI", "CKTOTAL", "CKMB", "BNP" in the last 168 hours.  I have reviewed all pertinent lab results within the past 24 hours.    Imaging:  CT Abdomen Pelvis With Contrast   Final Result      1. Moderate dilatation of small-bowel loops with gradual transition to normal caliber small bowel distally.  Favor ileus over obstruction.   2. Fluid-filled colon which could be seen with a mild colitis or other diarrheal illness.   3. Enteric tube tip just past the GE junction.  Suggest advancing at least 5 cm if possible.   4. Other findings above.         Electronically signed by: Ritchie Coker   Date:    10/02/2023   Time:    13:04      X-Ray Abdomen AP 1 View   Final Result      As above.         Electronically signed by: Stevo Thorne   Date:    10/01/2023   Time:    09:36      X-Ray Hip 1 View Right (with Pelvis when performed)   Final Result      Expected postoperative changes.         Electronically signed " by: Stevo Thorne   Date:    09/29/2023   Time:    18:23      X-Ray Chest 1 View   Final Result      XR Gastric tube check, non-radiologist performed   Final Result      Nasogastric tube terminates within the gastric fundus.         Electronically signed by: Desmond Villa   Date:    09/27/2023   Time:    17:51      SURG FL Surgery Fluoro Usage   Final Result      X-Ray Abdomen AP 1 View   Final Result      Progressive gaseous distension of the bowel.  Consider ileus versus distal obstruction.         Electronically signed by: Kathie Atwood   Date:    09/27/2023   Time:    06:42      MRI Lumbar Spine Without Contrast   Final Result      1. L3 vertebral body superior half acute compression deformity with paraspinal soft inflammations.  Combination retropulsed bony fragment and ventral epidural hematoma results in central canal stenosis.      2. Lumbar degenerative disc disease and spondylosis level by level discussed above.         Electronically signed by: Desmond Villa   Date:    09/23/2023   Time:    15:40      MRI Thoracic Spine Without Contrast   Final Result      No evidence for acute osseous finding or static listhesis      No significant herniation or bulge.  No canal, cord, or foraminal compromise      Mild thoracic spondylosis.         Electronically signed by: Jose Antonio Mak MD   Date:    09/23/2023   Time:    15:33      CT Ankle (Including Hindfoot) Without Contrast Right   Final Result      Calcaneus fracture as described above.         Electronically signed by: Jose Antonio Mak MD   Date:    09/23/2023   Time:    14:21      X-Ray Hand 3 view Left   Final Result      No acute osseous abnormality identified.         Electronically signed by: Desmond Villa   Date:    09/23/2023   Time:    13:53      X-Ray Knee Complete 4 or More Views Left   Final Result      No acute osseous abnormality identified.         Electronically signed by: Desmond Villa   Date:    09/23/2023   Time:    13:51      Xray Previous    Final Result      Xray Previous   Final Result      Xray Previous   Final Result      CT Previous   Final Result      CT Previous   Final Result      CT Previous   Final Result      CT Previous   Final Result      X-Ray Pelvis Routine AP   Final Result      Right subcapital femoral neck fracture.         Electronically signed by: Jose Antonio Mak MD   Date:    09/23/2023   Time:    12:31      X-Ray Femur Ap/Lat Right   Final Result      Right subcapital femoral neck fracture.         Electronically signed by: Jose Antonio Mak MD   Date:    09/23/2023   Time:    12:27      X-Ray Wrist Complete Right   Final Result      Mildly displaced dorsal triquetral fracture.      Nondisplaced distal radius fracture.         Electronically signed by: Jose Antonio Mak MD   Date:    09/23/2023   Time:    11:45      X-Ray Ankle Complete Right   Final Result      Fractured calcaneus.         Electronically signed by: Desmond Villa   Date:    09/23/2023   Time:    11:45         I have reviewed all pertinent imaging results/findings within the past 24 hours.    Micro/Path/Other:  Microbiology Results (last 7 days)       Procedure Component Value Units Date/Time    Blood Culture [[id number]]  (Normal) Collected: 10/03/23 0824    Order Status: Completed Specimen: Blood Updated: 10/08/23 1000     CULTURE, BLOOD ([facility]) No Growth at 5 days           Specimen (168h ago, onward)      None             Problems list:  Active Problem List with Overview Notes    Diagnosis Date Noted    Ileus 10/03/2023    Closed nondisplaced fracture of right calcaneus 09/24/2023    Closed fracture of right femur 09/24/2023    Epidural hematoma 09/24/2023    Spinal stenosis of lumbar region 09/24/2023    Closed fracture of third lumbar vertebra 09/23/2023    Radius fracture 09/23/2023    Closed right hip fracture 09/23/2023    Displaced fracture of body of right talus, initial encounter for closed fracture 09/23/2023        Assessment & Plan:   47 y.o. male admitted on  9/23/2023 following  fell or slept wall fall from a balcony of approximally 12th and 13th feet.  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      Consults:   Neurosurgery  Orthopedic surgery   Therapy:  Physical Therapy  Occupational Therapy Weight bearing status:   NWB RLE, RUE Precautions:  Fall and Spinal   Seizure prophylaxis:  Not indicated. VTE prophylaxis:     Prophylactic Lovenox 40mg BID GI prophylaxis:  H2B   Outpatient follow up:  Orthopedic surgery  Neurosurgery  Disposition:  Rehab vs HH w/ DME       -Tolerating regular diet  -Ambulating well with PT, recommending rehab vs. Home with HH:   -will need 3 week fu w/ Ortho for calcaneous/wrist fx   -Case management assisting with medicaid approval  - PO and IV pain meds  -  PO PPI daily  - Lovenox for DVT prophylaxis    Lalit Pike MD   [employer] General Surgery, PGY-1

## 2023-10-11 PROCEDURE — 97530 THERAPEUTIC ACTIVITIES: CPT | Mod: CO

## 2023-10-11 PROCEDURE — 25000003 PHARM REV CODE 250: Performed by: NURSE PRACTITIONER

## 2023-10-11 PROCEDURE — 97116 GAIT TRAINING THERAPY: CPT | Mod: CQ

## 2023-10-11 PROCEDURE — 97535 SELF CARE MNGMENT TRAINING: CPT | Mod: CO

## 2023-10-11 PROCEDURE — 11000001 HC ACUTE MED/SURG PRIVATE ROOM

## 2023-10-11 PROCEDURE — 21400001 HC TELEMETRY ROOM

## 2023-10-11 PROCEDURE — 63600175 PHARM REV CODE 636 W HCPCS: Performed by: NURSE PRACTITIONER

## 2023-10-11 RX ADMIN — ENOXAPARIN SODIUM 40 MG: 40 INJECTION SUBCUTANEOUS at 08:10

## 2023-10-11 RX ADMIN — DOCUSATE SODIUM 100 MG: 50 LIQUID ORAL at 08:10

## 2023-10-11 RX ADMIN — ENOXAPARIN SODIUM 40 MG: 40 INJECTION SUBCUTANEOUS at 09:10

## 2023-10-11 RX ADMIN — Medication 1 TABLET: at 08:10

## 2023-10-11 RX ADMIN — GABAPENTIN 300 MG: 300 CAPSULE ORAL at 09:10

## 2023-10-11 RX ADMIN — OXYCODONE HYDROCHLORIDE 10 MG: 10 TABLET ORAL at 07:10

## 2023-10-11 RX ADMIN — Medication 1 TABLET: at 09:10

## 2023-10-11 RX ADMIN — OXYCODONE HYDROCHLORIDE 10 MG: 10 TABLET ORAL at 11:10

## 2023-10-11 RX ADMIN — THERA TABS 1 TABLET: TAB at 09:10

## 2023-10-11 RX ADMIN — GABAPENTIN 300 MG: 300 CAPSULE ORAL at 08:10

## 2023-10-11 RX ADMIN — OXYCODONE HYDROCHLORIDE 10 MG: 10 TABLET ORAL at 03:10

## 2023-10-11 RX ADMIN — PANTOPRAZOLE SODIUM 40 MG: 40 TABLET, DELAYED RELEASE ORAL at 09:10

## 2023-10-11 RX ADMIN — OXYCODONE HYDROCHLORIDE 10 MG: 10 TABLET ORAL at 08:10

## 2023-10-11 RX ADMIN — POLYETHYLENE GLYCOL 3350 17 G: 17 POWDER, FOR SOLUTION ORAL at 08:10

## 2023-10-11 RX ADMIN — THIAMINE HCL TAB 100 MG 100 MG: 100 TAB at 09:10

## 2023-10-11 RX ADMIN — GABAPENTIN 300 MG: 300 CAPSULE ORAL at 03:10

## 2023-10-11 RX ADMIN — POLYETHYLENE GLYCOL 3350 17 G: 17 POWDER, FOR SOLUTION ORAL at 09:10

## 2023-10-11 RX ADMIN — DOCUSATE SODIUM 100 MG: 50 LIQUID ORAL at 09:10

## 2023-10-11 RX ADMIN — FOLIC ACID 1 MG: 1 TABLET ORAL at 09:10

## 2023-10-11 NOTE — PROGRESS NOTES
"   Trauma Surgery   Progress Note  Admit Date: 9/23/2023  HD#18  POD#12 Days Post-Op    Subjective:   Interval history:  NAEON  AFVSS  Patient is having Bms, and passing flatus  UOP appropriate  Tolerating regular diet without n/v  PT recommending rehab vs. Home with HH  Pain well controlled     Home Meds:   Current Outpatient Medications   Medication Instructions    dextroamphetamine-amphetamine 30 mg Tab 1 tablet, Oral, Daily    pantoprazole (PROTONIX) 40 MG tablet 1 tablet, Oral, Daily      Scheduled Meds:   calcium-vitamin D3  1 tablet Oral BID    docusate  100 mg Oral BID    enoxparin  40 mg Subcutaneous Q12H (prophylaxis, 0900/2100)    folic acid  1 mg Oral Daily    gabapentin  300 mg Oral TID    multivitamin  1 tablet Oral Daily    pantoprazole  40 mg Oral Daily    polyethylene glycol  17 g Oral BID    thiamine  100 mg Oral Daily     Continuous Infusions:      PRN Meds:bisacodyL, heparin, porcine (PF), melatonin, metoclopramide HCl, midazolam, ondansetron, oxyCODONE, promethazine     Objective:     VITAL SIGNS: 24 HR MIN & MAX LAST   Temp  Min: 98.1 °F (36.7 °C)  Max: 99.2 °F (37.3 °C)  98.5 °F (36.9 °C)   BP  Min: 103/71  Max: 120/76  103/71    Pulse  Min: 88  Max: 107  88    Resp  Min: 17  Max: 18  18    SpO2  Min: 95 %  Max: 100 %  95 %      HT: 6' 2.02" (188 cm)  WT: 93.9 kg (207 lb)  BMI: 26.6     Intake/output:  Intake/Output - Last 3 Shifts         10/09 0700  10/10 0659 10/10 0700  10/11 0659    P.O. 1320 960    Total Intake(mL/kg) 1320 (14.1) 960 (10.2)    Urine (mL/kg/hr) 2725 (1.2) 1600 (0.7)    Total Output 2725 1600    Net -1405 -640                  Intake/Output Summary (Last 24 hours) at 10/11/2023 0630  Last data filed at 10/11/2023 0625  Gross per 24 hour   Intake 960 ml   Output 1600 ml   Net -640 ml           Lines/drains/airway:       Peripheral IV - Single Lumen 09/26/23 2200 Anterior;Left Forearm (Active)   Site Assessment Clean;Dry;Intact;No redness;No swelling;No warmth;No drainage " 10/01/23 0400   Extremity Assessment Distal to IV No abnormal discoloration 10/01/23 0400   Line Status Infusing 10/01/23 0400   Dressing Status Clean;Dry;Intact 10/01/23 0400   Dressing Intervention Integrity maintained 10/01/23 0400   Number of days: 4            Peripheral IV - Single Lumen 09/27/23 0728 18 G Left Hand (Active)   Site Assessment Clean;Dry;Intact;No redness;No swelling;No warmth;No drainage 10/01/23 0400   Extremity Assessment Distal to IV No abnormal discoloration 10/01/23 0400   Line Status Saline locked 10/01/23 0400   Dressing Status Clean;Dry;Intact 10/01/23 0400   Dressing Intervention Integrity maintained 10/01/23 0400   Number of days: 4            NG/OG Tube 09/27/23 1730 nasogastric Left nostril (Active)   Placement Check placement verified by aspirate characteristics 10/01/23 0400   Distal Tube Length (cm) 53 10/01/23 0000   Tolerance no signs/symptoms of discomfort 10/01/23 0400   Securement secured to nostril center w/ adhesive device 10/01/23 0400   Clamp Status/Tolerance unclamped;no emesis;no nausea;no restlessness 10/01/23 0400   Suction Setting/Drainage Method suction at;low;intermittent setting 10/01/23 0400   Insertion Site Appearance no redness, warmth, tenderness, skin breakdown, drainage 10/01/23 0400   Drainage Bile;Green 10/01/23 0400   Flush/Irrigation flushed w/;water;no resistance met 09/29/23 2000   Tube Output(mL)(Include Discarded Residual) 250 mL 10/01/23 0528   Number of days: 3       Male External Urinary Catheter 09/30/23 0500 (Active)   Collection Container Urimeter 10/01/23 0400   Securement Method secured to top of thigh w/ adhesive device 10/01/23 0400   Skin no redness;no breakdown;penis/scrotum cleansed w/ soap and water 10/01/23 0400   Tolerance no signs/symptoms of discomfort;assisted with appliance change 10/01/23 0400   Output (mL) 100 mL 10/01/23 0528   Catheter Change Date 09/30/23 10/01/23 0400   Catheter Change Time 0430 10/01/23 0400   Number of  "days: 1       Physical examination:  Gen: NAD, AAOx3, answering questions appropriately  HEENT: normocephalic, atraumatic, EOMI, PERRL  CV: RR  Resp: NWOB  Abd: S/distended, nontender  Msk: moving all extremities spontaneously and purposefully- reduced to RUE/RLE. Ortho dressings removed  Neuro: CN II-XII grossly intact GCS 15  Skin/wounds: warm dry, dressings to RUE RLE    Labs:  Renal:  Recent Labs     10/09/23  0519   BUN 9.1   CREATININE 0.80       No results for input(s): "LACTIC" in the last 72 hours.  FEN/GI:  Recent Labs     10/09/23  0519   *   K 4.9   CO2 25   CALCIUM 8.4   MG 2.20   PHOS 5.0*   ALBUMIN 2.9*   BILITOT 0.7   AST 34   ALKPHOS 144   ALT 56*       Heme:  Recent Labs     10/09/23  0519   HGB 8.5*   HCT 26.2*   *       ID:  Recent Labs     10/09/23  0519   WBC 13.1  13.14*       CBG:  Recent Labs     10/09/23  0519   GLUCOSE 108*        No results for input(s): "POCTGLUCOSE" in the last 72 hours.   Cardiovascular:  No results for input(s): "TROPONINI", "CKTOTAL", "CKMB", "BNP" in the last 168 hours.  I have reviewed all pertinent lab results within the past 24 hours.    Imaging:  CT Abdomen Pelvis With Contrast   Final Result      1. Moderate dilatation of small-bowel loops with gradual transition to normal caliber small bowel distally.  Favor ileus over obstruction.   2. Fluid-filled colon which could be seen with a mild colitis or other diarrheal illness.   3. Enteric tube tip just past the GE junction.  Suggest advancing at least 5 cm if possible.   4. Other findings above.         Electronically signed by: Ritchie Coker   Date:    10/02/2023   Time:    13:04      X-Ray Abdomen AP 1 View   Final Result      As above.         Electronically signed by: Stevo Thorne   Date:    10/01/2023   Time:    09:36      X-Ray Hip 1 View Right (with Pelvis when performed)   Final Result      Expected postoperative changes.         Electronically signed by: Stevo Thorne   Date:    09/29/2023 "   Time:    18:23      X-Ray Chest 1 View   Final Result      XR Gastric tube check, non-radiologist performed   Final Result      Nasogastric tube terminates within the gastric fundus.         Electronically signed by: Desmond Villa   Date:    09/27/2023   Time:    17:51      SURG FL Surgery Fluoro Usage   Final Result      X-Ray Abdomen AP 1 View   Final Result      Progressive gaseous distension of the bowel.  Consider ileus versus distal obstruction.         Electronically signed by: Kathie Atwood   Date:    09/27/2023   Time:    06:42      MRI Lumbar Spine Without Contrast   Final Result      1. L3 vertebral body superior half acute compression deformity with paraspinal soft inflammations.  Combination retropulsed bony fragment and ventral epidural hematoma results in central canal stenosis.      2. Lumbar degenerative disc disease and spondylosis level by level discussed above.         Electronically signed by: Desmond Villa   Date:    09/23/2023   Time:    15:40      MRI Thoracic Spine Without Contrast   Final Result      No evidence for acute osseous finding or static listhesis      No significant herniation or bulge.  No canal, cord, or foraminal compromise      Mild thoracic spondylosis.         Electronically signed by: Jose Antonio Mak MD   Date:    09/23/2023   Time:    15:33      CT Ankle (Including Hindfoot) Without Contrast Right   Final Result      Calcaneus fracture as described above.         Electronically signed by: Jose Antonio Mak MD   Date:    09/23/2023   Time:    14:21      X-Ray Hand 3 view Left   Final Result      No acute osseous abnormality identified.         Electronically signed by: Desmond Villa   Date:    09/23/2023   Time:    13:53      X-Ray Knee Complete 4 or More Views Left   Final Result      No acute osseous abnormality identified.         Electronically signed by: Desmond Villa   Date:    09/23/2023   Time:    13:51      Xray Previous   Final Result      Xray Previous   Final  Result      Xray Previous   Final Result      CT Previous   Final Result      CT Previous   Final Result      CT Previous   Final Result      CT Previous   Final Result      X-Ray Pelvis Routine AP   Final Result      Right subcapital femoral neck fracture.         Electronically signed by: Jose Antonio Mak MD   Date:    09/23/2023   Time:    12:31      X-Ray Femur Ap/Lat Right   Final Result      Right subcapital femoral neck fracture.         Electronically signed by: Jose Antonio Mak MD   Date:    09/23/2023   Time:    12:27      X-Ray Wrist Complete Right   Final Result      Mildly displaced dorsal triquetral fracture.      Nondisplaced distal radius fracture.         Electronically signed by: Jose Antonio Mak MD   Date:    09/23/2023   Time:    11:45      X-Ray Ankle Complete Right   Final Result      Fractured calcaneus.         Electronically signed by: Desmond Villa   Date:    09/23/2023   Time:    11:45         I have reviewed all pertinent imaging results/findings within the past 24 hours.    Micro/Path/Other:  Microbiology Results (last 7 days)       Procedure Component Value Units Date/Time    Blood Culture [[id number]]  (Normal) Collected: 10/03/23 0824    Order Status: Completed Specimen: Blood Updated: 10/08/23 1000     CULTURE, BLOOD ([facility]) No Growth at 5 days           Specimen (168h ago, onward)      None             Problems list:  Active Problem List with Overview Notes    Diagnosis Date Noted    Ileus 10/03/2023    Closed nondisplaced fracture of right calcaneus 09/24/2023    Closed fracture of right femur 09/24/2023    Epidural hematoma 09/24/2023    Spinal stenosis of lumbar region 09/24/2023    Closed fracture of third lumbar vertebra 09/23/2023    Radius fracture 09/23/2023    Closed right hip fracture 09/23/2023    Displaced fracture of body of right talus, initial encounter for closed fracture 09/23/2023        Assessment & Plan:   47 y.o. male admitted on 9/23/2023 following  fell or slept wall  fall from a balcony of approximally 12th and 13th feet.  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      Consults:   Neurosurgery  Orthopedic surgery   Therapy:  Physical Therapy  Occupational Therapy Weight bearing status:   NWB RLE, RUE Precautions:  Fall and Spinal   Seizure prophylaxis:  Not indicated. VTE prophylaxis:     Prophylactic Lovenox 40mg BID GI prophylaxis:  H2B   Outpatient follow up:  Orthopedic surgery  Neurosurgery  Disposition:  Rehab vs HH w/ DME       -Tolerating regular diet  -Ambulating well with PT, recommending rehab vs. Home with HH:   -will need 3 week fu w/ Ortho for calcaneous/wrist fx   -Case management assisting with medicaid approval  - PO and IV pain meds  -  PO PPI daily  - Lovenox for DVT prophylaxis    Lalit Pike MD   [employer] General Surgery, PGY-1

## 2023-10-11 NOTE — PT/OT/SLP PROGRESS
Occupational Therapy   Treatment    Name: José Henry  MRN: [id number]  Admitting Diagnosis:  Closed fx of 3rd lumbar vertebra  12 Days Post-Op    Recommendations:     Discharge Recommendations: rehabilitation facility, home with home health  Discharge Equipment Recommendations:  platform, walker, rolling, bedside commode, bath bench  Barriers to discharge:       Assessment:     José Henry is a 47 y.o. male with a medical diagnosis of closed fx of 3rd lumbar vertebra. Performance deficits affecting function are weakness, gait instability, impaired endurance, impaired balance, decreased lower extremity function, decreased upper extremity function, impaired self care skills, impaired functional mobility, orthopedic precautions.    Rehab Prognosis:  Good; patient would benefit from acute skilled OT services to address these deficits and reach maximum level of function.       Plan:     Patient to be seen 5 x/week to address the above listed problems via self-care/home management, therapeutic activities, therapeutic exercises  Plan of Care Expires: 10/29/23  Plan of Care Reviewed with: patient    Subjective     Pain/Comfort:  Pain Rating 1: 0/10    Objective:     Communicated with: RN prior to session.  Patient found supine with telemetry upon OT entry to room.    General Precautions: Standard, fall    Orthopedic Precautions:spinal precautions, RLE posterior precautions, RUE non weight bearing, RLE non weight bearing  Braces: TLSO (CAM boot RLE)  Respiratory Status: Room air     Occupational Performance:     Bed Mobility:    Patient completed Supine to Sit with modified independence     Functional Mobility/Transfers:  Patient completed Sit <> Stand Transfer with contact guard assistance  with  platform walker   Functional Mobility: pt walked in hallway ~150 ft with CGA-SBA and PRW. Needed several standing rest breaks 2/2 fatigue. All pxns maintained.     Activities of Daily Living:  Bathing: pt performed bath bench  t/f with SBA. VC provided for technique.   UE dressing: donned TLSO with SPV.   LE dressing: max A to don CAM boot. Limited 2/2 spinal and hip pxns.     Therapeutic Positioning    OT interventions performed during the course of today's session in an effort to prevent and/or reduce acquired pressure injuries:   Education was provided on benefits of and recommendations for therapeutic positioning    Skin assessment: all bony prominences were assessed    Findings: no redness or breakdown noted    AMPA 6 Click ADL: 21    Patient Education:  Patient provided with verbal education education regarding OT role/goals/POC, fall prevention, and safety awareness.  Understanding was verbalized.      Patient left up in chair with all lines intact and call button in reach    GOALS:   Multidisciplinary Problems       Occupational Therapy Goals          Problem: Occupational Therapy    Goal Priority Disciplines Outcome Interventions   Occupational Therapy Goal     OT, PT/OT Ongoing, Progressing    Description: Goals to be met by 10/29/23:    Pt will complete grooming standing at sink with LRAD with SBA.  Pt will complete UB dressing with SBA.  Pt will complete LB dressing with SBA using AE  Pt will complete toileting with SBA using LRAD.  Pt will complete functional mobility to/from toilet and toilet transfer with SBA using LRAD.                          Time Tracking:     OT Date of Treatment: 10/11/23  OT Start Time: 0926  OT Stop Time: 0949  OT Total Time (min): 23 min    Billable Minutes:Self Care/Home Management 15  Therapeutic Activity 8    OT/JOSE: JOSE     Number of JOSE visits since last OT visit: 4    10/11/2023

## 2023-10-11 NOTE — PT/OT/SLP PROGRESS
Physical Therapy Treatment    Patient Name:  José Henry   MRN:  [id number]    Recommendations:     Discharge Recommendations:  rehabilitation facility   Discharge Equipment Recommendations: platform, walker, rolling, bath bench, bedside commode     Subjective     Patient awake and alert.     Objective:     General Precautions: Standard, fall   Orthopedic Precautions:spinal precautions, RLE posterior precautions, RLE non weight bearing, RUE non weight bearing   Braces:    Respiratory Status: Room air  Communicated with nurse prior to treatment.     Functional Mobility:    Rolling:Independent  Supine to sit:Independent  Sit to stand transfer: Stand-by Assistance  Bed to chair transfer:Stand-by Assistance    Gait 90 ft with PRW CGA and no LOB today but limited gait due to fatigue and request to sit. He will perform HEP independently @ bedside. CAM boot doffed after session and heel floated.     Education Provided:  Role and goals of PT, transfer training, bed mobility, gait training, balance training, safety awareness, assistive device, strengthening exercises, and importance of participating in PT to return to PLOF.         Skin Integrity: Visible skin intact    PT interventions performed during the course of today's session in an effort to prevent and/or reduce acquired pressure injuries:   Therapeutic positioning completed       GOALS:   Multidisciplinary Problems       Physical Therapy Goals          Problem: Physical Therapy    Goal Priority Disciplines Outcome Goal Variances Interventions   Physical Therapy Goal     PT, PT/OT Ongoing, Progressing     Description: Goals to be met by: 2023     Patient will increase functional independence with mobility by performin. Supine to sit with Modified [place]  2. Sit to stand transfer with Modified [place]  3. Gait  x 150 feet with Modified [place] using platform RW.                          Assessment:     José Henry is a 47 y.o. male  admitted with a medical diagnosis of <principal problem not specified>.     Patient Active Problem List   Diagnosis    Closed fracture of third lumbar vertebra    Radius fracture    Closed right hip fracture    Displaced fracture of body of right talus, initial encounter for closed fracture    Closed nondisplaced fracture of right calcaneus    Closed fracture of right femur    Epidural hematoma    Spinal stenosis of lumbar region    Ileus        Rehab Prognosis: Good; patient would benefit from acute skilled PT services to address these deficits and reach maximum level of function.    Recent Surgery: Procedure(s) (LRB):  ARTHROPLASTY, HIP (Right) 12 Days Post-Op    Plan:     During this hospitalization, patient to be seen 6 x/week to address the identified rehab impairments via gait training, therapeutic activities, therapeutic exercises, neuromuscular re-education and progress toward the following goals:    Plan of Care Expires:  11/01/23    Billable Minutes     Billable Minutes: Gait Training 15    Treatment Type: Treatment  PT/PTA: PTA     Number of PTA visits since last PT visit: 3     10/11/2023

## 2023-10-12 LAB
ALBUMIN SERPL-MCNC: 2.9 G/DL (ref 3.5–5)
ALBUMIN/GLOB SERPL: 1 RATIO (ref 1.1–2)
ALP SERPL-CCNC: 111 UNIT/L (ref 40–150)
ALT SERPL-CCNC: 25 UNIT/L (ref 0–55)
AST SERPL-CCNC: 18 UNIT/L (ref 5–34)
BASOPHILS # BLD AUTO: 0.03 X10(3)/MCL
BASOPHILS NFR BLD AUTO: 0.4 %
BILIRUB SERPL-MCNC: 0.5 MG/DL
BUN SERPL-MCNC: 16 MG/DL (ref 8.9–20.6)
CALCIUM SERPL-MCNC: 8.4 MG/DL (ref 8.4–10.2)
CHLORIDE SERPL-SCNC: 103 MMOL/L (ref 98–107)
CO2 SERPL-SCNC: 28 MMOL/L (ref 22–29)
CREAT SERPL-MCNC: 0.85 MG/DL (ref 0.73–1.18)
CRP SERPL-MCNC: 28.5 MG/L
EOSINOPHIL # BLD AUTO: 0.11 X10(3)/MCL (ref 0–0.9)
EOSINOPHIL NFR BLD AUTO: 1.4 %
ERYTHROCYTE [DISTWIDTH] IN BLOOD BY AUTOMATED COUNT: 14.9 % (ref 11.5–17)
GFR SERPLBLD CREATININE-BSD FMLA CKD-EPI: >60 MLS/MIN/1.73/M2
GLOBULIN SER-MCNC: 2.9 GM/DL (ref 2.4–3.5)
GLUCOSE SERPL-MCNC: 106 MG/DL (ref 74–100)
HCT VFR BLD AUTO: 24.1 % (ref 42–52)
HGB BLD-MCNC: 7.6 G/DL (ref 14–18)
IMM GRANULOCYTES # BLD AUTO: 0.32 X10(3)/MCL (ref 0–0.04)
IMM GRANULOCYTES NFR BLD AUTO: 4.2 %
LYMPHOCYTES # BLD AUTO: 1.69 X10(3)/MCL (ref 0.6–4.6)
LYMPHOCYTES NFR BLD AUTO: 22.1 %
MAGNESIUM SERPL-MCNC: 2.1 MG/DL (ref 1.6–2.6)
MCH RBC QN AUTO: 28.8 PG (ref 27–31)
MCHC RBC AUTO-ENTMCNC: 31.5 G/DL (ref 33–36)
MCV RBC AUTO: 91.3 FL (ref 80–94)
MONOCYTES # BLD AUTO: 1.08 X10(3)/MCL (ref 0.1–1.3)
MONOCYTES NFR BLD AUTO: 14.1 %
NEUTROPHILS # BLD AUTO: 4.41 X10(3)/MCL (ref 2.1–9.2)
NEUTROPHILS NFR BLD AUTO: 57.8 %
NRBC BLD AUTO-RTO: 0 %
PHOSPHATE SERPL-MCNC: 4.1 MG/DL (ref 2.3–4.7)
PLATELET # BLD AUTO: 662 X10(3)/MCL (ref 130–400)
PMV BLD AUTO: 8.7 FL (ref 7.4–10.4)
POTASSIUM SERPL-SCNC: 4.2 MMOL/L (ref 3.5–5.1)
PREALB SERPL-MCNC: 18.1 MG/DL (ref 18–45)
PROT SERPL-MCNC: 5.8 GM/DL (ref 6.4–8.3)
RBC # BLD AUTO: 2.64 X10(6)/MCL (ref 4.7–6.1)
SODIUM SERPL-SCNC: 138 MMOL/L (ref 136–145)
WBC # SPEC AUTO: 7.64 X10(3)/MCL (ref 4.5–11.5)

## 2023-10-12 PROCEDURE — 84100 ASSAY OF PHOSPHORUS: CPT

## 2023-10-12 PROCEDURE — 86140 C-REACTIVE PROTEIN: CPT | Performed by: NURSE PRACTITIONER

## 2023-10-12 PROCEDURE — 21400001 HC TELEMETRY ROOM

## 2023-10-12 PROCEDURE — 11000001 HC ACUTE MED/SURG PRIVATE ROOM

## 2023-10-12 PROCEDURE — 97110 THERAPEUTIC EXERCISES: CPT | Mod: CQ

## 2023-10-12 PROCEDURE — 25000003 PHARM REV CODE 250: Performed by: NURSE PRACTITIONER

## 2023-10-12 PROCEDURE — 85025 COMPLETE CBC W/AUTO DIFF WBC: CPT

## 2023-10-12 PROCEDURE — 84134 ASSAY OF PREALBUMIN: CPT | Performed by: NURSE PRACTITIONER

## 2023-10-12 PROCEDURE — 97116 GAIT TRAINING THERAPY: CPT | Mod: CQ

## 2023-10-12 PROCEDURE — 63600175 PHARM REV CODE 636 W HCPCS: Performed by: NURSE PRACTITIONER

## 2023-10-12 PROCEDURE — 83735 ASSAY OF MAGNESIUM: CPT

## 2023-10-12 PROCEDURE — 80053 COMPREHEN METABOLIC PANEL: CPT

## 2023-10-12 RX ADMIN — GABAPENTIN 300 MG: 300 CAPSULE ORAL at 08:10

## 2023-10-12 RX ADMIN — OXYCODONE HYDROCHLORIDE 10 MG: 10 TABLET ORAL at 01:10

## 2023-10-12 RX ADMIN — OXYCODONE HYDROCHLORIDE 10 MG: 10 TABLET ORAL at 08:10

## 2023-10-12 RX ADMIN — FOLIC ACID 1 MG: 1 TABLET ORAL at 08:10

## 2023-10-12 RX ADMIN — ENOXAPARIN SODIUM 40 MG: 40 INJECTION SUBCUTANEOUS at 08:10

## 2023-10-12 RX ADMIN — OXYCODONE HYDROCHLORIDE 10 MG: 10 TABLET ORAL at 06:10

## 2023-10-12 RX ADMIN — Medication 1 TABLET: at 08:10

## 2023-10-12 RX ADMIN — THERA TABS 1 TABLET: TAB at 08:10

## 2023-10-12 RX ADMIN — PANTOPRAZOLE SODIUM 40 MG: 40 TABLET, DELAYED RELEASE ORAL at 08:10

## 2023-10-12 RX ADMIN — OXYCODONE HYDROCHLORIDE 10 MG: 10 TABLET ORAL at 04:10

## 2023-10-12 RX ADMIN — THIAMINE HCL TAB 100 MG 100 MG: 100 TAB at 09:10

## 2023-10-12 RX ADMIN — OXYCODONE HYDROCHLORIDE 10 MG: 10 TABLET ORAL at 10:10

## 2023-10-12 RX ADMIN — GABAPENTIN 300 MG: 300 CAPSULE ORAL at 03:10

## 2023-10-12 NOTE — PT/OT/SLP PROGRESS
Physical Therapy Treatment    Patient Name:  José Henry   MRN:  [id number]    Recommendations:     Discharge Recommendations:  rehabilitation facility   Discharge Equipment Recommendations: platform, walker, rolling, bath bench, bedside commode     Subjective     Patient awake and alert.     Objective:     General Precautions: Standard, fall   Orthopedic Precautions:spinal precautions, RLE posterior precautions, RLE non weight bearing, RUE non weight bearing   Braces:    Respiratory Status: Room air  Communicated with nurse prior to treatment.     Functional Mobility:    Rolling:Independent  Supine to sit:Independent  Sit to stand transfer: Stand-by Assistance  Bed to chair transfer:Stand-by Assistance    Pt performed LE PRE's to increase strenth, ROM, and endurance to improve overall independence. AAROM to increase right hip flexion to 90 degrees. Gait 80 ft with PRW SBA and CAM boot and TLSO when OOB.     Education Provided:  Role and goals of PT, transfer training, bed mobility, gait training, balance training, safety awareness, assistive device, strengthening exercises, and importance of participating in PT to return to PLOF.           Skin Integrity:  coverlet dressing over incision.     PT interventions performed during the course of today's session in an effort to prevent and/or reduce acquired pressure injuries:   Therapeutic positioning completed       GOALS:   Multidisciplinary Problems       Physical Therapy Goals          Problem: Physical Therapy    Goal Priority Disciplines Outcome Goal Variances Interventions   Physical Therapy Goal     PT, PT/OT Ongoing, Progressing     Description: Goals to be met by: 2023     Patient will increase functional independence with mobility by performin. Supine to sit with Modified [place]  2. Sit to stand transfer with Modified [place]  3. Gait  x 150 feet with Modified [place] using platform RW.                          Assessment:     José  Elaine is a 47 y.o. male admitted with a medical diagnosis of <principal problem not specified>.     Patient Active Problem List   Diagnosis    Closed fracture of third lumbar vertebra    Radius fracture    Closed right hip fracture    Displaced fracture of body of right talus, initial encounter for closed fracture    Closed nondisplaced fracture of right calcaneus    Closed fracture of right femur    Epidural hematoma    Spinal stenosis of lumbar region    Ileus        Rehab Prognosis: Good; patient would benefit from acute skilled PT services to address these deficits and reach maximum level of function.    Recent Surgery: Procedure(s) (LRB):  ARTHROPLASTY, HIP (Right) 13 Days Post-Op    Plan:     During this hospitalization, patient to be seen 6 x/week to address the identified rehab impairments via gait training, therapeutic activities, therapeutic exercises, neuromuscular re-education and progress toward the following goals:    Plan of Care Expires:  11/01/23    Billable Minutes     Billable Minutes: Gait Training 15 and Therapeutic Exercise 10    Treatment Type: Treatment  PT/PTA: PT     Number of PTA visits since last PT visit: 4     10/12/2023

## 2023-10-12 NOTE — PROGRESS NOTES
"   Trauma Surgery   Progress Note  Admit Date: 9/23/2023  HD#19  POD#13 Days Post-Op    Subjective:   Interval history:  NAEON  AFVSS  Patient is having Bms, and passing flatus  UOP appropriate  Tolerating regular diet without n/v  PT recommending rehab vs. Home with HH  Pain well controlled     Home Meds:   Current Outpatient Medications   Medication Instructions    dextroamphetamine-amphetamine 30 mg Tab 1 tablet, Oral, Daily    pantoprazole (PROTONIX) 40 MG tablet 1 tablet, Oral, Daily      Scheduled Meds:   calcium-vitamin D3  1 tablet Oral BID    docusate  100 mg Oral BID    enoxparin  40 mg Subcutaneous Q12H (prophylaxis, 0900/2100)    folic acid  1 mg Oral Daily    gabapentin  300 mg Oral TID    multivitamin  1 tablet Oral Daily    pantoprazole  40 mg Oral Daily    polyethylene glycol  17 g Oral BID    thiamine  100 mg Oral Daily     Continuous Infusions:      PRN Meds:bisacodyL, heparin, porcine (PF), melatonin, metoclopramide HCl, midazolam, ondansetron, oxyCODONE, promethazine     Objective:     VITAL SIGNS: 24 HR MIN & MAX LAST   Temp  Min: 97.8 °F (36.6 °C)  Max: 99.1 °F (37.3 °C)  97.8 °F (36.6 °C)   BP  Min: 95/66  Max: 113/73  102/66    Pulse  Min: 89  Max: 94  94    Resp  Min: 16  Max: 20  17    SpO2  Min: 94 %  Max: 98 %  (!) 94 %      HT: 6' 2.02" (188 cm)  WT: 93.9 kg (207 lb)  BMI: 26.6     Intake/output:  Intake/Output - Last 3 Shifts         10/10 0700  10/11 0659 10/11 0700  10/12 0659    P.O. 960 480    Total Intake(mL/kg) 960 (10.2) 480 (5.1)    Urine (mL/kg/hr) 1600 (0.7) 300 (0.1)    Total Output 1600 300    Net -640 +180                  Intake/Output Summary (Last 24 hours) at 10/12/2023 0604  Last data filed at 10/11/2023 0800  Gross per 24 hour   Intake 480 ml   Output 700 ml   Net -220 ml         Lines/drains/airway:       Peripheral IV - Single Lumen 09/26/23 2200 Anterior;Left Forearm (Active)   Site Assessment Clean;Dry;Intact;No redness;No swelling;No warmth;No drainage 10/01/23 " 0400   Extremity Assessment Distal to IV No abnormal discoloration 10/01/23 0400   Line Status Infusing 10/01/23 0400   Dressing Status Clean;Dry;Intact 10/01/23 0400   Dressing Intervention Integrity maintained 10/01/23 0400   Number of days: 4            Peripheral IV - Single Lumen 09/27/23 0728 18 G Left Hand (Active)   Site Assessment Clean;Dry;Intact;No redness;No swelling;No warmth;No drainage 10/01/23 0400   Extremity Assessment Distal to IV No abnormal discoloration 10/01/23 0400   Line Status Saline locked 10/01/23 0400   Dressing Status Clean;Dry;Intact 10/01/23 0400   Dressing Intervention Integrity maintained 10/01/23 0400   Number of days: 4            NG/OG Tube 09/27/23 1730 nasogastric Left nostril (Active)   Placement Check placement verified by aspirate characteristics 10/01/23 0400   Distal Tube Length (cm) 53 10/01/23 0000   Tolerance no signs/symptoms of discomfort 10/01/23 0400   Securement secured to nostril center w/ adhesive device 10/01/23 0400   Clamp Status/Tolerance unclamped;no emesis;no nausea;no restlessness 10/01/23 0400   Suction Setting/Drainage Method suction at;low;intermittent setting 10/01/23 0400   Insertion Site Appearance no redness, warmth, tenderness, skin breakdown, drainage 10/01/23 0400   Drainage Bile;Green 10/01/23 0400   Flush/Irrigation flushed w/;water;no resistance met 09/29/23 2000   Tube Output(mL)(Include Discarded Residual) 250 mL 10/01/23 0528   Number of days: 3       Male External Urinary Catheter 09/30/23 0500 (Active)   Collection Container Urimeter 10/01/23 0400   Securement Method secured to top of thigh w/ adhesive device 10/01/23 0400   Skin no redness;no breakdown;penis/scrotum cleansed w/ soap and water 10/01/23 0400   Tolerance no signs/symptoms of discomfort;assisted with appliance change 10/01/23 0400   Output (mL) 100 mL 10/01/23 0528   Catheter Change Date 09/30/23 10/01/23 0400   Catheter Change Time 0430 10/01/23 0400   Number of days: 1  "      Physical examination:  Gen: NAD, AAOx3, answering questions appropriately  HEENT: normocephalic, atraumatic, EOMI, PERRL  CV: RR  Resp: NWOB  Abd: S/distended, nontender  Msk: moving all extremities spontaneously and purposefully- reduced to RUE/RLE. Ortho dressings removed  Neuro: CN II-XII grossly intact GCS 15  Skin/wounds: warm dry, dressings to RUE RLE    Labs:  Renal:  No results for input(s): "BUN", "CREATININE" in the last 72 hours.    No results for input(s): "LACTIC" in the last 72 hours.  FEN/GI:  No results for input(s): "NA", "K", "CL", "CO2", "CALCIUM", "MG", "PHOS", "PROT", "ALBUMIN", "BILITOT", "AST", "ALKPHOS", "ALT" in the last 72 hours.    Heme:  No results for input(s): "HGB", "HCT", "PLT", "PTT", "INR" in the last 72 hours.    ID:  No results for input(s): "WBC" in the last 72 hours.    CBG:  No results for input(s): "GLUCOSE" in the last 72 hours.     No results for input(s): "POCTGLUCOSE" in the last 72 hours.   Cardiovascular:  No results for input(s): "TROPONINI", "CKTOTAL", "CKMB", "BNP" in the last 168 hours.  I have reviewed all pertinent lab results within the past 24 hours.    Imaging:  CT Abdomen Pelvis With Contrast   Final Result      1. Moderate dilatation of small-bowel loops with gradual transition to normal caliber small bowel distally.  Favor ileus over obstruction.   2. Fluid-filled colon which could be seen with a mild colitis or other diarrheal illness.   3. Enteric tube tip just past the GE junction.  Suggest advancing at least 5 cm if possible.   4. Other findings above.         Electronically signed by: Ritchie Coker   Date:    10/02/2023   Time:    13:04      X-Ray Abdomen AP 1 View   Final Result      As above.         Electronically signed by: Stevo Thorne   Date:    10/01/2023   Time:    09:36      X-Ray Hip 1 View Right (with Pelvis when performed)   Final Result      Expected postoperative changes.         Electronically signed by: Stevo Thorne "   Date:    09/29/2023   Time:    18:23      X-Ray Chest 1 View   Final Result      XR Gastric tube check, non-radiologist performed   Final Result      Nasogastric tube terminates within the gastric fundus.         Electronically signed by: Desmond Villa   Date:    09/27/2023   Time:    17:51      SURG FL Surgery Fluoro Usage   Final Result      X-Ray Abdomen AP 1 View   Final Result      Progressive gaseous distension of the bowel.  Consider ileus versus distal obstruction.         Electronically signed by: Kathie Atwood   Date:    09/27/2023   Time:    06:42      MRI Lumbar Spine Without Contrast   Final Result      1. L3 vertebral body superior half acute compression deformity with paraspinal soft inflammations.  Combination retropulsed bony fragment and ventral epidural hematoma results in central canal stenosis.      2. Lumbar degenerative disc disease and spondylosis level by level discussed above.         Electronically signed by: Desmond Villa   Date:    09/23/2023   Time:    15:40      MRI Thoracic Spine Without Contrast   Final Result      No evidence for acute osseous finding or static listhesis      No significant herniation or bulge.  No canal, cord, or foraminal compromise      Mild thoracic spondylosis.         Electronically signed by: Jose Antonio Mak MD   Date:    09/23/2023   Time:    15:33      CT Ankle (Including Hindfoot) Without Contrast Right   Final Result      Calcaneus fracture as described above.         Electronically signed by: Jose Antonio Mak MD   Date:    09/23/2023   Time:    14:21      X-Ray Hand 3 view Left   Final Result      No acute osseous abnormality identified.         Electronically signed by: Desmond Villa   Date:    09/23/2023   Time:    13:53      X-Ray Knee Complete 4 or More Views Left   Final Result      No acute osseous abnormality identified.         Electronically signed by: Desmond Villa   Date:    09/23/2023   Time:    13:51      Xray Previous   Final Result      Xray  Previous   Final Result      Xray Previous   Final Result      CT Previous   Final Result      CT Previous   Final Result      CT Previous   Final Result      CT Previous   Final Result      X-Ray Pelvis Routine AP   Final Result      Right subcapital femoral neck fracture.         Electronically signed by: Jose Antonio Mak MD   Date:    09/23/2023   Time:    12:31      X-Ray Femur Ap/Lat Right   Final Result      Right subcapital femoral neck fracture.         Electronically signed by: Jose Antonio Mak MD   Date:    09/23/2023   Time:    12:27      X-Ray Wrist Complete Right   Final Result      Mildly displaced dorsal triquetral fracture.      Nondisplaced distal radius fracture.         Electronically signed by: Jose Antonio Mak MD   Date:    09/23/2023   Time:    11:45      X-Ray Ankle Complete Right   Final Result      Fractured calcaneus.         Electronically signed by: Desmond Villa   Date:    09/23/2023   Time:    11:45         I have reviewed all pertinent imaging results/findings within the past 24 hours.    Micro/Path/Other:  Microbiology Results (last 7 days)       Procedure Component Value Units Date/Time    Blood Culture [[id number]]  (Normal) Collected: 10/03/23 0824    Order Status: Completed Specimen: Blood Updated: 10/08/23 1000     CULTURE, BLOOD ([facility]) No Growth at 5 days           Specimen (168h ago, onward)      None             Problems list:  Active Problem List with Overview Notes    Diagnosis Date Noted    Ileus 10/03/2023    Closed nondisplaced fracture of right calcaneus 09/24/2023    Closed fracture of right femur 09/24/2023    Epidural hematoma 09/24/2023    Spinal stenosis of lumbar region 09/24/2023    Closed fracture of third lumbar vertebra 09/23/2023    Radius fracture 09/23/2023    Closed right hip fracture 09/23/2023    Displaced fracture of body of right talus, initial encounter for closed fracture 09/23/2023        Assessment & Plan:   47 y.o. male admitted on 9/23/2023 following   fell or slept wall fall from a balcony of approximally 12th and 13th feet.  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      Consults:   Neurosurgery  Orthopedic surgery   Therapy:  Physical Therapy  Occupational Therapy Weight bearing status:   NWB RLE, RUE Precautions:  Fall and Spinal   Seizure prophylaxis:  Not indicated. VTE prophylaxis:     Prophylactic Lovenox 40mg BID GI prophylaxis:  H2B   Outpatient follow up:  Orthopedic surgery  Neurosurgery  Disposition:  Rehab vs HH w/ DME       -Tolerating regular diet  -Ambulating well with PT, recommending rehab vs. Home with HH:   -will need 3 week fu w/ Ortho for calcaneous/wrist fx   -Case management assisting with medicaid approval  - PO and IV pain meds  -  PO PPI daily  - Lovenox for DVT prophylaxis    Lalit Pike MD   [employer] General Surgery, PGY-1

## 2023-10-13 LAB
BASOPHILS # BLD AUTO: 0.04 X10(3)/MCL
BASOPHILS NFR BLD AUTO: 0.5 %
EOSINOPHIL # BLD AUTO: 0.11 X10(3)/MCL (ref 0–0.9)
EOSINOPHIL NFR BLD AUTO: 1.5 %
ERYTHROCYTE [DISTWIDTH] IN BLOOD BY AUTOMATED COUNT: 15.3 % (ref 11.5–17)
HCT VFR BLD AUTO: 26.5 % (ref 42–52)
HGB BLD-MCNC: 8.5 G/DL (ref 14–18)
IMM GRANULOCYTES # BLD AUTO: 0.25 X10(3)/MCL (ref 0–0.04)
IMM GRANULOCYTES NFR BLD AUTO: 3.4 %
LYMPHOCYTES # BLD AUTO: 1.56 X10(3)/MCL (ref 0.6–4.6)
LYMPHOCYTES NFR BLD AUTO: 21.2 %
MCH RBC QN AUTO: 29.6 PG (ref 27–31)
MCHC RBC AUTO-ENTMCNC: 32.1 G/DL (ref 33–36)
MCV RBC AUTO: 92.3 FL (ref 80–94)
MONOCYTES # BLD AUTO: 0.92 X10(3)/MCL (ref 0.1–1.3)
MONOCYTES NFR BLD AUTO: 12.5 %
NEUTROPHILS # BLD AUTO: 4.49 X10(3)/MCL (ref 2.1–9.2)
NEUTROPHILS NFR BLD AUTO: 60.9 %
NRBC BLD AUTO-RTO: 0 %
PLATELET # BLD AUTO: 691 X10(3)/MCL (ref 130–400)
PMV BLD AUTO: 8.8 FL (ref 7.4–10.4)
RBC # BLD AUTO: 2.87 X10(6)/MCL (ref 4.7–6.1)
WBC # SPEC AUTO: 7.37 X10(3)/MCL (ref 4.5–11.5)

## 2023-10-13 PROCEDURE — 25000003 PHARM REV CODE 250: Performed by: NURSE PRACTITIONER

## 2023-10-13 PROCEDURE — 97164 PT RE-EVAL EST PLAN CARE: CPT

## 2023-10-13 PROCEDURE — 11000001 HC ACUTE MED/SURG PRIVATE ROOM

## 2023-10-13 PROCEDURE — 63600175 PHARM REV CODE 636 W HCPCS: Performed by: NURSE PRACTITIONER

## 2023-10-13 PROCEDURE — 97116 GAIT TRAINING THERAPY: CPT

## 2023-10-13 PROCEDURE — 97535 SELF CARE MNGMENT TRAINING: CPT

## 2023-10-13 PROCEDURE — 21400001 HC TELEMETRY ROOM

## 2023-10-13 PROCEDURE — 85025 COMPLETE CBC W/AUTO DIFF WBC: CPT | Performed by: NURSE PRACTITIONER

## 2023-10-13 RX ADMIN — ENOXAPARIN SODIUM 40 MG: 40 INJECTION SUBCUTANEOUS at 08:10

## 2023-10-13 RX ADMIN — DOCUSATE SODIUM 100 MG: 50 LIQUID ORAL at 08:10

## 2023-10-13 RX ADMIN — POLYETHYLENE GLYCOL 3350 17 G: 17 POWDER, FOR SOLUTION ORAL at 08:10

## 2023-10-13 RX ADMIN — OXYCODONE HYDROCHLORIDE 10 MG: 10 TABLET ORAL at 11:10

## 2023-10-13 RX ADMIN — PANTOPRAZOLE SODIUM 40 MG: 40 TABLET, DELAYED RELEASE ORAL at 08:10

## 2023-10-13 RX ADMIN — Medication 1 TABLET: at 08:10

## 2023-10-13 RX ADMIN — OXYCODONE HYDROCHLORIDE 10 MG: 10 TABLET ORAL at 05:10

## 2023-10-13 RX ADMIN — GABAPENTIN 300 MG: 300 CAPSULE ORAL at 08:10

## 2023-10-13 RX ADMIN — OXYCODONE HYDROCHLORIDE 10 MG: 10 TABLET ORAL at 08:10

## 2023-10-13 RX ADMIN — FOLIC ACID 1 MG: 1 TABLET ORAL at 08:10

## 2023-10-13 RX ADMIN — OXYCODONE HYDROCHLORIDE 10 MG: 10 TABLET ORAL at 04:10

## 2023-10-13 RX ADMIN — GABAPENTIN 300 MG: 300 CAPSULE ORAL at 02:10

## 2023-10-13 RX ADMIN — Medication 6 MG: at 08:10

## 2023-10-13 RX ADMIN — THIAMINE HCL TAB 100 MG 100 MG: 100 TAB at 08:10

## 2023-10-13 RX ADMIN — THERA TABS 1 TABLET: TAB at 08:10

## 2023-10-13 NOTE — PT/OT/SLP RE-EVAL
Physical Therapy Re-Evaluation    Patient Name:  José Henry   MRN:  [id number]    Recommendations:     Discharge Recommendations: home with home health   Discharge Equipment Recommendations: platform, walker, rolling, bath bench, bedside commode   Barriers to discharge: Impaired mobility and Ongoing medical needs    Assessment:     José Henry is a 47 y.o. male admitted with a medical diagnosis of s/p fall from balcony resulting in  right hip fracture, burst fracture of L3 with retropulsion, central cord stenosis based on CT with 13 mm lytic lesion with sclerotic rim and thinned zone of transition in the left iliac bone which is a incidental finding, comminuted fracture of the calcaneus with minimally displaced fracture of the talus. Pt is s/p R JESSIE and L1-5 PLF  He presents with the following impairments/functional limitations: weakness, impaired endurance, impaired self care skills, impaired functional mobility, gait instability, impaired balance, decreased upper extremity function, decreased lower extremity function, pain, orthopedic precautions. Pt has continued to progress well with skilled interventions. He is overall min A for transfers from low seat height, and min A for ambulation with platform walker up and down a ramp. He states ramp has been installed in home into [facility] living room and arrangements being made for pt to sleep downstairs. Pt would benefit from continued training on ambulation up/down a ramp before discharging home.    Rehab Prognosis: Good; patient would benefit from acute skilled PT services to address these deficits and reach maximum level of function.    Recent Surgery: Procedure(s) (LRB):  ARTHROPLASTY, HIP (Right) 14 Days Post-Op    Plan:     During this hospitalization, patient to be seen 6 x/week to address the identified rehab impairments via gait training, therapeutic activities, therapeutic exercises, neuromuscular re-education and progress toward the following goals:    Plan  of Care Expires:  11/01/23    Subjective     Chief Complaint: pain  Patient/Family Comments/goals: none  Pain/Comfort:  Pain Rating 1: 6/10  Location - Orientation 1: generalized  Pain Addressed 1: Reposition, Cessation of Activity  Pain Rating Post-Intervention 1: 3/10    Patients cultural, spiritual, [employer] conflicts given the current situation: no    Objective:     Communicated with nurse prior to session.  Patient found HOB elevated with telemetry  upon PT entry to room.    General Precautions: Standard, fall  Orthopedic Precautions:spinal precautions, RUE non weight bearing, RLE non weight bearing, RLE posterior precautions   Braces: UE brace, TLSO, CAM boot  Respiratory Status: Room air    Exams:  Cognitive Exam:  Patient is oriented to Person, Place, Time, and Situation  Sensation: -       Intact  RLE ROM: WFL within hip precautions R hip, WFL at knee, ankle not tested  RLE Strength: hip and knee grossly 4/5, ankle NT  LLE ROM: WFL  LLE Strength: WFL  Skin integrity: Visible skin intact      Functional Mobility:  Bed Mobility:     Supine to Sit: modified independence  Transfers:  Sit to Stand:  minimum assistance with platform walker  Gait: 150ft + 100ft with PRW hop to gait pattern. PT instructed on decreasing pace and decreased advancement of AD between hops in order to maintain upright posture and achieve increased foot clearance with step. Introduced navigation of ramp with RW and hop to gait. Pt required min A with max verbal cues for pacing and step length  Balance: fair      AM-PAC 6 CLICK MOBILITY  Total Score:18     Education Provided:  Decreasing step length to facilitate improved posture and increased foot clearance during ambulation    Patient left up in chair with all lines intact and call button in reach.    GOALS:   Multidisciplinary Problems       Physical Therapy Goals          Problem: Physical Therapy    Goal Priority Disciplines Outcome Goal Variances Interventions   Physical Therapy  Goal     PT, PT/OT Ongoing, Progressing     Description: Goals to be met by: 2023     Patient will increase functional independence with mobility by performin. Supine to sit with Modified [place]  2. Sit to stand transfer with Modified [place]  3. Gait  x 150 feet with Modified [place] using platform RW.   4. Pt navigates ramp using platform RW modified independent.                         History:     Past Medical History:   Diagnosis Date    GERD (gastroesophageal reflux disease)     Smoker        Past Surgical History:   Procedure Laterality Date    HIP ARTHROPLASTY Right 2023    Procedure: ARTHROPLASTY, HIP;  Surgeon: Tai Vallejo MD;  Location: [facility];  Service: Orthopedics;  Laterality: Right;  REQ. 12:30PM    POSTERIOR LUMBAR INTERBODY FUSION (PLIF) WITH COMPUTER-ASSISTED NAVIGATION N/A 2023    Procedure: FUSION, SPINE, LUMBAR, PLIF, USING COMPUTER-ASSISTED NAVIGATION;  Surgeon: Kiera Diaz MD;  Location: [facility];  Service: Neurosurgery;  Laterality: N/A;  L1-L5 fixation // L2-L3 laminectomy // NTI // medtronic // O-arm       Time Tracking:     PT Received On: 10/13/23  PT Start Time: 1016     PT Stop Time: 1040  PT Total Time (min): 24 min     Billable Minutes: Re-eval 15min and Gait Training 9min      10/13/2023

## 2023-10-13 NOTE — PROGRESS NOTES
"   Trauma Surgery   Progress Note  Admit Date: 9/23/2023  HD#20  POD#14 Days Post-Op    Subjective:   Interval history:  NAEON  AFVSS  Patient is having Bms, and passing flatus  UOP appropriate  Tolerating regular diet without n/v  PT recommending rehab vs. Home with HH  Pain well controlled     Home Meds:   Current Outpatient Medications   Medication Instructions    dextroamphetamine-amphetamine 30 mg Tab 1 tablet, Oral, Daily    pantoprazole (PROTONIX) 40 MG tablet 1 tablet, Oral, Daily      Scheduled Meds:   calcium-vitamin D3  1 tablet Oral BID    docusate  100 mg Oral BID    enoxparin  40 mg Subcutaneous Q12H (prophylaxis, 0900/2100)    folic acid  1 mg Oral Daily    gabapentin  300 mg Oral TID    multivitamin  1 tablet Oral Daily    pantoprazole  40 mg Oral Daily    polyethylene glycol  17 g Oral BID    thiamine  100 mg Oral Daily     Continuous Infusions:      PRN Meds:bisacodyL, heparin, porcine (PF), melatonin, metoclopramide HCl, ondansetron, oxyCODONE, promethazine     Objective:     VITAL SIGNS: 24 HR MIN & MAX LAST   Temp  Min: 98.1 °F (36.7 °C)  Max: 99.1 °F (37.3 °C)  98.3 °F (36.8 °C)   BP  Min: 98/63  Max: 104/66  100/64    Pulse  Min: 79  Max: 93  79    Resp  Min: 16  Max: 20  16    SpO2  Min: 95 %  Max: 100 %  95 %      HT: 6' 2.02" (188 cm)  WT: 93.9 kg (207 lb)  BMI: 26.6     Intake/output:  Intake/Output - Last 3 Shifts         10/11 0700  10/12 0659 10/12 0700  10/13 0659 10/13 0700  10/14 0659    P.O. 480      Total Intake(mL/kg) 480 (5.1)      Urine (mL/kg/hr) 700 (0.3) 500 (0.2)     Total Output 700 500     Net -220 -500                    Intake/Output Summary (Last 24 hours) at 10/13/2023 0712  Last data filed at 10/12/2023 1200  Gross per 24 hour   Intake --   Output 500 ml   Net -500 ml           Lines/drains/airway:       Peripheral IV - Single Lumen 09/26/23 2200 Anterior;Left Forearm (Active)   Site Assessment Clean;Dry;Intact;No redness;No swelling;No warmth;No drainage 10/01/23 " 0400   Extremity Assessment Distal to IV No abnormal discoloration 10/01/23 0400   Line Status Infusing 10/01/23 0400   Dressing Status Clean;Dry;Intact 10/01/23 0400   Dressing Intervention Integrity maintained 10/01/23 0400   Number of days: 4            Peripheral IV - Single Lumen 09/27/23 0728 18 G Left Hand (Active)   Site Assessment Clean;Dry;Intact;No redness;No swelling;No warmth;No drainage 10/01/23 0400   Extremity Assessment Distal to IV No abnormal discoloration 10/01/23 0400   Line Status Saline locked 10/01/23 0400   Dressing Status Clean;Dry;Intact 10/01/23 0400   Dressing Intervention Integrity maintained 10/01/23 0400   Number of days: 4            NG/OG Tube 09/27/23 1730 nasogastric Left nostril (Active)   Placement Check placement verified by aspirate characteristics 10/01/23 0400   Distal Tube Length (cm) 53 10/01/23 0000   Tolerance no signs/symptoms of discomfort 10/01/23 0400   Securement secured to nostril center w/ adhesive device 10/01/23 0400   Clamp Status/Tolerance unclamped;no emesis;no nausea;no restlessness 10/01/23 0400   Suction Setting/Drainage Method suction at;low;intermittent setting 10/01/23 0400   Insertion Site Appearance no redness, warmth, tenderness, skin breakdown, drainage 10/01/23 0400   Drainage Bile;Green 10/01/23 0400   Flush/Irrigation flushed w/;water;no resistance met 09/29/23 2000   Tube Output(mL)(Include Discarded Residual) 250 mL 10/01/23 0528   Number of days: 3       Male External Urinary Catheter 09/30/23 0500 (Active)   Collection Container Urimeter 10/01/23 0400   Securement Method secured to top of thigh w/ adhesive device 10/01/23 0400   Skin no redness;no breakdown;penis/scrotum cleansed w/ soap and water 10/01/23 0400   Tolerance no signs/symptoms of discomfort;assisted with appliance change 10/01/23 0400   Output (mL) 100 mL 10/01/23 0528   Catheter Change Date 09/30/23 10/01/23 0400   Catheter Change Time 0430 10/01/23 0400   Number of days: 1  "      Physical examination:  Gen: NAD, AAOx3, answering questions appropriately  HEENT: normocephalic, atraumatic, EOMI, PERRL  CV: RR  Resp: NWOB  Abd: S/distended, nontender  Msk: moving all extremities spontaneously and purposefully- reduced to RUE/RLE. Ortho dressings removed  Neuro: CN II-XII grossly intact GCS 15  Skin/wounds: warm dry, dressings to RUE RLE    Labs:  Renal:  Recent Labs     10/12/23  0547   BUN 16.0   CREATININE 0.85       No results for input(s): "LACTIC" in the last 72 hours.  FEN/GI:  Recent Labs     10/12/23  0547      K 4.2   CO2 28   CALCIUM 8.4   MG 2.10   PHOS 4.1   ALBUMIN 2.9*   BILITOT 0.5   AST 18   ALKPHOS 111   ALT 25       Heme:  Recent Labs     10/12/23  0547   HGB 7.6*   HCT 24.1*   *       ID:  Recent Labs     10/12/23  0547   WBC 7.64       CBG:  Recent Labs     10/12/23  0547   GLUCOSE 106*        No results for input(s): "POCTGLUCOSE" in the last 72 hours.   Cardiovascular:  No results for input(s): "TROPONINI", "CKTOTAL", "CKMB", "BNP" in the last 168 hours.  I have reviewed all pertinent lab results within the past 24 hours.    Imaging:  CT Abdomen Pelvis With Contrast   Final Result      1. Moderate dilatation of small-bowel loops with gradual transition to normal caliber small bowel distally.  Favor ileus over obstruction.   2. Fluid-filled colon which could be seen with a mild colitis or other diarrheal illness.   3. Enteric tube tip just past the GE junction.  Suggest advancing at least 5 cm if possible.   4. Other findings above.         Electronically signed by: Ritchie Coker   Date:    10/02/2023   Time:    13:04      X-Ray Abdomen AP 1 View   Final Result      As above.         Electronically signed by: Stevo Thorne   Date:    10/01/2023   Time:    09:36      X-Ray Hip 1 View Right (with Pelvis when performed)   Final Result      Expected postoperative changes.         Electronically signed by: Stevo Thorne   Date:    09/29/2023   Time:    18:23 "      X-Ray Chest 1 View   Final Result      XR Gastric tube check, non-radiologist performed   Final Result      Nasogastric tube terminates within the gastric fundus.         Electronically signed by: Desmond Villa   Date:    09/27/2023   Time:    17:51      SURG FL Surgery Fluoro Usage   Final Result      X-Ray Abdomen AP 1 View   Final Result      Progressive gaseous distension of the bowel.  Consider ileus versus distal obstruction.         Electronically signed by: Kathie Atwood   Date:    09/27/2023   Time:    06:42      MRI Lumbar Spine Without Contrast   Final Result      1. L3 vertebral body superior half acute compression deformity with paraspinal soft inflammations.  Combination retropulsed bony fragment and ventral epidural hematoma results in central canal stenosis.      2. Lumbar degenerative disc disease and spondylosis level by level discussed above.         Electronically signed by: Desmond Villa   Date:    09/23/2023   Time:    15:40      MRI Thoracic Spine Without Contrast   Final Result      No evidence for acute osseous finding or static listhesis      No significant herniation or bulge.  No canal, cord, or foraminal compromise      Mild thoracic spondylosis.         Electronically signed by: Jose Antonio Mak MD   Date:    09/23/2023   Time:    15:33      CT Ankle (Including Hindfoot) Without Contrast Right   Final Result      Calcaneus fracture as described above.         Electronically signed by: Jose Antonio Mak MD   Date:    09/23/2023   Time:    14:21      X-Ray Hand 3 view Left   Final Result      No acute osseous abnormality identified.         Electronically signed by: Desmond Villa   Date:    09/23/2023   Time:    13:53      X-Ray Knee Complete 4 or More Views Left   Final Result      No acute osseous abnormality identified.         Electronically signed by: Desmond Villa   Date:    09/23/2023   Time:    13:51      Xray Previous   Final Result      Xray Previous   Final Result      Xray  Previous   Final Result      CT Previous   Final Result      CT Previous   Final Result      CT Previous   Final Result      CT Previous   Final Result      X-Ray Pelvis Routine AP   Final Result      Right subcapital femoral neck fracture.         Electronically signed by: Jose Antonio Mak MD   Date:    09/23/2023   Time:    12:31      X-Ray Femur Ap/Lat Right   Final Result      Right subcapital femoral neck fracture.         Electronically signed by: Jose Antonio Mak MD   Date:    09/23/2023   Time:    12:27      X-Ray Wrist Complete Right   Final Result      Mildly displaced dorsal triquetral fracture.      Nondisplaced distal radius fracture.         Electronically signed by: Jose Antonio Mak MD   Date:    09/23/2023   Time:    11:45      X-Ray Ankle Complete Right   Final Result      Fractured calcaneus.         Electronically signed by: Desmond Villa   Date:    09/23/2023   Time:    11:45         I have reviewed all pertinent imaging results/findings within the past 24 hours.    Micro/Path/Other:  Microbiology Results (last 7 days)       Procedure Component Value Units Date/Time    Blood Culture [[id number]]  (Normal) Collected: 10/03/23 0824    Order Status: Completed Specimen: Blood Updated: 10/08/23 1000     CULTURE, BLOOD ([facility]) No Growth at 5 days           Specimen (168h ago, onward)      None             Problems list:  Active Problem List with Overview Notes    Diagnosis Date Noted    Ileus 10/03/2023    Closed nondisplaced fracture of right calcaneus 09/24/2023    Closed fracture of right femur 09/24/2023    Epidural hematoma 09/24/2023    Spinal stenosis of lumbar region 09/24/2023    Closed fracture of third lumbar vertebra 09/23/2023    Radius fracture 09/23/2023    Closed right hip fracture 09/23/2023    Displaced fracture of body of right talus, initial encounter for closed fracture 09/23/2023        Assessment & Plan:   47 y.o. male admitted on 9/23/2023 following  fell or slept wall fall from a  balcony of approximally 12th and 13th feet.  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      Consults:   Neurosurgery  Orthopedic surgery   Therapy:  Physical Therapy  Occupational Therapy Weight bearing status:   NWB RLE, RUE Precautions:  Fall and Spinal   Seizure prophylaxis:  Not indicated. VTE prophylaxis:     Prophylactic Lovenox 40mg BID GI prophylaxis:  H2B   Outpatient follow up:  Orthopedic surgery  Neurosurgery  Disposition:  Rehab vs HH w/ DME       -Tolerating regular diet  -Ambulating well with PT, recommending rehab vs. Home with HH:   -will need 3 week fu w/ Ortho for calcaneous/wrist fx   -Case management assisting with medicaid approval  - PO and IV pain meds  -  PO PPI daily  - Lovenox for DVT prophylaxis    Lalit Pike MD   [employer] General Surgery, PGY-1

## 2023-10-13 NOTE — PT/OT/SLP PROGRESS
Occupational Therapy   Treatment    Name: José Henry  MRN: [id number]  Admitting Diagnosis:  Fall from balcony: R femoral neck fx s/p JESSIE, R calcaneal fx(non-op), R talus fx, R distal radius fx/R ulnar styloid fx (non-op), L3 kassandra fx s/p L1-L5 posterolateral fusion c L2-3 laminectomy   14 Days Post-Op    Recommendations:     Discharge Recommendations: home with home health  Discharge Equipment Recommendations:  platform, walker, rolling, bedside commode, bath bench  Barriers to discharge:       Assessment:     José Henry is a 47 y.o. male with a medical diagnosis of Fall from balcony: R femoral neck fx s/p JESSIE, R calcaneal fx(non-op), R talus fx, R distal radius fx/R ulnar styloid fx (non-op), L3 kassandra fx s/p L1-L5 posterolateral fusion c L2-3 laminectomy. Performance deficits affecting function are weakness, gait instability, impaired endurance, impaired balance, decreased lower extremity function, decreased upper extremity function, impaired self care skills, impaired functional mobility, orthopedic precautions. Pt would benefit from home health services vs rehab due to increased independence with ADL tasks.     Rehab Prognosis:  Good; patient would benefit from acute skilled OT services to address these deficits and reach maximum level of function.       Plan:     Patient to be seen 5 x/week to address the above listed problems via self-care/home management, therapeutic activities, therapeutic exercises  Plan of Care Expires: 10/29/23  Plan of Care Reviewed with: patient    Subjective     Pain/Comfort:  Pain Rating 1: 0/10    Objective:     Communicated with: case management prior to session. Patient found HOB elevated with   upon OT entry to room.    General Precautions: Standard, fall    Orthopedic Precautions:spinal precautions, RLE posterior precautions, RUE non weight bearing, RLE non weight bearing  Braces:  (RLE CAM boot)  Respiratory Status: Room air     Occupational Performance:     Bed Mobility:     Patient completed Supine to Sit with stand by assistance  Patient completed Sit to Supine with stand by assistance     Functional Mobility/Transfers:  Patient completed Sit <> Stand Transfer with stand by assistance  with  platform walker   Patient completed Toilet Transfer Step Transfer technique with contact guard assistance with platform walker and grab bars  Functional Mobility: Pt ambulated ~100 ft with SBA using platform walker    Activities of Daily Living:  Upper Body Dressing: independently donned TLSO while sitting EOB  Toileting: contact guard assistance to transition from standing to sitting on standard toilet using grab bars and platform walker      Therapeutic Positioning    OT interventions performed during the course of today's session in an effort to prevent and/or reduce acquired pressure injuries:   Therapeutic positioning was provided at the conclusion of session to offload all bony prominences for the prevention and/or reduction of pressure injuries    Skin assessment: all bony prominences were assessed    Findings: no redness or breakdown noted    [facility] 6 Click ADL: 22    Patient Education:  Patient provided with verbal education education regarding OT role/goals/POC, post op precautions, safety awareness, and Discharge/DME recommendations.  Understanding was verbalized.      Patient left HOB elevated with call button in reach    GOALS:   Multidisciplinary Problems       Occupational Therapy Goals          Problem: Occupational Therapy    Goal Priority Disciplines Outcome Interventions   Occupational Therapy Goal     OT, PT/OT Ongoing, Progressing    Description: Goals to be met by 10/29/23:    Pt will complete grooming standing at sink with LRAD with SBA.  Pt will complete UB dressing with SBA.  Pt will complete LB dressing with SBA using AE  Pt will complete toileting with SBA using LRAD.  Pt will complete functional mobility to/from toilet and toilet transfer with SBA using LRAD.                           Time Tracking:     OT Date of Treatment: 10/13/23  OT Start Time: 1335  OT Stop Time: 1348  OT Total Time (min): 13 min    Billable Minutes:Self Care/Home Management 13 minutes     OT/JOSE: OT     Number of JOSE visits since last OT visit: 5    10/13/2023

## 2023-10-13 NOTE — PROGRESS NOTES
Inpatient Nutrition Assessment    Admit Date: 9/23/2023   Total duration of encounter: 20 days     Nutrition Recommendation/Prescription     Continue regular diet  Continue vitamin supplementation   Boost Plus TID provide an additional 360 kcal and 14 g protein per container      Communication of Recommendations: reviewed with nurse    Nutrition Assessment     Malnutrition Assessment/Nutrition-Focused Physical Exam    Malnutrition Context: acute illness or injury (09/29/23 1508)  Malnutrition Level:  (does not meet criteria) (09/29/23 1508)  Energy Intake (Malnutrition):  (does not meet criteria) (10/13/23 1018)  Weight Loss (Malnutrition):  (does not meet criteria) (09/29/23 1508)  Subcutaneous Fat (Malnutrition):  (does not meet criteria) (09/29/23 1508)           Muscle Mass (Malnutrition):  (does not meet criteria) (09/29/23 1508)                                   A minimum of two characteristics is recommended for diagnosis of either severe or non-severe malnutrition.    Chart Review    Reason Seen: length of stay    Malnutrition Screening Tool Results   Have you recently lost weight without trying?: No  Have you been eating poorly because of a decreased appetite?: No   MST Score: 0   Diagnosis:  POD#2 s/p L1 to L5 posterolateral fusion with L2-3 laminectomy for a L3 burst fracture  Ileus vs distal obstruction  Slept walk off balcony resulting in R hip fx, L3 burst fx, central cord stenosis, calcaneus fx, displaced fx of talus    Relevant Medical History: ETOH use, smoker, GERD    Nutrition-Related Medications: mv, calcium-vitamin D3, Pepcid, folic acid, MCI, thiamine, miralax  Calorie Containing IV Medications: no significant kcals from medications at this time    Nutrition-Related Labs: 9/29: RBC-3.59, H/H-10.8/31.7, glu-103, sherif-8.0  10/3: K 2.9, Crea 0.63, Ca 8.1  10/5: Na 132, K 3.2, BUN 8.1, Crea0.61, Glu 126, Ca 7.9, PAB 11.6, CRP 66.7  10/9: Na 135, Glu 108, Phos 5, PAB 17, CRP 42.5  10/12: Glu 106, PAB  "18.1, CRP 28.5      Diet/PN Order: Diet Adult Regular  Oral Supplement Order: Boost Plus  Tube Feeding Order: none  Appetite/Oral Intake: good/% of meals  Factors Affecting Nutritional Intake: none identified  Food/Restorationist/Cultural Preferences: none reported  Food Allergies: none reported    Wound(s):       Last Bowel Movement: 10/11/23    Comments    : pt NPO x 2 days, NGT to suction for ileus. Pt to OR today for hip ORIF. Pt reports poor appetite since admission, with good appetite prior. No Gi issues reported.  lb with no unintentional weight loss prior admission.    10/3: pt started on clear liquid diet this morning, NG tube clamped, will add Boost Breeze; has been NPO or clear liquid diet x 5-6 days    10/5: tolerating regular diet, reports good intake, drinking oral supplement and requesting to switch to chocolate flavor    10/9: tolerating oral diet, no GI concerns    10/13: eating >75% of meals, no GI concerns    Anthropometrics    Height: 6' 2.02" (188 cm) Height Method: Stated  Last Weight: 93.9 kg (207 lb) (23 1056) Weight Method: Bed Scale  BMI (Calculated): 26.6  BMI Classification: overweight (BMI 25-29.9)        Ideal Body Weight (IBW), Male: 190.12 lb     % Ideal Body Weight, Male (lb): 108.95 %                 Usual Body Weight (UBW), k.9 kg  % Usual Body Weight: 100.2     Usual Weight Provided By: patient and patient denies unintentional weight loss    Wt Readings from Last 5 Encounters:   23 93.9 kg (207 lb)   20 104.1 kg (229 lb 8 oz)     Weight Change(s) Since Admission:  Admit Weight: 93.9 kg (207 lb) (23 1056)      Estimated Needs    Weight Used For Calorie Calculations: 93.9 kg (207 lb 0.2 oz)  Energy Calorie Requirements (kcal): 7500-1322 kcal (1.1-1.2 stress factor  Energy Need Method: Jackson-St Jeor  Weight Used For Protein Calculations: 93.9 kg (207 lb 0.2 oz)  Protein Requirements:  g (1.0-1.2 g/kg)  Fluid Requirements (mL): " 3390-0159 mL (1 mL/kcal)  Temp (24hrs), Av.6 °F (37 °C), Min:98.1 °F (36.7 °C), Max:99.1 °F (37.3 °C)       Enteral Nutrition    Patient not receiving enteral nutrition at this time.    Parenteral Nutrition    Patient not receiving parenteral nutrition support at this time.    Evaluation of Received Nutrient Intake    Calories: meeting estimated needs  Protein: meeting estimated needs    Patient Education    Not applicable.    Nutrition Diagnosis     PES: Inadequate energy intake related to inability to consume sufficient nutrients as evidenced by <50% est energy needs met x 5-6 days. (resolved)    Interventions/Goals     Intervention(s): general/healthful diet, commercial beverage, multivitamin/mineral supplement therapy, and collaboration with other providers  Goal: Meet greater than 75% of nutritional needs by follow-up. (goal met)    Monitoring & Evaluation     Dietitian will monitor energy intake, weight, electrolyte/renal panel, and glucose/endocrine profile.  Nutrition Risk/Follow-Up: low (follow-up in 5-7 days)   Please consult if re-assessment needed sooner.

## 2023-10-14 PROCEDURE — 63600175 PHARM REV CODE 636 W HCPCS: Performed by: NURSE PRACTITIONER

## 2023-10-14 PROCEDURE — 25000003 PHARM REV CODE 250: Performed by: NURSE PRACTITIONER

## 2023-10-14 PROCEDURE — 11000001 HC ACUTE MED/SURG PRIVATE ROOM

## 2023-10-14 PROCEDURE — 21400001 HC TELEMETRY ROOM

## 2023-10-14 PROCEDURE — 94761 N-INVAS EAR/PLS OXIMETRY MLT: CPT

## 2023-10-14 RX ADMIN — GABAPENTIN 300 MG: 300 CAPSULE ORAL at 03:10

## 2023-10-14 RX ADMIN — ENOXAPARIN SODIUM 40 MG: 40 INJECTION SUBCUTANEOUS at 08:10

## 2023-10-14 RX ADMIN — Medication 6 MG: at 09:10

## 2023-10-14 RX ADMIN — OXYCODONE HYDROCHLORIDE 10 MG: 10 TABLET ORAL at 01:10

## 2023-10-14 RX ADMIN — PANTOPRAZOLE SODIUM 40 MG: 40 TABLET, DELAYED RELEASE ORAL at 08:10

## 2023-10-14 RX ADMIN — Medication 1 TABLET: at 09:10

## 2023-10-14 RX ADMIN — GABAPENTIN 300 MG: 300 CAPSULE ORAL at 08:10

## 2023-10-14 RX ADMIN — GABAPENTIN 300 MG: 300 CAPSULE ORAL at 09:10

## 2023-10-14 RX ADMIN — ENOXAPARIN SODIUM 40 MG: 40 INJECTION SUBCUTANEOUS at 09:10

## 2023-10-14 RX ADMIN — FOLIC ACID 1 MG: 1 TABLET ORAL at 08:10

## 2023-10-14 RX ADMIN — OXYCODONE HYDROCHLORIDE 10 MG: 10 TABLET ORAL at 05:10

## 2023-10-14 RX ADMIN — OXYCODONE HYDROCHLORIDE 10 MG: 10 TABLET ORAL at 09:10

## 2023-10-14 RX ADMIN — THIAMINE HCL TAB 100 MG 100 MG: 100 TAB at 08:10

## 2023-10-14 RX ADMIN — Medication 1 TABLET: at 08:10

## 2023-10-14 RX ADMIN — OXYCODONE HYDROCHLORIDE 10 MG: 10 TABLET ORAL at 08:10

## 2023-10-14 RX ADMIN — POLYETHYLENE GLYCOL 3350 17 G: 17 POWDER, FOR SOLUTION ORAL at 08:10

## 2023-10-14 RX ADMIN — DOCUSATE SODIUM 100 MG: 50 LIQUID ORAL at 08:10

## 2023-10-14 RX ADMIN — OXYCODONE HYDROCHLORIDE 10 MG: 10 TABLET ORAL at 12:10

## 2023-10-14 RX ADMIN — THERA TABS 1 TABLET: TAB at 08:10

## 2023-10-14 NOTE — PROGRESS NOTES
"   Trauma Surgery   Progress Note  Admit Date: 9/23/2023  HD#21  POD#15 Days Post-Op    Subjective:   Interval history:  NAEON  AFVSS  Patient is having Bms, and passing flatus  UOP appropriate  Tolerating regular diet without n/v  PT recommending  Home with HH  Pain well controlled     Home Meds:   Current Outpatient Medications   Medication Instructions    dextroamphetamine-amphetamine 30 mg Tab 1 tablet, Oral, Daily    pantoprazole (PROTONIX) 40 MG tablet 1 tablet, Oral, Daily      Scheduled Meds:   calcium-vitamin D3  1 tablet Oral BID    docusate  100 mg Oral BID    enoxparin  40 mg Subcutaneous Q12H (prophylaxis, 0900/2100)    folic acid  1 mg Oral Daily    gabapentin  300 mg Oral TID    multivitamin  1 tablet Oral Daily    pantoprazole  40 mg Oral Daily    polyethylene glycol  17 g Oral BID    thiamine  100 mg Oral Daily     Continuous Infusions:      PRN Meds:bisacodyL, heparin, porcine (PF), melatonin, metoclopramide HCl, ondansetron, oxyCODONE, promethazine     Objective:     VITAL SIGNS: 24 HR MIN & MAX LAST   Temp  Min: 98 °F (36.7 °C)  Max: 99 °F (37.2 °C)  98 °F (36.7 °C)   BP  Min: 97/59  Max: 108/69  104/70    Pulse  Min: 76  Max: 90  76    Resp  Min: 17  Max: 19  18    SpO2  Min: 95 %  Max: 97 %  95 %      HT: 6' 2.02" (188 cm)  WT: 93.9 kg (207 lb)  BMI: 26.6     Intake/output:  Intake/Output - Last 3 Shifts         10/12 0700  10/13 0659 10/13 0700  10/14 0659 10/14 0700  10/15 0659    P.O.       Total Intake(mL/kg)       Urine (mL/kg/hr) 500 (0.2) 1700 (0.8)     Total Output 500 1700     Net -500 -1700                    Intake/Output Summary (Last 24 hours) at 10/14/2023 0725  Last data filed at 10/14/2023 0500  Gross per 24 hour   Intake --   Output 1700 ml   Net -1700 ml           Lines/drains/airway:       Peripheral IV - Single Lumen 09/26/23 2200 Anterior;Left Forearm (Active)   Site Assessment Clean;Dry;Intact;No redness;No swelling;No warmth;No drainage 10/01/23 0400   Extremity " Assessment Distal to IV No abnormal discoloration 10/01/23 0400   Line Status Infusing 10/01/23 0400   Dressing Status Clean;Dry;Intact 10/01/23 0400   Dressing Intervention Integrity maintained 10/01/23 0400   Number of days: 4            Peripheral IV - Single Lumen 09/27/23 0728 18 G Left Hand (Active)   Site Assessment Clean;Dry;Intact;No redness;No swelling;No warmth;No drainage 10/01/23 0400   Extremity Assessment Distal to IV No abnormal discoloration 10/01/23 0400   Line Status Saline locked 10/01/23 0400   Dressing Status Clean;Dry;Intact 10/01/23 0400   Dressing Intervention Integrity maintained 10/01/23 0400   Number of days: 4            NG/OG Tube 09/27/23 1730 nasogastric Left nostril (Active)   Placement Check placement verified by aspirate characteristics 10/01/23 0400   Distal Tube Length (cm) 53 10/01/23 0000   Tolerance no signs/symptoms of discomfort 10/01/23 0400   Securement secured to nostril center w/ adhesive device 10/01/23 0400   Clamp Status/Tolerance unclamped;no emesis;no nausea;no restlessness 10/01/23 0400   Suction Setting/Drainage Method suction at;low;intermittent setting 10/01/23 0400   Insertion Site Appearance no redness, warmth, tenderness, skin breakdown, drainage 10/01/23 0400   Drainage Bile;Green 10/01/23 0400   Flush/Irrigation flushed w/;water;no resistance met 09/29/23 2000   Tube Output(mL)(Include Discarded Residual) 250 mL 10/01/23 0528   Number of days: 3       Male External Urinary Catheter 09/30/23 0500 (Active)   Collection Container Urimeter 10/01/23 0400   Securement Method secured to top of thigh w/ adhesive device 10/01/23 0400   Skin no redness;no breakdown;penis/scrotum cleansed w/ soap and water 10/01/23 0400   Tolerance no signs/symptoms of discomfort;assisted with appliance change 10/01/23 0400   Output (mL) 100 mL 10/01/23 0528   Catheter Change Date 09/30/23 10/01/23 0400   Catheter Change Time 0430 10/01/23 0400   Number of days: 1       Physical  "examination:  Gen: NAD, AAOx3, answering questions appropriately  HEENT: normocephalic, atraumatic, EOMI, PERRL  CV: RR  Resp: NWOB  Abd: S/distended, nontender  Msk: moving all extremities spontaneously and purposefully- reduced to RUE/RLE. Ortho dressings removed  Neuro: CN II-XII grossly intact GCS 15  Skin/wounds: warm dry, dressings to RUE RLE    Labs:  Renal:  Recent Labs     10/12/23  0547   BUN 16.0   CREATININE 0.85         No results for input(s): "LACTIC" in the last 72 hours.  FEN/GI:  Recent Labs     10/12/23  0547      K 4.2   CO2 28   CALCIUM 8.4   MG 2.10   PHOS 4.1   ALBUMIN 2.9*   BILITOT 0.5   AST 18   ALKPHOS 111   ALT 25         Heme:  Recent Labs     10/12/23  0547 10/13/23  1111   HGB 7.6* 8.5*   HCT 24.1* 26.5*   * 691*         ID:  Recent Labs     10/12/23  0547 10/13/23  1111   WBC 7.64 7.37         CBG:  Recent Labs     10/12/23  0547   GLUCOSE 106*          No results for input(s): "POCTGLUCOSE" in the last 72 hours.   Cardiovascular:  No results for input(s): "TROPONINI", "CKTOTAL", "CKMB", "BNP" in the last 168 hours.  I have reviewed all pertinent lab results within the past 24 hours.    Imaging:  CT Abdomen Pelvis With Contrast   Final Result      1. Moderate dilatation of small-bowel loops with gradual transition to normal caliber small bowel distally.  Favor ileus over obstruction.   2. Fluid-filled colon which could be seen with a mild colitis or other diarrheal illness.   3. Enteric tube tip just past the GE junction.  Suggest advancing at least 5 cm if possible.   4. Other findings above.         Electronically signed by: Ritchie Coker   Date:    10/02/2023   Time:    13:04      X-Ray Abdomen AP 1 View   Final Result      As above.         Electronically signed by: Stevo Thorne   Date:    10/01/2023   Time:    09:36      X-Ray Hip 1 View Right (with Pelvis when performed)   Final Result      Expected postoperative changes.         Electronically signed by: Stevo" Fadumo   Date:    09/29/2023   Time:    18:23      X-Ray Chest 1 View   Final Result      XR Gastric tube check, non-radiologist performed   Final Result      Nasogastric tube terminates within the gastric fundus.         Electronically signed by: Desmond Villa   Date:    09/27/2023   Time:    17:51      SURG FL Surgery Fluoro Usage   Final Result      X-Ray Abdomen AP 1 View   Final Result      Progressive gaseous distension of the bowel.  Consider ileus versus distal obstruction.         Electronically signed by: Kathie Atwood   Date:    09/27/2023   Time:    06:42      MRI Lumbar Spine Without Contrast   Final Result      1. L3 vertebral body superior half acute compression deformity with paraspinal soft inflammations.  Combination retropulsed bony fragment and ventral epidural hematoma results in central canal stenosis.      2. Lumbar degenerative disc disease and spondylosis level by level discussed above.         Electronically signed by: Desmond Villa   Date:    09/23/2023   Time:    15:40      MRI Thoracic Spine Without Contrast   Final Result      No evidence for acute osseous finding or static listhesis      No significant herniation or bulge.  No canal, cord, or foraminal compromise      Mild thoracic spondylosis.         Electronically signed by: Jose Antonio Mak MD   Date:    09/23/2023   Time:    15:33      CT Ankle (Including Hindfoot) Without Contrast Right   Final Result      Calcaneus fracture as described above.         Electronically signed by: Jose Antonio Mak MD   Date:    09/23/2023   Time:    14:21      X-Ray Hand 3 view Left   Final Result      No acute osseous abnormality identified.         Electronically signed by: Desmond Villa   Date:    09/23/2023   Time:    13:53      X-Ray Knee Complete 4 or More Views Left   Final Result      No acute osseous abnormality identified.         Electronically signed by: Desmond Villa   Date:    09/23/2023   Time:    13:51      Xray Previous   Final Result       Xray Previous   Final Result      Xray Previous   Final Result      CT Previous   Final Result      CT Previous   Final Result      CT Previous   Final Result      CT Previous   Final Result      X-Ray Pelvis Routine AP   Final Result      Right subcapital femoral neck fracture.         Electronically signed by: Jose Antonio Mak MD   Date:    09/23/2023   Time:    12:31      X-Ray Femur Ap/Lat Right   Final Result      Right subcapital femoral neck fracture.         Electronically signed by: Jose Antonio Mak MD   Date:    09/23/2023   Time:    12:27      X-Ray Wrist Complete Right   Final Result      Mildly displaced dorsal triquetral fracture.      Nondisplaced distal radius fracture.         Electronically signed by: Jose Antonio Mak MD   Date:    09/23/2023   Time:    11:45      X-Ray Ankle Complete Right   Final Result      Fractured calcaneus.         Electronically signed by: Desmond Villa   Date:    09/23/2023   Time:    11:45         I have reviewed all pertinent imaging results/findings within the past 24 hours.    Micro/Path/Other:  Microbiology Results (last 7 days)       Procedure Component Value Units Date/Time    Blood Culture [[id number]]  (Normal) Collected: 10/03/23 0824    Order Status: Completed Specimen: Blood Updated: 10/08/23 1000     CULTURE, BLOOD ([facility]) No Growth at 5 days           Specimen (168h ago, onward)      None             Problems list:  Active Problem List with Overview Notes    Diagnosis Date Noted    Ileus 10/03/2023    Closed nondisplaced fracture of right calcaneus 09/24/2023    Closed fracture of right femur 09/24/2023    Epidural hematoma 09/24/2023    Spinal stenosis of lumbar region 09/24/2023    Closed fracture of third lumbar vertebra 09/23/2023    Radius fracture 09/23/2023    Closed right hip fracture 09/23/2023    Displaced fracture of body of right talus, initial encounter for closed fracture 09/23/2023        Assessment & Plan:   47 y.o. male admitted on 9/23/2023  following  fell or slept wall fall from a balcony of approximally 12th and 13th feet.  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      Consults:   Neurosurgery  Orthopedic surgery   Therapy:  Physical Therapy  Occupational Therapy Weight bearing status:   NWB RLE, RUE Precautions:  Fall and Spinal   Seizure prophylaxis:  Not indicated. VTE prophylaxis:     Prophylactic Lovenox 40mg BID GI prophylaxis:  H2B   Outpatient follow up:  Orthopedic surgery  Neurosurgery  Disposition:  Rehab vs HH w/ DME       -Tolerating regular diet  -Ambulating well with PT recommending  Home with HH   -will need 3 week fu w/ Ortho for calcaneous/wrist fx   -Case management assisting with medicaid approval  - PO and IV pain meds  -  PO PPI daily  - Lovenox for DVT prophylaxis    Lalit Pike MD   [employer] General Surgery, PGY-1

## 2023-10-15 PROCEDURE — 25000003 PHARM REV CODE 250: Performed by: NURSE PRACTITIONER

## 2023-10-15 PROCEDURE — 11000001 HC ACUTE MED/SURG PRIVATE ROOM

## 2023-10-15 PROCEDURE — 63600175 PHARM REV CODE 636 W HCPCS: Performed by: NURSE PRACTITIONER

## 2023-10-15 PROCEDURE — 97116 GAIT TRAINING THERAPY: CPT | Mod: CQ

## 2023-10-15 PROCEDURE — 97530 THERAPEUTIC ACTIVITIES: CPT | Mod: CQ

## 2023-10-15 RX ORDER — DOCUSATE SODIUM 50 MG/5ML
100 LIQUID ORAL 2 TIMES DAILY PRN
Status: DISCONTINUED | OUTPATIENT
Start: 2023-10-15 | End: 2023-10-16 | Stop reason: HOSPADM

## 2023-10-15 RX ADMIN — DOCUSATE SODIUM 100 MG: 50 LIQUID ORAL at 09:10

## 2023-10-15 RX ADMIN — OXYCODONE HYDROCHLORIDE 10 MG: 10 TABLET ORAL at 10:10

## 2023-10-15 RX ADMIN — OXYCODONE HYDROCHLORIDE 10 MG: 10 TABLET ORAL at 04:10

## 2023-10-15 RX ADMIN — ENOXAPARIN SODIUM 40 MG: 40 INJECTION SUBCUTANEOUS at 09:10

## 2023-10-15 RX ADMIN — POLYETHYLENE GLYCOL 3350 17 G: 17 POWDER, FOR SOLUTION ORAL at 09:10

## 2023-10-15 RX ADMIN — THERA TABS 1 TABLET: TAB at 09:10

## 2023-10-15 RX ADMIN — Medication 1 TABLET: at 09:10

## 2023-10-15 RX ADMIN — POLYETHYLENE GLYCOL 3350 17 G: 17 POWDER, FOR SOLUTION ORAL at 08:10

## 2023-10-15 RX ADMIN — THIAMINE HCL TAB 100 MG 100 MG: 100 TAB at 09:10

## 2023-10-15 RX ADMIN — GABAPENTIN 300 MG: 300 CAPSULE ORAL at 09:10

## 2023-10-15 RX ADMIN — PANTOPRAZOLE SODIUM 40 MG: 40 TABLET, DELAYED RELEASE ORAL at 09:10

## 2023-10-15 RX ADMIN — FOLIC ACID 1 MG: 1 TABLET ORAL at 09:10

## 2023-10-15 RX ADMIN — ENOXAPARIN SODIUM 40 MG: 40 INJECTION SUBCUTANEOUS at 08:10

## 2023-10-15 RX ADMIN — Medication 6 MG: at 08:10

## 2023-10-15 RX ADMIN — GABAPENTIN 300 MG: 300 CAPSULE ORAL at 08:10

## 2023-10-15 RX ADMIN — GABAPENTIN 300 MG: 300 CAPSULE ORAL at 04:10

## 2023-10-15 RX ADMIN — Medication 1 TABLET: at 08:10

## 2023-10-15 RX ADMIN — OXYCODONE HYDROCHLORIDE 10 MG: 10 TABLET ORAL at 08:10

## 2023-10-15 NOTE — PT/OT/SLP PROGRESS
Physical Therapy Treatment    Patient Name:  José Henry   MRN:  [id number]    Recommendations:     Discharge Recommendations: home with home health  Discharge Equipment Recommendations: platform, walker, rolling, bath bench, bedside commode  Barriers to discharge: Inaccessible home and Decreased caregiver support    Assessment:     José Henry is a 47 y.o. male admitted with a medical diagnosis of <principal problem not specified>.  He presents with the following impairments/functional limitations: impaired functional mobility, gait instability, impaired balance, decreased upper extremity function, decreased lower extremity function, pain, orthopedic precautions .    Rehab Prognosis: Good; patient would benefit from acute skilled PT services to address these deficits and reach maximum level of function.    Recent Surgery: Procedure(s) (LRB):  ARTHROPLASTY, HIP (Right) 16 Days Post-Op    Plan:     During this hospitalization, patient to be seen 6 x/week to address the identified rehab impairments via gait training, therapeutic activities, therapeutic exercises, neuromuscular re-education and progress toward the following goals:    Plan of Care Expires:  11/01/23    Subjective     Chief Complaint: none  Patient/Family Comments/goals: to go home  Pain/Comfort:  Pain Rating 1: 0/10      Objective:     Communicated with pts nurse prior to session.  Patient found HOB elevated with telemetry, TLSO upon PT entry to room.     General Precautions: Standard, fall  Orthopedic Precautions: spinal precautions, RLE non weight bearing, RUE non weight bearing, RLE posterior precautions  Braces: TLSO (CAM boot)  Respiratory Status: Room air  Blood Pressure: NT  Skin Integrity: Visible skin intact      Functional Mobility:  Bed Mobility:     Scooting: modified independence  Supine to Sit: modified independence  Transfers:     Sit to Stand:  contact guard assistance with rolling walker  Bed to Chair: contact guard assistance with   rolling walker  using  Step Transfer  Gait: Pt ambulated 250 ft w/ PRW, remaining NWB on R LE, hop to gait, CGA w/ verbal cues to improve mechanics.  Pt ambulated up and down ramp x 2 trial CGA responding well to cues for pushing walker and stepping into walker to improve safety.       Education:  Patient provided with verbal education education regarding safety w/ functional mobility.  Understanding was verbalized, however additional teaching warranted.     Patient left up in chair with all lines intact, call button in reach, and wife present..    GOALS:   Multidisciplinary Problems       Physical Therapy Goals          Problem: Physical Therapy    Goal Priority Disciplines Outcome Goal Variances Interventions   Physical Therapy Goal     PT, PT/OT Ongoing, Progressing     Description: Goals to be met by: 2023     Patient will increase functional independence with mobility by performin. Supine to sit with Modified [place]  2. Sit to stand transfer with Modified [place]  3. Gait  x 150 feet with Modified [place] using platform RW.   4. Pt navigates ramp using platform RW modified independent.                         Time Tracking:     PT Received On: 10/15/23  PT Start Time: 1420     PT Stop Time: 1446  PT Total Time (min): 26 min     Billable Minutes: Gait Training 13 and Therapeutic Activity 13    Treatment Type: Treatment  PT/PTA: PTA     Number of PTA visits since last PT visit: 1     10/15/2023

## 2023-10-15 NOTE — PROGRESS NOTES
"   Trauma Surgery   Progress Note  Admit Date: 9/23/2023  HD#22  POD#16 Days Post-Op    Subjective:   Interval history:  NAEON  AFVSS  Patient is having Bms, and passing flatus  UOP appropriate  Tolerating regular diet without n/v  PT recommending  Home with HH  Pain well controlled     Home Meds:   Current Outpatient Medications   Medication Instructions    dextroamphetamine-amphetamine 30 mg Tab 1 tablet, Oral, Daily    pantoprazole (PROTONIX) 40 MG tablet 1 tablet, Oral, Daily      Scheduled Meds:   calcium-vitamin D3  1 tablet Oral BID    docusate  100 mg Oral BID    enoxparin  40 mg Subcutaneous Q12H (prophylaxis, 0900/2100)    folic acid  1 mg Oral Daily    gabapentin  300 mg Oral TID    multivitamin  1 tablet Oral Daily    pantoprazole  40 mg Oral Daily    polyethylene glycol  17 g Oral BID    thiamine  100 mg Oral Daily     Continuous Infusions:      PRN Meds:bisacodyL, heparin, porcine (PF), melatonin, metoclopramide HCl, ondansetron, oxyCODONE, promethazine     Objective:     VITAL SIGNS: 24 HR MIN & MAX LAST   Temp  Min: 97.8 °F (36.6 °C)  Max: 98.9 °F (37.2 °C)  98.1 °F (36.7 °C)   BP  Min: 104/71  Max: 115/77  111/71    Pulse  Min: 74  Max: 80  74    Resp  Min: 18  Max: 19  19    SpO2  Min: 98 %  Max: 100 %  99 %      HT: 6' 2.02" (188 cm)  WT: 93.9 kg (207 lb)  BMI: 26.6     Intake/output:  Intake/Output - Last 3 Shifts         10/13 0700  10/14 0659 10/14 0700  10/15 0659 10/15 0700  10/16 0659    Urine (mL/kg/hr) 1700 (0.8) 1000 (0.4)     Total Output 1700 1000     Net -1700 -1000                    Intake/Output Summary (Last 24 hours) at 10/15/2023 0842  Last data filed at 10/14/2023 2320  Gross per 24 hour   Intake --   Output 1000 ml   Net -1000 ml           Lines/drains/airway:       Peripheral IV - Single Lumen 09/26/23 2200 Anterior;Left Forearm (Active)   Site Assessment Clean;Dry;Intact;No redness;No swelling;No warmth;No drainage 10/01/23 0400   Extremity Assessment Distal to IV No abnormal " discoloration 10/01/23 0400   Line Status Infusing 10/01/23 0400   Dressing Status Clean;Dry;Intact 10/01/23 0400   Dressing Intervention Integrity maintained 10/01/23 0400   Number of days: 4            Peripheral IV - Single Lumen 09/27/23 0728 18 G Left Hand (Active)   Site Assessment Clean;Dry;Intact;No redness;No swelling;No warmth;No drainage 10/01/23 0400   Extremity Assessment Distal to IV No abnormal discoloration 10/01/23 0400   Line Status Saline locked 10/01/23 0400   Dressing Status Clean;Dry;Intact 10/01/23 0400   Dressing Intervention Integrity maintained 10/01/23 0400   Number of days: 4            NG/OG Tube 09/27/23 1730 nasogastric Left nostril (Active)   Placement Check placement verified by aspirate characteristics 10/01/23 0400   Distal Tube Length (cm) 53 10/01/23 0000   Tolerance no signs/symptoms of discomfort 10/01/23 0400   Securement secured to nostril center w/ adhesive device 10/01/23 0400   Clamp Status/Tolerance unclamped;no emesis;no nausea;no restlessness 10/01/23 0400   Suction Setting/Drainage Method suction at;low;intermittent setting 10/01/23 0400   Insertion Site Appearance no redness, warmth, tenderness, skin breakdown, drainage 10/01/23 0400   Drainage Bile;Green 10/01/23 0400   Flush/Irrigation flushed w/;water;no resistance met 09/29/23 2000   Tube Output(mL)(Include Discarded Residual) 250 mL 10/01/23 0528   Number of days: 3       Male External Urinary Catheter 09/30/23 0500 (Active)   Collection Container Urimeter 10/01/23 0400   Securement Method secured to top of thigh w/ adhesive device 10/01/23 0400   Skin no redness;no breakdown;penis/scrotum cleansed w/ soap and water 10/01/23 0400   Tolerance no signs/symptoms of discomfort;assisted with appliance change 10/01/23 0400   Output (mL) 100 mL 10/01/23 0528   Catheter Change Date 09/30/23 10/01/23 0400   Catheter Change Time 0430 10/01/23 0400   Number of days: 1       Physical examination:  Gen: NAD, AAOx3, answering  "questions appropriately  HEENT: normocephalic, atraumatic, EOMI, PERRL  CV: RR  Resp: NWOB  Abd: S/distended, nontender  Msk: moving all extremities spontaneously and purposefully- reduced to RUE/RLE. Ortho dressings removed  Neuro: CN II-XII grossly intact GCS 15  Skin/wounds: warm dry, dressings to RUE RLE    Labs:  Renal:  No results for input(s): "BUN", "CREATININE" in the last 72 hours.      No results for input(s): "LACTIC" in the last 72 hours.  FEN/GI:  No results for input(s): "NA", "K", "CL", "CO2", "CALCIUM", "MG", "PHOS", "PROT", "ALBUMIN", "BILITOT", "AST", "ALKPHOS", "ALT" in the last 72 hours.      Heme:  Recent Labs     10/13/23  1111   HGB 8.5*   HCT 26.5*   *         ID:  Recent Labs     10/13/23  1111   WBC 7.37         CBG:  No results for input(s): "GLUCOSE" in the last 72 hours.       No results for input(s): "POCTGLUCOSE" in the last 72 hours.   Cardiovascular:  No results for input(s): "TROPONINI", "CKTOTAL", "CKMB", "BNP" in the last 168 hours.  I have reviewed all pertinent lab results within the past 24 hours.    Imaging:  CT Abdomen Pelvis With Contrast   Final Result      1. Moderate dilatation of small-bowel loops with gradual transition to normal caliber small bowel distally.  Favor ileus over obstruction.   2. Fluid-filled colon which could be seen with a mild colitis or other diarrheal illness.   3. Enteric tube tip just past the GE junction.  Suggest advancing at least 5 cm if possible.   4. Other findings above.         Electronically signed by: Ritchie Coker   Date:    10/02/2023   Time:    13:04      X-Ray Abdomen AP 1 View   Final Result      As above.         Electronically signed by: Stevo Thorne   Date:    10/01/2023   Time:    09:36      X-Ray Hip 1 View Right (with Pelvis when performed)   Final Result      Expected postoperative changes.         Electronically signed by: Stevo Thorne   Date:    09/29/2023   Time:    18:23      X-Ray Chest 1 View   Final Result    "   XR Gastric tube check, non-radiologist performed   Final Result      Nasogastric tube terminates within the gastric fundus.         Electronically signed by: Desmond Villa   Date:    09/27/2023   Time:    17:51      SURG FL Surgery Fluoro Usage   Final Result      X-Ray Abdomen AP 1 View   Final Result      Progressive gaseous distension of the bowel.  Consider ileus versus distal obstruction.         Electronically signed by: Kathie Atwood   Date:    09/27/2023   Time:    06:42      MRI Lumbar Spine Without Contrast   Final Result      1. L3 vertebral body superior half acute compression deformity with paraspinal soft inflammations.  Combination retropulsed bony fragment and ventral epidural hematoma results in central canal stenosis.      2. Lumbar degenerative disc disease and spondylosis level by level discussed above.         Electronically signed by: Desmond Villa   Date:    09/23/2023   Time:    15:40      MRI Thoracic Spine Without Contrast   Final Result      No evidence for acute osseous finding or static listhesis      No significant herniation or bulge.  No canal, cord, or foraminal compromise      Mild thoracic spondylosis.         Electronically signed by: Jose Antonio Mak MD   Date:    09/23/2023   Time:    15:33      CT Ankle (Including Hindfoot) Without Contrast Right   Final Result      Calcaneus fracture as described above.         Electronically signed by: Jose Antonio Mak MD   Date:    09/23/2023   Time:    14:21      X-Ray Hand 3 view Left   Final Result      No acute osseous abnormality identified.         Electronically signed by: Desmond Villa   Date:    09/23/2023   Time:    13:53      X-Ray Knee Complete 4 or More Views Left   Final Result      No acute osseous abnormality identified.         Electronically signed by: Desmond Villa   Date:    09/23/2023   Time:    13:51      Xray Previous   Final Result      Xray Previous   Final Result      Xray Previous   Final Result      CT Previous    Final Result      CT Previous   Final Result      CT Previous   Final Result      CT Previous   Final Result      X-Ray Pelvis Routine AP   Final Result      Right subcapital femoral neck fracture.         Electronically signed by: Jose Antonio Mak MD   Date:    09/23/2023   Time:    12:31      X-Ray Femur Ap/Lat Right   Final Result      Right subcapital femoral neck fracture.         Electronically signed by: Jose Antonio Mak MD   Date:    09/23/2023   Time:    12:27      X-Ray Wrist Complete Right   Final Result      Mildly displaced dorsal triquetral fracture.      Nondisplaced distal radius fracture.         Electronically signed by: Jose Antonio Mak MD   Date:    09/23/2023   Time:    11:45      X-Ray Ankle Complete Right   Final Result      Fractured calcaneus.         Electronically signed by: Desmond Villa   Date:    09/23/2023   Time:    11:45         I have reviewed all pertinent imaging results/findings within the past 24 hours.    Micro/Path/Other:  Microbiology Results (last 7 days)       Procedure Component Value Units Date/Time    Blood Culture [[id number]]  (Normal) Collected: 10/03/23 0824    Order Status: Completed Specimen: Blood Updated: 10/08/23 1000     CULTURE, BLOOD ([facility]) No Growth at 5 days           Specimen (168h ago, onward)      None             Problems list:  Active Problem List with Overview Notes    Diagnosis Date Noted    Ileus 10/03/2023    Closed nondisplaced fracture of right calcaneus 09/24/2023    Closed fracture of right femur 09/24/2023    Epidural hematoma 09/24/2023    Spinal stenosis of lumbar region 09/24/2023    Closed fracture of third lumbar vertebra 09/23/2023    Radius fracture 09/23/2023    Closed right hip fracture 09/23/2023    Displaced fracture of body of right talus, initial encounter for closed fracture 09/23/2023        Assessment & Plan:   47 y.o. male admitted on 9/23/2023 following  fell or slept wall fall from a balcony of approximally 12th and 13th feet.  He  was found to have right hip fracture, burst fracture of L3 with retropulsion, central cord stenosis based on CT re, 13 mm lytic lesion with sclerotic rim and thinned zone of transition in the left iliac bone which is a incidental finding, comminuted fracture of the calcaneus with minimally displaced fracture of the talus.      Consults:   Neurosurgery  Orthopedic surgery   Therapy:  Physical Therapy  Occupational Therapy Weight bearing status:   NWB RLE, RUE Precautions:  Fall and Spinal   Seizure prophylaxis:  Not indicated. VTE prophylaxis:     Prophylactic Lovenox 40mg BID GI prophylaxis:  H2B   Outpatient follow up:  Orthopedic surgery  Neurosurgery  Disposition:  Rehab vs HH w/ DME       -Tolerating regular diet  -Ambulating well with PT recommending  Home with HH   -will need 3 week fu w/ Ortho for calcaneous/wrist fx   -Case management assisting with medicaid approval  - PO and IV pain meds  -  PO PPI daily  - Lovenox for DVT prophylaxis    Lalit Pike MD   [employer] General Surgery, PGY-1

## 2023-10-16 VITALS
SYSTOLIC BLOOD PRESSURE: 117 MMHG | HEART RATE: 82 BPM | TEMPERATURE: 98 F | OXYGEN SATURATION: 100 % | WEIGHT: 207 LBS | HEIGHT: 74 IN | BODY MASS INDEX: 26.56 KG/M2 | RESPIRATION RATE: 18 BRPM | DIASTOLIC BLOOD PRESSURE: 79 MMHG

## 2023-10-16 PROBLEM — M25.551 RIGHT HIP PAIN: Status: ACTIVE | Noted: 2023-10-16

## 2023-10-16 PROBLEM — M25.571 ACUTE RIGHT ANKLE PAIN: Status: ACTIVE | Noted: 2023-10-16

## 2023-10-16 PROBLEM — T14.90XA TRAUMA: Status: ACTIVE | Noted: 2023-10-16

## 2023-10-16 PROBLEM — R52 PAIN: Status: ACTIVE | Noted: 2023-10-16

## 2023-10-16 PROBLEM — M25.531 RIGHT WRIST PAIN: Status: ACTIVE | Noted: 2023-10-16

## 2023-10-16 PROBLEM — S92.134A CLOSED NONDISPLACED FRACTURE OF POSTERIOR PROCESS OF RIGHT TALUS: Status: ACTIVE | Noted: 2023-10-16

## 2023-10-16 PROBLEM — S09.90XA CLOSED HEAD INJURY: Status: ACTIVE | Noted: 2023-10-16

## 2023-10-16 PROBLEM — S32.032A: Status: ACTIVE | Noted: 2023-10-16
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
## 2023-10-16 LAB
ALBUMIN SERPL-MCNC: 3.2 G/DL (ref 3.5–5)
ALBUMIN/GLOB SERPL: 1.1 RATIO (ref 1.1–2)
ALP SERPL-CCNC: 133 UNIT/L (ref 40–150)
ALT SERPL-CCNC: 50 UNIT/L (ref 0–55)
AST SERPL-CCNC: 44 UNIT/L (ref 5–34)
BASOPHILS # BLD AUTO: 0.02 X10(3)/MCL
BASOPHILS NFR BLD AUTO: 0.3 %
BILIRUB SERPL-MCNC: 0.4 MG/DL
BUN SERPL-MCNC: 13.4 MG/DL (ref 8.9–20.6)
CALCIUM SERPL-MCNC: 9.2 MG/DL (ref 8.4–10.2)
CHLORIDE SERPL-SCNC: 102 MMOL/L (ref 98–107)
CO2 SERPL-SCNC: 26 MMOL/L (ref 22–29)
CREAT SERPL-MCNC: 0.77 MG/DL (ref 0.73–1.18)
CRP SERPL-MCNC: 12.8 MG/L
EOSINOPHIL # BLD AUTO: 0.1 X10(3)/MCL (ref 0–0.9)
EOSINOPHIL NFR BLD AUTO: 1.6 %
ERYTHROCYTE [DISTWIDTH] IN BLOOD BY AUTOMATED COUNT: 14.9 % (ref 11.5–17)
GFR SERPLBLD CREATININE-BSD FMLA CKD-EPI: >60 MLS/MIN/1.73/M2
GLOBULIN SER-MCNC: 2.9 GM/DL (ref 2.4–3.5)
GLUCOSE SERPL-MCNC: 106 MG/DL (ref 74–100)
HCT VFR BLD AUTO: 29.8 % (ref 42–52)
HGB BLD-MCNC: 9.4 G/DL (ref 14–18)
IMM GRANULOCYTES # BLD AUTO: 0.1 X10(3)/MCL (ref 0–0.04)
IMM GRANULOCYTES NFR BLD AUTO: 1.6 %
LYMPHOCYTES # BLD AUTO: 1.45 X10(3)/MCL (ref 0.6–4.6)
LYMPHOCYTES NFR BLD AUTO: 22.5 %
MAGNESIUM SERPL-MCNC: 1.9 MG/DL (ref 1.6–2.6)
MCH RBC QN AUTO: 28.6 PG (ref 27–31)
MCHC RBC AUTO-ENTMCNC: 31.5 G/DL (ref 33–36)
MCV RBC AUTO: 90.6 FL (ref 80–94)
MONOCYTES # BLD AUTO: 0.71 X10(3)/MCL (ref 0.1–1.3)
MONOCYTES NFR BLD AUTO: 11 %
NEUTROPHILS # BLD AUTO: 4.06 X10(3)/MCL (ref 2.1–9.2)
NEUTROPHILS NFR BLD AUTO: 63 %
NRBC BLD AUTO-RTO: 0 %
PHOSPHATE SERPL-MCNC: 5.1 MG/DL (ref 2.3–4.7)
PLATELET # BLD AUTO: 636 X10(3)/MCL (ref 130–400)
PMV BLD AUTO: 8.5 FL (ref 7.4–10.4)
POTASSIUM SERPL-SCNC: 4.7 MMOL/L (ref 3.5–5.1)
PREALB SERPL-MCNC: 21.8 MG/DL (ref 18–45)
PROT SERPL-MCNC: 6.1 GM/DL (ref 6.4–8.3)
RBC # BLD AUTO: 3.29 X10(6)/MCL (ref 4.7–6.1)
SODIUM SERPL-SCNC: 136 MMOL/L (ref 136–145)
WBC # SPEC AUTO: 6.44 X10(3)/MCL (ref 4.5–11.5)

## 2023-10-16 PROCEDURE — 86140 C-REACTIVE PROTEIN: CPT | Performed by: NURSE PRACTITIONER

## 2023-10-16 PROCEDURE — 83735 ASSAY OF MAGNESIUM: CPT

## 2023-10-16 PROCEDURE — 25000003 PHARM REV CODE 250: Performed by: NURSE PRACTITIONER

## 2023-10-16 PROCEDURE — 84100 ASSAY OF PHOSPHORUS: CPT

## 2023-10-16 PROCEDURE — 85025 COMPLETE CBC W/AUTO DIFF WBC: CPT

## 2023-10-16 PROCEDURE — 80053 COMPREHEN METABOLIC PANEL: CPT

## 2023-10-16 PROCEDURE — 84134 ASSAY OF PREALBUMIN: CPT | Performed by: NURSE PRACTITIONER

## 2023-10-16 RX ORDER — HYDROCODONE BITARTRATE AND ACETAMINOPHEN 10; 325 MG/1; MG/1
1 TABLET ORAL EVERY 6 HOURS PRN
Qty: 12 TABLET | Refills: 0 | Status: SHIPPED | OUTPATIENT
Start: 2023-10-16 | End: 2023-10-21

## 2023-10-16 RX ORDER — METHOCARBAMOL 500 MG/1
500 TABLET, FILM COATED ORAL 4 TIMES DAILY
Qty: 40 TABLET | Refills: 0 | Status: SHIPPED | OUTPATIENT
Start: 2023-10-16 | End: 2023-10-26

## 2023-10-16 RX ADMIN — THIAMINE HCL TAB 100 MG 100 MG: 100 TAB at 08:10

## 2023-10-16 RX ADMIN — OXYCODONE HYDROCHLORIDE 10 MG: 10 TABLET ORAL at 02:10

## 2023-10-16 RX ADMIN — GABAPENTIN 300 MG: 300 CAPSULE ORAL at 08:10

## 2023-10-16 RX ADMIN — FOLIC ACID 1 MG: 1 TABLET ORAL at 08:10

## 2023-10-16 RX ADMIN — PANTOPRAZOLE SODIUM 40 MG: 40 TABLET, DELAYED RELEASE ORAL at 08:10

## 2023-10-16 RX ADMIN — OXYCODONE HYDROCHLORIDE 10 MG: 10 TABLET ORAL at 01:10

## 2023-10-16 RX ADMIN — Medication 1 TABLET: at 08:10

## 2023-10-16 RX ADMIN — OXYCODONE HYDROCHLORIDE 10 MG: 10 TABLET ORAL at 08:10

## 2023-10-16 RX ADMIN — THERA TABS 1 TABLET: TAB at 08:10

## 2023-10-16 NOTE — PT/OT/SLP PROGRESS
Attempted to see pt for PT tx. Pt politely decline PT tx stating he was just waiting to be d/c. Issues with PFRW and CM having to order new one to accommodate platform properly. Gait belt administered to pt. Time spent: [id number].

## 2023-10-16 NOTE — PLAN OF CARE
Equipment platform  and BSC has been paid for per Naomy waiting for delivery. Pt will dc home once it arrives.   Referral to [facility] with PT OT in the home . Referral sent to [facility] per Delaney .Referral sent through [facility] port

## 2023-10-16 NOTE — PLAN OF CARE
Pt is dc . No funding yet. Pt will return home with family and needs right platform and BSC. This discussed with Ramirez director of case managerment. I have a walker and just need the right platform and BSC   Family will shop for the bath bench and also provide him with 27/7 care. Including assist with personal cares etc.   Pt will be dc as soon as equipment arrives. Pts sister will transport

## 2023-10-16 NOTE — HOSPITAL COURSE
47-year-old male presents to the hospital as a trauma transfer.  By report he fell or slept wall fall from a balcony of approximally 12th and 13th feet.  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  GCS 15.  Family at bedside.  Denies any past medical history.  Pain is moderately controlled.  He has a sugar-tong splint to the right upper extremity and a short-leg splint to the right lower extremity.  His vital signs are stable.  Orthopedics and neurosurgery aware of the patient.  Both believe these may be operative  injuries pending further evaluation.  He was MRIs that are pending as well. Patient underwent R hip arthoplasty with orthopedic surgery on 9/29 and lumbar spinal fusion on 9/27. Patient did well post-operatively. He has been tolerating PO intake and has return of bowel function. He has been ambulating and working with PT/OT. He is medically stable for discharge.

## 2023-10-16 NOTE — DISCHARGE SUMMARY
[facility]  General Surgery  Discharge Summary      Patient Name: José Henry  MRN: [id number]  Admission Date: 9/23/2023  Hospital Length of Stay: 23 days  Discharge Date and Time:  10/16/2023 10:26 AM  Attending Physician: Jerzy Norris MD   Discharging Provider: Berenice Pérez MD  Primary Care Provider: Brayden Welsh MD    HPI:     47-year-old male presents to the hospital as a trauma transfer.  By report he fell or slept wall fall from a balcony of approximally 12th and 13th feet.  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  GCS 15.  Family at bedside.  Denies any past medical history.  Pain is moderately controlled.  He has a sugar-tong splint to the right upper extremity and a short-leg splint to the right lower extremity.  His vital signs are stable.  Orthopedics and neurosurgery aware of the patient.  Both believe these may be operative  injuries pending further evaluation.  He was MRIs that are pending as well.      Procedure(s) (LRB):  ARTHROPLASTY, HIP (Right)      Indwelling Lines/Drains at time of discharge:   Lines/Drains/Airways     None               Hospital Course: 47-year-old male presents to the hospital as a trauma transfer.  By report he fell or slept wall fall from a balcony of approximally 12th and 13th feet.  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  GCS 15.  Family at bedside.  Denies any past  medical history.  Pain is moderately controlled.  He has a sugar-tong splint to the right upper extremity and a short-leg splint to the right lower extremity.  His vital signs are stable.  Orthopedics and neurosurgery aware of the patient.  Both believe these may be operative  injuries pending further evaluation.  He was MRIs that are pending as well. Patient underwent R hip arthoplasty with orthopedic surgery on 9/29 and lumbar spinal fusion on 9/27. Patient did well post-operatively. He has been tolerating PO intake and has return of bowel function. He has been ambulating and working with PT/OT. He is medically stable for discharge.       Goals of Care Treatment Preferences:  Code Status: Full Code      Consults:   Consults (From admission, onward)        Status Ordering Provider     Inpatient consult to Orthotics  Once        Provider:  Jarvis Armenta    Acknowledged WYATT PEGUERO     Inpatient consult to Social Work/Case Management  Once        Provider:  (Not yet assigned)    KIERA Toscano     Inpatient consult to Orthotics  Once        Provider:  (Not yet assigned)    Acknowledged KIERA DIAZ     Inpatient consult to Orthopedic Surgery  Once        Provider:  Vance Stover DO    Completed MARTHA GUAJARDO     Inpatient consult to Neurosurgery  Once        Provider:  Kiera Diaz MD    Completed MARTHA GUAJARDO          Significant Diagnostic Studies: Labs:   CMP   Recent Labs   Lab 10/16/23  0421      K 4.7   CO2 26   BUN 13.4   CREATININE 0.77   CALCIUM 9.2   ALBUMIN 3.2*   BILITOT 0.4   ALKPHOS 133   AST 44*   ALT 50    and CBC   Recent Labs   Lab 10/16/23  0421   WBC 6.44   HGB 9.4*   HCT 29.8*   *     Radiology: X-Ray: CXR: X-Ray Chest 1 View (CXR):   Results for orders placed or performed during the hospital encounter of 09/23/23   X-Ray Chest 1 View    Narrative    EXAMINATION  XR CHEST 1 VIEW    CLINICAL HISTORY  fever, hypoxia;    TECHNIQUE  A  total of 1 frontal image(s) of the chest.    COMPARISON  None available at the time of initial interpretation.    FINDINGS  Lines/tubes/devices: ECG leads overlie the imaged region.  Esophageal tube is present, extending below the level the diaphragm and side port at the level of the GE junction.    The cardiomediastinal silhouette and central pulmonary vasculature are unremarkable for utilized technique.  The trachea is midline. There is no lobar consolidation, pleural effusion, or pneumothorax.    There is no acute osseous or extrathoracic abnormality.    IMPRESSION  No convincing acute radiographic abnormality.      Electronically signed by: Rashi Gallego  Date:    09/28/2023  Time:    06:53    and X-Ray Chest PA and Lateral (CXR): No results found for this visit on 09/23/23. and KUB: X-Ray Abdomen AP 1 View (KUB):   Results for orders placed or performed during the hospital encounter of 09/23/23   X-Ray Abdomen AP 1 View    Narrative    EXAMINATION:  XR ABDOMEN AP 1 VIEW    CLINICAL HISTORY:  eval ileus, gas pattern;  Unspecified fall, initial encounter    TECHNIQUE:  Single-view of the abdomen    COMPARISON:  09/27/2023    FINDINGS:  Increasing gas-filled loops of bowel throughout the abdomen.  Findings may represent obstruction.  Further evaluation is limited secondary to lack of upright imaging.      Impression    As above.      Electronically signed by: Stevo Thorne  Date:    10/01/2023  Time:    09:36     CT scan: CT ABDOMEN PELVIS WITH CONTRAST:   Results for orders placed or performed during the hospital encounter of 09/23/23   CT Abdomen Pelvis With Contrast    Narrative    EXAMINATION:  CT ABDOMEN PELVIS WITH CONTRAST    CLINICAL HISTORY:  Generalized abdominal painAbdominal pain, acute, nonlocalized;    TECHNIQUE:  Helical acquisition through the abdomen and pelvis with IV contrast.  Three plane reconstructions were provided for review. DLP 1041 mGycm. Automatic exposure control, adjustment of mA/kV  or iterative reconstruction technique was used to reduce radiation.    COMPARISON:  23 September 2023    FINDINGS:  There is mild bibasilar atelectasis.    There is no significant abnormality of the liver, gallbladder, spleen, pancreas or adrenals.  There is no hydronephrosis or suspicious renal lesion    Enteric tube tip is just past the GE junction.  There is dilatation of small-bowel loops with air-fluid levels.  Small-bowel loops measure up to 4-5 cm.  There is gradual transition to normal caliber small bowel distally.  There is no evidence of appendicitis.  There is no free air.  Fluid-filled colon.    Urinary bladder unremarkable.  No significant pelvic free fluid.  Abdominal aorta normal in caliber.  Moderate atherosclerotic disease.    Interval right hip arthroplasty with some air in fluid around the right hip likely postsurgical.  Interval lumbar fusion as well.      Impression    1. Moderate dilatation of small-bowel loops with gradual transition to normal caliber small bowel distally.  Favor ileus over obstruction.  2. Fluid-filled colon which could be seen with a mild colitis or other diarrheal illness.  3. Enteric tube tip just past the GE junction.  Suggest advancing at least 5 cm if possible.  4. Other findings above.      Electronically signed by: Ritchie Coker  Date:    10/02/2023  Time:    13:04    and CT ABDOMEN PELVIS WITHOUT CONTRAST: No results found for this visit on 09/23/23.    Pending Diagnostic Studies:     None        Final Active Diagnoses:    Diagnosis Date Noted POA    PRINCIPAL PROBLEM:  Trauma [T14.90XA] 10/16/2023 Unknown    Pain [R52] 10/16/2023 Unknown    Right hip pain [M25.551] 10/16/2023 Unknown    Right wrist pain [M25.531] 10/16/2023 Unknown    Closed unstable burst fracture of third lumbar vertebra [S32.032A] 10/16/2023 Unknown    Closed head injury [S09.90XA] 10/16/2023 Unknown    Fall [W19.XXXA] 10/16/2023 Unknown    Closed nondisplaced fracture of posterior process  of right talus [S92.134A] 10/16/2023 Unknown    Acute right ankle pain [M25.571] 10/16/2023 Unknown    Ileus [K56.7] 10/03/2023 Yes    Closed nondisplaced fracture of right calcaneus [S92.001A] 09/24/2023 Yes    Closed fracture of right femur [S72.91XA] 09/24/2023 Yes    Epidural hematoma [S06.4XAA] 09/24/2023 Yes    Spinal stenosis of lumbar region [M48.061] 09/24/2023 Yes    Closed fracture of third lumbar vertebra [S32.039A] 09/23/2023 Yes    Radius fracture [S52.90XA] 09/23/2023 Yes    Closed right hip fracture [S72.001A] 09/23/2023 Yes    Displaced fracture of body of right talus, initial encounter for closed fracture [S92.121A] 09/23/2023 Yes      Problems Resolved During this Admission:      Discharged Condition: good    Disposition: Home or Self Care    Follow Up:   Follow-up Information     Vance Stover DO. Go on 11/2/2023.    Specialty: Orthopedic Surgery  Why: Appointment on 11/02/23 @ 8:15am.  Contact information:  [address] [place] [id number]  [phone]                       Patient Instructions:      Diet Adult Regular     Notify your health care provider if you experience any of the following:  persistent nausea and vomiting or diarrhea     Notify your health care provider if you experience any of the following:  temperature >100.4     Notify your health care provider if you experience any of the following:  severe uncontrolled pain     Notify your health care provider if you experience any of the following:  redness, tenderness, or signs of infection (pain, swelling, redness, odor or green/yellow discharge around incision site)     Activity as tolerated     Medications:  Reconciled Home Medications:      Medication List      START taking these medications    HYDROcodone-acetaminophen  mg per tablet  Commonly known as: NORCO  Take 1 tablet by mouth every 6 (six) hours as needed for Pain.     methocarbamoL 500 MG Tab  Commonly known as: ROBAXIN  Take 1 tablet  (500 mg total) by mouth 4 (four) times daily. for 10 days        CONTINUE taking these medications    dextroamphetamine-amphetamine 30 mg Tab  Take 1 tablet by mouth once daily.     pantoprazole 40 MG tablet  Commonly known as: PROTONIX  Take 1 tablet by mouth once daily.          Time spent on the discharge of patient: 30 minutes    Berenice Pérez MD  General Surgery  [facility]

## 2023-10-17 DIAGNOSIS — S32.032D: Primary | ICD-10-CM

## 2023-10-17 PROCEDURE — G0180 PR HOME HEALTH MD CERTIFICATION: ICD-10-PCS | Mod: ,,, | Performed by: NURSE PRACTITIONER

## 2023-10-17 PROCEDURE — G0180 MD CERTIFICATION HHA PATIENT: HCPCS | Mod: ,,, | Performed by: NURSE PRACTITIONER

## 2023-10-17 NOTE — TELEPHONE ENCOUNTER
I see that the patient was D/C from the hospital. I spoke with him to see if he was in a rehab facility. He stated he is home now. I did inquire about getting his hospital F/U scheduled. I scheduled him with Nancy for 11/9/23 at 2:30. His XR is scheduled at [facility] for 11/7/23 at 10:30. He was compliant w date and time. I did give him both address's and our number incase he had any questions before.

## 2023-10-25 ENCOUNTER — TELEPHONE (OUTPATIENT)
Dept: ORTHOPEDICS | Facility: CLINIC | Age: 48
End: 2023-10-25
Payer: MEDICAID

## 2023-11-07 ENCOUNTER — HOSPITAL ENCOUNTER (OUTPATIENT)
Dept: RADIOLOGY | Facility: CLINIC | Age: 48
Discharge: HOME | End: 2023-11-07
Attending: NURSE PRACTITIONER
Payer: MEDICAID

## 2023-11-07 ENCOUNTER — HOSPITAL ENCOUNTER (OUTPATIENT)
Dept: RADIOLOGY | Facility: HOSPITAL | Age: 48
Discharge: HOME | End: 2023-11-07
Attending: NEUROLOGICAL SURGERY
Payer: MEDICAID

## 2023-11-07 ENCOUNTER — OFFICE VISIT (OUTPATIENT)
Dept: ORTHOPEDICS | Facility: CLINIC | Age: 48
End: 2023-11-07
Payer: MEDICAID

## 2023-11-07 VITALS
BODY MASS INDEX: 25.74 KG/M2 | HEIGHT: 75 IN | DIASTOLIC BLOOD PRESSURE: 89 MMHG | SYSTOLIC BLOOD PRESSURE: 121 MMHG | HEART RATE: 104 BPM | WEIGHT: 207 LBS

## 2023-11-07 DIAGNOSIS — Z47.1 AFTERCARE FOLLOWING RIGHT HIP JOINT REPLACEMENT SURGERY: Primary | ICD-10-CM

## 2023-11-07 DIAGNOSIS — Z96.641 AFTERCARE FOLLOWING RIGHT HIP JOINT REPLACEMENT SURGERY: ICD-10-CM

## 2023-11-07 DIAGNOSIS — S92.001A CLOSED NONDISPLACED FRACTURE OF RIGHT CALCANEUS, UNSPECIFIED PORTION OF CALCANEUS, INITIAL ENCOUNTER: ICD-10-CM

## 2023-11-07 DIAGNOSIS — S52.501A CLOSED FRACTURE OF DISTAL END OF RIGHT RADIUS, UNSPECIFIED FRACTURE MORPHOLOGY, INITIAL ENCOUNTER: ICD-10-CM

## 2023-11-07 DIAGNOSIS — Z47.1 AFTERCARE FOLLOWING RIGHT HIP JOINT REPLACEMENT SURGERY: ICD-10-CM

## 2023-11-07 DIAGNOSIS — S32.032D: ICD-10-CM

## 2023-11-07 DIAGNOSIS — Z96.641 AFTERCARE FOLLOWING RIGHT HIP JOINT REPLACEMENT SURGERY: Primary | ICD-10-CM

## 2023-11-07 PROCEDURE — 99024 POSTOP FOLLOW-UP VISIT: CPT | Mod: ,,, | Performed by: NURSE PRACTITIONER

## 2023-11-07 PROCEDURE — 73502 X-RAY EXAM HIP UNI 2-3 VIEWS: CPT | Mod: RT,,, | Performed by: NURSE PRACTITIONER

## 2023-11-07 PROCEDURE — 73502 XR HIP WITH PELVIS WHEN PERFORMED, 2 OR 3  VIEWS RIGHT: ICD-10-PCS | Mod: RT,,, | Performed by: NURSE PRACTITIONER

## 2023-11-07 PROCEDURE — 1159F PR MEDICATION LIST DOCUMENTED IN MEDICAL RECORD: ICD-10-PCS | Mod: CPTII,,, | Performed by: NURSE PRACTITIONER

## 2023-11-07 PROCEDURE — 73650 X-RAY EXAM OF HEEL: CPT | Mod: RT,,, | Performed by: NURSE PRACTITIONER

## 2023-11-07 PROCEDURE — 3074F SYST BP LT 130 MM HG: CPT | Mod: CPTII,,, | Performed by: NURSE PRACTITIONER

## 2023-11-07 PROCEDURE — 3079F DIAST BP 80-89 MM HG: CPT | Mod: CPTII,,, | Performed by: NURSE PRACTITIONER

## 2023-11-07 PROCEDURE — 72100 X-RAY EXAM L-S SPINE 2/3 VWS: CPT | Mod: TC

## 2023-11-07 PROCEDURE — 73110 XR WRIST COMPLETE 3 VIEWS RIGHT: ICD-10-PCS | Mod: RT,,, | Performed by: NURSE PRACTITIONER

## 2023-11-07 PROCEDURE — 1159F MED LIST DOCD IN RCRD: CPT | Mod: CPTII,,, | Performed by: NURSE PRACTITIONER

## 2023-11-07 PROCEDURE — 3079F PR MOST RECENT DIASTOLIC BLOOD PRESSURE 80-89 MM HG: ICD-10-PCS | Mod: CPTII,,, | Performed by: NURSE PRACTITIONER

## 2023-11-07 PROCEDURE — 73650 XR CALCANEUS 2 VIEW RIGHT: ICD-10-PCS | Mod: RT,,, | Performed by: NURSE PRACTITIONER

## 2023-11-07 PROCEDURE — 73110 X-RAY EXAM OF WRIST: CPT | Mod: RT,,, | Performed by: NURSE PRACTITIONER

## 2023-11-07 PROCEDURE — 99024 PR POST-OP FOLLOW-UP VISIT: ICD-10-PCS | Mod: ,,, | Performed by: NURSE PRACTITIONER

## 2023-11-07 PROCEDURE — 3074F PR MOST RECENT SYSTOLIC BLOOD PRESSURE < 130 MM HG: ICD-10-PCS | Mod: CPTII,,, | Performed by: NURSE PRACTITIONER

## 2023-11-07 RX ORDER — METHOCARBAMOL 500 MG/1
500 TABLET, FILM COATED ORAL 4 TIMES DAILY
COMMUNITY
End: 2023-11-09

## 2023-11-07 RX ORDER — HYDROCODONE BITARTRATE AND ACETAMINOPHEN 10; 325 MG/1; MG/1
1 TABLET ORAL
COMMUNITY

## 2023-11-07 RX ORDER — HYDROCODONE BITARTRATE AND ACETAMINOPHEN 10; 325 MG/1; MG/1
1 TABLET ORAL EVERY 6 HOURS PRN
Qty: 28 TABLET | Refills: 0 | Status: SHIPPED | OUTPATIENT
Start: 2023-11-07 | End: 2023-11-09

## 2023-11-07 NOTE — PROGRESS NOTES
Chief Complaint:   Chief Complaint   Patient presents with    Right Hip - Follow-up     Pt states he is feeling well after his surgery. Pt is doing some stretches in his house, but haven't started PT outpatient yet.       History of present illness: José Henry is a 47 y.o. male, presents to clinic today about 6 weeks status post right total hip and also right calcaneus and distal radius fractures that were treated nonoperatively.  Patient has been compliant with nonweightbearing status to right lower extremity due to calcaneus fracture.  Walking boot in place.  Also has Exos splint to right wrist has been nonweightbearing.  Patient does state he has been removing the Exos splint in exercising the wrist.  Denies any pain to the right lower extremity.  Has not begun physical therapy due to no weight-bearing to right lower extremity.  Also had a lower lumbar spinal fracture as currently being treated by neurosurgery.    Past Medical History:   Diagnosis Date    GERD (gastroesophageal reflux disease)     Smoker        Past Surgical History:   Procedure Laterality Date    HIP ARTHROPLASTY Right 9/29/2023    Procedure: ARTHROPLASTY, HIP;  Surgeon: Tai Vallejo MD;  Location: [facility];  Service: Orthopedics;  Laterality: Right;  REQ. 12:30PM    POSTERIOR LUMBAR INTERBODY FUSION (PLIF) WITH COMPUTER-ASSISTED NAVIGATION N/A 9/27/2023    Procedure: FUSION, SPINE, LUMBAR, PLIF, USING COMPUTER-ASSISTED NAVIGATION;  Surgeon: Kiera Diaz MD;  Location: [facility];  Service: Neurosurgery;  Laterality: N/A;  L1-L5 fixation // L2-L3 laminectomy // NTI // medtronic // O-arm       Current Outpatient Medications   Medication Sig    dextroamphetamine-amphetamine 30 mg Tab Take 1 tablet by mouth once daily.    HYDROcodone-acetaminophen (NORCO)  mg per tablet Take 1 tablet by mouth.    HYDROcodone-acetaminophen (NORCO)  mg per tablet Take 1 tablet by mouth every 6 (six) hours as needed for Pain.    methocarbamoL  (ROBAXIN) 500 MG Tab Take 500 mg by mouth 4 (four) times daily.    pantoprazole (PROTONIX) 40 MG tablet Take 1 tablet by mouth once daily.     No current facility-administered medications for this visit.       Review of patient's allergies indicates:  No Known Allergies    Family History   Family history unknown: Yes       Social History     Socioeconomic History    Marital status:    Tobacco Use    Smoking status: Never    Smokeless tobacco: Current   Substance and Sexual Activity    Alcohol use: Not Currently     Alcohol/week: 6.0 standard drinks of alcohol     Types: 6 Cans of beer per week     Comment: drinks 5x per week/ 6 or more beers    Drug use: Never    Sexual activity: Yes     Partners: Female           Review of Systems:    Denies fevers, chills, chest pain, shortness of breath. Comprehensive review of systems performed and otherwise negative except as noted in HPI     Physical Examination:    General: awake and alert, no acute distress, healthy appearing  Head and Neck: Head atraumatic/normocephalic. Moist MM  CV: brisk cap refill  Lungs: non-labored breathing, w/o cough or SOB  Skin: no rashes present, warm to touch  Neuro: sensation grossly intact distally       Vital Signs:    Vitals:    11/07/23 1300   BP: 121/89   Pulse: 104       Body mass index is 25.87 kg/m².    Right wrist:  Skin warm, dry, intact.  No ecchymosis or erythema noted.    No significant swelling.  Range of motion without any significant pain brisk capillary refill distally sensation intact distally    Right ankle:   Skin warm, dry, intact.  Swelling noted to heal with some ecchymosis.  No tenderness to palpation along the posterior aspect of the heel or medial and lateral aspect.  Decreasing range of motion due to fracture.  Sensation intact.    Right hip:   Incision well healed with no ecchymosis or erythema.  No drainage or bleeding.  No signs of infection.  Internal and external rotation without any significant  pain.    X-rays:  Multiple views of right wrist reviewed.  Fracture site series anatomic alignment.  Two views of the right calcaneus reviewed.  Some interval callus noted with no significant shifting of fracture in comparison to previous x-rays.  Three views of the right hip reviewed.  Noting anatomic alignment with no loosening or subsidence of hardware.     Assessment::  Status post right total hip arthroplasty due to fracture; right distal radius fracture; right calcaneus fracture    Plan:  Patient presents to clinic today about 6 weeks status post traumatic injuries.  At this time right wrist fracture gone on to union.  He can remove his Exos splint begin to weightbear with full range of motion as tolerated.  Regards to the right ankle continues to have calcaneus fracture has been line comparison to his last x-rays.  Does continue to have some swelling and bruising.  As this has a normal occurrence after type of fracture.  He was educated to elevate and ice for comfort.  We will continue to allow him to weightbear as tolerated in the walking boot.  In regards to his right hip is stable on exam and x-rays.  We will have him begin physical therapy script was given to the patient today.  Surgical site is well healed.  Pain medication was sent to the patient.  I have him follow up in 2 months to reassess the right ankle for wrist and hip.  Patient states understanding and agrees with plan of care.    This note was created using [employer] voice recognition software that occasionally misinterpreted phrases or words.    Consult note is delivered via Epic messaging service.

## 2023-11-09 ENCOUNTER — OFFICE VISIT (OUTPATIENT)
Dept: NEUROSURGERY | Facility: CLINIC | Age: 48
End: 2023-11-09
Payer: MEDICAID

## 2023-11-09 VITALS
SYSTOLIC BLOOD PRESSURE: 114 MMHG | WEIGHT: 198 LBS | HEART RATE: 96 BPM | DIASTOLIC BLOOD PRESSURE: 82 MMHG | BODY MASS INDEX: 24.62 KG/M2 | RESPIRATION RATE: 16 BRPM | HEIGHT: 75 IN

## 2023-11-09 DIAGNOSIS — S32.032D: Primary | ICD-10-CM

## 2023-11-09 PROCEDURE — 3074F PR MOST RECENT SYSTOLIC BLOOD PRESSURE < 130 MM HG: ICD-10-PCS | Mod: CPTII,,, | Performed by: PHYSICIAN ASSISTANT

## 2023-11-09 PROCEDURE — 1159F PR MEDICATION LIST DOCUMENTED IN MEDICAL RECORD: ICD-10-PCS | Mod: CPTII,,, | Performed by: PHYSICIAN ASSISTANT

## 2023-11-09 PROCEDURE — 3079F DIAST BP 80-89 MM HG: CPT | Mod: CPTII,,, | Performed by: PHYSICIAN ASSISTANT

## 2023-11-09 PROCEDURE — 99024 POSTOP FOLLOW-UP VISIT: CPT | Mod: ,,, | Performed by: PHYSICIAN ASSISTANT

## 2023-11-09 PROCEDURE — 99024 PR POST-OP FOLLOW-UP VISIT: ICD-10-PCS | Mod: ,,, | Performed by: PHYSICIAN ASSISTANT

## 2023-11-09 PROCEDURE — 1159F MED LIST DOCD IN RCRD: CPT | Mod: CPTII,,, | Performed by: PHYSICIAN ASSISTANT

## 2023-11-09 PROCEDURE — 3079F PR MOST RECENT DIASTOLIC BLOOD PRESSURE 80-89 MM HG: ICD-10-PCS | Mod: CPTII,,, | Performed by: PHYSICIAN ASSISTANT

## 2023-11-09 PROCEDURE — 1160F PR REVIEW ALL MEDS BY PRESCRIBER/CLIN PHARMACIST DOCUMENTED: ICD-10-PCS | Mod: CPTII,,, | Performed by: PHYSICIAN ASSISTANT

## 2023-11-09 PROCEDURE — 1160F RVW MEDS BY RX/DR IN RCRD: CPT | Mod: CPTII,,, | Performed by: PHYSICIAN ASSISTANT

## 2023-11-09 PROCEDURE — 3074F SYST BP LT 130 MM HG: CPT | Mod: CPTII,,, | Performed by: PHYSICIAN ASSISTANT

## 2023-11-09 NOTE — PROGRESS NOTES
[facility]  History & Physical  Neurosurgery      José Henry   [id number]   1975       SUBJECTIVE:     CHIEF COMPLAINT:  Low back pain with pain at the right wrist and right heel      HPI:  José Henry is a 47 y.o. male who presents for a 6 weeks postoperative follow up appointment.  On 03/20/2023, the patient was seen in the emergency department at [facility] after he walked off of a 2nd story balcony.  He landed on his legs sustaining multiple fractures as well as L3 burst fracture.   On 09/27/2023, the patient underwent L2-3 laminotomy with L1 through L5 instrumented fusion with Dr. Diaz.     The patient presents today for follow-up with his sister.  He continues with lower back pain although improved since initially after surgery.  He is in a boot for the right calcaneus fracture.  He has had right total hip replacement.  His wrist is in a wrist splint.  He is nonweightbearing with the right lower extremity.  He knows his orthopedic has recommended starting physical therapy for the orthopedic injuries.      Past Medical History:   Diagnosis Date    Closed unstable burst fracture of third lumbar vertebra     GERD (gastroesophageal reflux disease)     Smoker        Past Surgical History:   Procedure Laterality Date    HIP ARTHROPLASTY Right 09/29/2023    Procedure: ARTHROPLASTY, HIP;  Surgeon: Tai Vallejo MD;  Location: [facility];  Service: Orthopedics;  Laterality: Right;  REQ. 12:30PM    POSTERIOR LUMBAR INTERBODY FUSION (PLIF) WITH COMPUTER-ASSISTED NAVIGATION N/A 09/27/2023    L2-3 laminectomies with L1-L5 instrumented fusion.  Dr. Diaz       Family History   Family history unknown: Yes       Social History     Socioeconomic History    Marital status:    Tobacco Use    Smoking status: Former     Types: Cigarettes    Smokeless tobacco: Former   Substance and Sexual Activity    Alcohol use: Not Currently     Alcohol/week: 6.0 standard drinks of alcohol     Types:  "6 Cans of beer per week     Comment: drinks 5x per week/ 6 or more beers    Drug use: Never    Sexual activity: Yes     Partners: Female       Review of patient's allergies indicates:  No Known Allergies     Current Outpatient Medications   Medication Instructions    HYDROcodone-acetaminophen (NORCO)  mg per tablet 1 tablet, Oral        Review of Systems   Constitutional:  Negative for chills and fever.   HENT:  Negative for nosebleeds and sore throat.    Eyes:  Negative for pain and visual disturbance.   Respiratory:  Negative for cough, chest tightness and shortness of breath.    Cardiovascular:  Negative for chest pain.   Gastrointestinal:  Negative for diarrhea, nausea and vomiting.   Genitourinary:  Negative for difficulty urinating, dysuria and hematuria.   Musculoskeletal:  Positive for back pain and gait problem. Negative for myalgias.   Skin:  Negative for rash.   Neurological:  Negative for dizziness, facial asymmetry and headaches.   Psychiatric/Behavioral:  Negative for confusion and sleep disturbance. The patient is not nervous/anxious.        OBJECTIVE:       Visit Vitals  /82 (BP Location: Left arm, Patient Position: Sitting)   Pulse 96   Resp 16   Ht 6' 3" (1.905 m)   Wt 89.8 kg (198 lb)   BMI 24.75 kg/m²        General:  Pleasant, Well-nourished, Well-groomed.    Lungs:  Breathing is quiet with non-labored respirations.    Musculoskeletal:   TLSO brace is in place.  Lumbar incision is well healed.    Neurological:  The patient is awake, alert, and oriented in all 4 spheres.  His memory, cognition, and affect are appropriate.  Speech is fluent.  He is moving all extremities relatively well with the exception of right wrist and right ankle.  He is using a scooter for the right lower extremity.      Imaging:  Lumbar x-rays were obtained on 11/07/2023.  This study shows stable position of the hardware.  Alignment is stable as well.      ASSESSMENT:     1. Closed unstable burst fracture of " third lumbar vertebra with routine healing, subsequent encounter  X-Ray Lumbar Spine 2 Or 3 Views        PLAN:     Overall, the patient is doing very well.  He will continue with his TLSO brace.  I had advised him it was okay for him to start therapy for his orthopedic injuries.  I asked that he not perform heavy lifting or bending.  He will return for follow-up in 6 weeks which will be 3 months postop.  X-rays will be obtained prior to that visit.      Bailey Greenwood PA-C      Disclaimer:  This note is prepared using voice recognition software and as such is likely to have errors despite attempts at proofreading.

## 2023-11-13 ENCOUNTER — EXTERNAL HOME HEALTH (OUTPATIENT)
Dept: HOME HEALTH SERVICES | Facility: HOSPITAL | Age: 48
End: 2023-11-13
Payer: MEDICAID

## 2023-12-28 ENCOUNTER — OFFICE VISIT (OUTPATIENT)
Dept: NEUROSURGERY | Facility: CLINIC | Age: 48
End: 2023-12-28
Payer: MEDICAID

## 2023-12-28 ENCOUNTER — HOSPITAL ENCOUNTER (OUTPATIENT)
Dept: RADIOLOGY | Facility: HOSPITAL | Age: 48
Discharge: HOME | End: 2023-12-28
Attending: PHYSICIAN ASSISTANT
Payer: MEDICAID

## 2023-12-28 VITALS
HEART RATE: 91 BPM | RESPIRATION RATE: 16 BRPM | HEIGHT: 75 IN | SYSTOLIC BLOOD PRESSURE: 133 MMHG | WEIGHT: 213 LBS | DIASTOLIC BLOOD PRESSURE: 85 MMHG | BODY MASS INDEX: 26.49 KG/M2

## 2023-12-28 DIAGNOSIS — S32.032D: Primary | ICD-10-CM

## 2023-12-28 DIAGNOSIS — Z98.1 STATUS POST LUMBAR SPINAL FUSION: ICD-10-CM

## 2023-12-28 DIAGNOSIS — S32.032D: ICD-10-CM

## 2023-12-28 PROCEDURE — 99213 PR OFFICE/OUTPT VISIT, EST, LEVL III, 20-29 MIN: ICD-10-PCS | Mod: ,,, | Performed by: PHYSICIAN ASSISTANT

## 2023-12-28 PROCEDURE — 72100 X-RAY EXAM L-S SPINE 2/3 VWS: CPT | Mod: TC

## 2023-12-28 PROCEDURE — 3079F PR MOST RECENT DIASTOLIC BLOOD PRESSURE 80-89 MM HG: ICD-10-PCS | Mod: CPTII,,, | Performed by: PHYSICIAN ASSISTANT

## 2023-12-28 PROCEDURE — 3075F SYST BP GE 130 - 139MM HG: CPT | Mod: CPTII,,, | Performed by: PHYSICIAN ASSISTANT

## 2023-12-28 PROCEDURE — 1159F PR MEDICATION LIST DOCUMENTED IN MEDICAL RECORD: ICD-10-PCS | Mod: CPTII,,, | Performed by: PHYSICIAN ASSISTANT

## 2023-12-28 PROCEDURE — 99213 OFFICE O/P EST LOW 20 MIN: CPT | Mod: ,,, | Performed by: PHYSICIAN ASSISTANT

## 2023-12-28 PROCEDURE — 3008F BODY MASS INDEX DOCD: CPT | Mod: CPTII,,, | Performed by: PHYSICIAN ASSISTANT

## 2023-12-28 PROCEDURE — 1160F PR REVIEW ALL MEDS BY PRESCRIBER/CLIN PHARMACIST DOCUMENTED: ICD-10-PCS | Mod: CPTII,,, | Performed by: PHYSICIAN ASSISTANT

## 2023-12-28 PROCEDURE — 1160F RVW MEDS BY RX/DR IN RCRD: CPT | Mod: CPTII,,, | Performed by: PHYSICIAN ASSISTANT

## 2023-12-28 PROCEDURE — 3008F PR BODY MASS INDEX (BMI) DOCUMENTED: ICD-10-PCS | Mod: CPTII,,, | Performed by: PHYSICIAN ASSISTANT

## 2023-12-28 PROCEDURE — 3079F DIAST BP 80-89 MM HG: CPT | Mod: CPTII,,, | Performed by: PHYSICIAN ASSISTANT

## 2023-12-28 PROCEDURE — 1159F MED LIST DOCD IN RCRD: CPT | Mod: CPTII,,, | Performed by: PHYSICIAN ASSISTANT

## 2023-12-28 PROCEDURE — 3075F PR MOST RECENT SYSTOLIC BLOOD PRESS GE 130-139MM HG: ICD-10-PCS | Mod: CPTII,,, | Performed by: PHYSICIAN ASSISTANT

## 2023-12-28 NOTE — PROGRESS NOTES
[facility]  History & Physical  Neurosurgery      José Henry   [id number]   1975       SUBJECTIVE:     CHIEF COMPLAINT:  Right heel pain        HPI:  José Henry is a 48 y.o. male who presents for a 3 month postoperative follow up appointment.    On 9/23/2023, the patient was seen in the emergency department at [facility] after he walked off of a 2nd story balcony.  He landed on his legs sustaining multiple fractures as well as L3 burst fracture.   On 09/27/2023, the patient underwent L2-3 laminotomy with L1 through L5 instrumented fusion with Dr. Diaz.  Gerhard in the hospital from 09/23/2023 through 10/16/2023.  He was seen in the office by myself on 11/09/2023.  There were plans at that time for him to start physical therapy for the orthopedic injuries.    Overall, he is doing very well in regards to the lower back and right hip replacement.  He denies pain in the lower back.  He continues with pain from the right calcaneus fracture.  He was in a walking boot today.  He notes he was able to put 50% weight on the right foot.  He has continued with physical therapy.  He finds it is going well.      Past Medical History:   Diagnosis Date    Closed unstable burst fracture of third lumbar vertebra     GERD (gastroesophageal reflux disease)     Smoker        Past Surgical History:   Procedure Laterality Date    HIP ARTHROPLASTY Right 09/29/2023    Procedure: ARTHROPLASTY, HIP;  Surgeon: Tai Vallejo MD;  Location: [facility];  Service: Orthopedics;  Laterality: Right;  REQ. 12:30PM    POSTERIOR LUMBAR INTERBODY FUSION (PLIF) WITH COMPUTER-ASSISTED NAVIGATION N/A 09/27/2023    L2-3 laminectomies with L1-L5 instrumented fusion.  Dr. Diaz       Family History   Family history unknown: Yes       Social History     Socioeconomic History    Marital status:    Tobacco Use    Smoking status: Former     Types: Cigarettes    Smokeless tobacco: Former   Substance and Sexual Activity     "Alcohol use: Not Currently     Alcohol/week: 6.0 standard drinks of alcohol     Types: 6 Cans of beer per week     Comment: drinks 5x per week/ 6 or more beers    Drug use: Never    Sexual activity: Yes     Partners: Female       Review of patient's allergies indicates:  No Known Allergies     Current Outpatient Medications   Medication Instructions    HYDROcodone-acetaminophen (NORCO)  mg per tablet 1 tablet, Oral        Review of Systems   Constitutional:  Negative for chills and fever.   HENT:  Negative for nosebleeds and sore throat.    Eyes:  Negative for pain and visual disturbance.   Respiratory:  Negative for cough, chest tightness and shortness of breath.    Cardiovascular:  Negative for chest pain.   Gastrointestinal:  Negative for diarrhea, nausea and vomiting.   Genitourinary:  Negative for difficulty urinating, dysuria and hematuria.   Musculoskeletal:  Positive for back pain and gait problem. Negative for myalgias.   Skin:  Negative for rash.   Neurological:  Negative for dizziness, facial asymmetry and headaches.   Psychiatric/Behavioral:  Negative for confusion and sleep disturbance. The patient is not nervous/anxious.        OBJECTIVE:       Visit Vitals  /85 (BP Location: Right arm, Patient Position: Sitting)   Pulse 91   Resp 16   Ht 6' 3" (1.905 m)   Wt 96.6 kg (213 lb)   BMI 26.62 kg/m²        General:  Pleasant, Well-nourished, Well-groomed.    Lungs:  Breathing is quiet with non-labored respirations.    Musculoskeletal:   Lumbar incision is well healed.    Neurological:  The patient is awake, alert, and oriented in all 4 spheres.  His memory, cognition, and affect are appropriate.  Speech is fluent.  He moves all extremities well with the exception of the right ankle which is in a boot.  He is using crutches.      Imaging:  Three views of the lumbar spine were obtained today, 12/28/2023.  This study shows L3 burst fracture to be healed.  The hardware is in good, stable position " compared to prior x-rays from 11/07/2023.      ASSESSMENT:     1. Closed unstable burst fracture of third lumbar vertebra with routine healing, subsequent encounter        2. Status post lumbar spinal fusion          PLAN:     Overall, the patient was doing very well in regards to his lumbar spine.  We discussed a gradual increase in his level of activity.  He is instructed to wean out of his brace.  He voiced understanding.  He will return for follow-up on an as-needed basis.      A total of 7 minutes was spent face-to-face with the patient during this encounter.  Over half of that time was spent on counseling and coordination of care.  An additional 14 minutes were used to review the patient's chart including lumbar x-ray images, compare with old lumbar x-rays, and work on office note.      Bailey Greenwood PA-C      Disclaimer:  This note is prepared using voice recognition software and as such is likely to have errors despite attempts at proofreading.

## 2024-01-09 ENCOUNTER — HOSPITAL ENCOUNTER (OUTPATIENT)
Dept: RADIOLOGY | Facility: CLINIC | Age: 49
Discharge: HOME | End: 2024-01-09
Attending: NURSE PRACTITIONER
Payer: MEDICAID

## 2024-01-09 ENCOUNTER — OFFICE VISIT (OUTPATIENT)
Dept: ORTHOPEDICS | Facility: CLINIC | Age: 49
End: 2024-01-09
Payer: MEDICAID

## 2024-01-09 VITALS
BODY MASS INDEX: 26.62 KG/M2 | SYSTOLIC BLOOD PRESSURE: 147 MMHG | HEIGHT: 75 IN | HEART RATE: 104 BPM | DIASTOLIC BLOOD PRESSURE: 106 MMHG

## 2024-01-09 DIAGNOSIS — S52.501A CLOSED FRACTURE OF DISTAL END OF RIGHT RADIUS, UNSPECIFIED FRACTURE MORPHOLOGY, INITIAL ENCOUNTER: ICD-10-CM

## 2024-01-09 DIAGNOSIS — Z47.1 AFTERCARE FOLLOWING RIGHT HIP JOINT REPLACEMENT SURGERY: ICD-10-CM

## 2024-01-09 DIAGNOSIS — Z47.1 AFTERCARE FOLLOWING RIGHT HIP JOINT REPLACEMENT SURGERY: Primary | ICD-10-CM

## 2024-01-09 DIAGNOSIS — S92.001A CLOSED NONDISPLACED FRACTURE OF RIGHT CALCANEUS, UNSPECIFIED PORTION OF CALCANEUS, INITIAL ENCOUNTER: ICD-10-CM

## 2024-01-09 DIAGNOSIS — Z96.641 AFTERCARE FOLLOWING RIGHT HIP JOINT REPLACEMENT SURGERY: Primary | ICD-10-CM

## 2024-01-09 DIAGNOSIS — Z96.641 AFTERCARE FOLLOWING RIGHT HIP JOINT REPLACEMENT SURGERY: ICD-10-CM

## 2024-01-09 PROCEDURE — 3077F SYST BP >= 140 MM HG: CPT | Mod: CPTII,,, | Performed by: ORTHOPAEDIC SURGERY

## 2024-01-09 PROCEDURE — 73650 X-RAY EXAM OF HEEL: CPT | Mod: RT,,, | Performed by: NURSE PRACTITIONER

## 2024-01-09 PROCEDURE — 1159F MED LIST DOCD IN RCRD: CPT | Mod: CPTII,,, | Performed by: ORTHOPAEDIC SURGERY

## 2024-01-09 PROCEDURE — 73100 X-RAY EXAM OF WRIST: CPT | Mod: RT,,, | Performed by: NURSE PRACTITIONER

## 2024-01-09 PROCEDURE — 3008F BODY MASS INDEX DOCD: CPT | Mod: CPTII,,, | Performed by: ORTHOPAEDIC SURGERY

## 2024-01-09 PROCEDURE — 73502 X-RAY EXAM HIP UNI 2-3 VIEWS: CPT | Mod: RT,,, | Performed by: NURSE PRACTITIONER

## 2024-01-09 PROCEDURE — 99213 OFFICE O/P EST LOW 20 MIN: CPT | Mod: ,,, | Performed by: ORTHOPAEDIC SURGERY

## 2024-01-09 PROCEDURE — 3080F DIAST BP >= 90 MM HG: CPT | Mod: CPTII,,, | Performed by: ORTHOPAEDIC SURGERY

## 2024-01-10 NOTE — PROGRESS NOTES
Chief Complaint:   Chief Complaint   Patient presents with    Follow-up     Right JESSIE sx 9/29/23 OOGL,no issues with pain in his right hip states its doing really great, Right calcaneus and right wrist F/U too, states his ankle is really numb its gotten worse since last visit       History of present illness:  48-year-old male presents today status post total hip arthroplasty on the right side, nonoperative treated right calcaneus, nonoperative treated right distal radius fracture.  Overall he is doing fairly well.  Complaining mainly of pain and numbness to the right foot.    Past Medical History:   Diagnosis Date    Closed unstable burst fracture of third lumbar vertebra     GERD (gastroesophageal reflux disease)     Smoker        Past Surgical History:   Procedure Laterality Date    HIP ARTHROPLASTY Right 09/29/2023    Procedure: ARTHROPLASTY, HIP;  Surgeon: Tai Vallejo MD;  Location: [facility];  Service: Orthopedics;  Laterality: Right;  REQ. 12:30PM    POSTERIOR LUMBAR INTERBODY FUSION (PLIF) WITH COMPUTER-ASSISTED NAVIGATION N/A 09/27/2023    L2-3 laminectomies with L1-L5 instrumented fusion.  Dr. Diaz       Current Outpatient Medications   Medication Sig    HYDROcodone-acetaminophen (NORCO)  mg per tablet Take 1 tablet by mouth.     No current facility-administered medications for this visit.       Review of patient's allergies indicates:  No Known Allergies    Family History   Family history unknown: Yes       Social History     Socioeconomic History    Marital status:    Tobacco Use    Smoking status: Former     Types: Cigarettes    Smokeless tobacco: Former   Substance and Sexual Activity    Alcohol use: Not Currently     Alcohol/week: 6.0 standard drinks of alcohol     Types: 6 Cans of beer per week     Comment: drinks 5x per week/ 6 or more beers    Drug use: Never    Sexual activity: Yes     Partners: Female           Review of Systems:    Constitution: Negative for chills, fever, and  sweats.  Negative for unexplained weight loss.    HENT:  Negative for headaches and blurry vision.    Cardiovascular:Negative for chest pain or irregular heart beat. Negative for hypertension.    Respiratory:  Negative for cough and shortness of breath.    Gastrointestinal: Negative for abdominal pain, heartburn, melena, nausea, and vomitting.    Genitourinary:  Negative bladder incontinence and dysuria.    Musculoskeletal:  See HPI    Neurological: Negative for numbness.    Psychiatric/Behavioral: Negative for depression.  The patient is not nervous/anxious.      Endocrine: Negative for polyuria    Hematologic/Lymphatic: Negative for bleeding problem.  Does not bruise/bleed easily.    Skin: Negative for poor would healing and rash      Physical Examination:    Vital Signs:    Vitals:    01/09/24 1430   BP: (!) 147/106   Pulse: 104       Body mass index is 26.62 kg/m².    General: No acute distress, alert and oriented, healthy appearing    HEENT: Head is atraumatic, mucous membranes are moist    Neck: Supples, no JVD    Cardiovascular: Palpable dorsalis pedis and posterior tibial pulses, regular rate and rhythm to those pulses    Lungs: Breathing non-labored    Skin: no rashes appreciated    Neurologic: Can flex and extend knees, ankles, and toes. Sensation is grossly intact    Right hip:  Range of motion of the right hip without significant pain or discomfort.  Incision clean and dry.  Right foot:  Brisk cap refill distally.  Sensation intact distally.  Patient with a flattened foot and evidence of calcaneus malunion.  Right wrist:  Brisk cap refill this appeared states that disappeared range of motion of the wrist is full.  Sensation intact distally.    X-rays:  Three views of the right hip reviewed.  Patient's implants well fixed.  No signs of loosening or subsidence noted.  Three views of the right wrist reviewed.  Patient's fracture is healed well.  No displacement.  Three views of the right foot reviewed.   Patient's calcaneus fracture is healed.  He has a flattened hindfoot.  Posttraumatic arthritis noted subtalar joint     Assessment::  Status post right total hip arthroplasty  Nonoperative treated right calcaneus fracture  Non op right distal radius fracture    Plan:  Discussed all treatment options the patient.  Overall he is doing well with regards to right hip.  It was right wrist is also doing well.  He is having some issues with the calcaneus, most of this is due to likely underlying nerve entrapment.  He is going to try some topical anti-inflammatories.  We will plan to see him back in a few months.  No new x-rays knee returned.    This note was created using [employer] voice recognition software that occasionally misinterpreted phrases or words.    Consult note is delivered via Epic messaging service. Lio     Patient presenting with chest tightness. PERC score of 1. Low risk of PE. Will do X-ray and reassess.

## 2024-04-09 ENCOUNTER — OFFICE VISIT (OUTPATIENT)
Dept: ORTHOPEDICS | Facility: CLINIC | Age: 49
End: 2024-04-09
Payer: MEDICAID

## 2024-04-09 VITALS
SYSTOLIC BLOOD PRESSURE: 133 MMHG | HEART RATE: 69 BPM | BODY MASS INDEX: 28.23 KG/M2 | WEIGHT: 227 LBS | HEIGHT: 75 IN | DIASTOLIC BLOOD PRESSURE: 91 MMHG

## 2024-04-09 DIAGNOSIS — Z96.641 AFTERCARE FOLLOWING RIGHT HIP JOINT REPLACEMENT SURGERY: Primary | ICD-10-CM

## 2024-04-09 DIAGNOSIS — Z47.1 AFTERCARE FOLLOWING RIGHT HIP JOINT REPLACEMENT SURGERY: Primary | ICD-10-CM

## 2024-04-09 DIAGNOSIS — M19.071 OSTEOARTHRITIS OF SUBTALAR JOINT, RIGHT: ICD-10-CM

## 2024-04-09 PROCEDURE — 3008F BODY MASS INDEX DOCD: CPT | Mod: CPTII,,, | Performed by: ORTHOPAEDIC SURGERY

## 2024-04-09 PROCEDURE — 99213 OFFICE O/P EST LOW 20 MIN: CPT | Mod: ,,, | Performed by: ORTHOPAEDIC SURGERY

## 2024-04-09 PROCEDURE — 1159F MED LIST DOCD IN RCRD: CPT | Mod: CPTII,,, | Performed by: ORTHOPAEDIC SURGERY

## 2024-04-09 PROCEDURE — 3080F DIAST BP >= 90 MM HG: CPT | Mod: CPTII,,, | Performed by: ORTHOPAEDIC SURGERY

## 2024-04-09 PROCEDURE — 3075F SYST BP GE 130 - 139MM HG: CPT | Mod: CPTII,,, | Performed by: ORTHOPAEDIC SURGERY

## 2024-04-09 RX ORDER — IBUPROFEN 200 MG
200 TABLET ORAL EVERY 6 HOURS PRN
COMMUNITY

## 2024-04-09 NOTE — PROGRESS NOTES
Chief Complaint:   Chief Complaint   Patient presents with    Right Foot - Follow-up     3mons out Rt foot 9/29/23 Patient states his Rt foot PT wants to see if there is a way his would be able to be corrected. Every thing else with his is ok but he walks sideways on his ankle and it can give out on him some times. What is his next options. He has pain with his ankle when he walks all the time and its swollen all the time. He does have a figure 8 sleeve on his ankle for support.        History of present illness:  48-year-old male presents today for evaluation follow-up of total hip arthroplasty on the right side.  Overall doing well.  Pain is well-controlled.  Happy with his recovery with regards to the right hip.  Main issue is actually his calcaneus.  Continues to have issues with the foot.  Had nonoperative treatment of the right calcaneus.    Past Medical History:   Diagnosis Date    Closed unstable burst fracture of third lumbar vertebra     GERD (gastroesophageal reflux disease)     Smoker        Past Surgical History:   Procedure Laterality Date    HIP ARTHROPLASTY Right 09/29/2023    Procedure: ARTHROPLASTY, HIP;  Surgeon: Tai Vallejo MD;  Location: [facility];  Service: Orthopedics;  Laterality: Right;  REQ. 12:30PM    POSTERIOR LUMBAR INTERBODY FUSION (PLIF) WITH COMPUTER-ASSISTED NAVIGATION N/A 09/27/2023    L2-3 laminectomies with L1-L5 instrumented fusion.  Dr. Diaz       Current Outpatient Medications   Medication Sig    ibuprofen (ADVIL,MOTRIN) 200 MG tablet Take 200 mg by mouth every 6 (six) hours as needed for Pain.    HYDROcodone-acetaminophen (NORCO)  mg per tablet Take 1 tablet by mouth. (Patient not taking: Reported on 4/9/2024)     No current facility-administered medications for this visit.       Review of patient's allergies indicates:  No Known Allergies    Family History   Family history unknown: Yes       Social History     Socioeconomic History    Marital status:     Tobacco Use    Smoking status: Former     Types: Cigarettes    Smokeless tobacco: Former   Substance and Sexual Activity    Alcohol use: Not Currently     Alcohol/week: 6.0 standard drinks of alcohol     Types: 6 Cans of beer per week     Comment: drinks 5x per week/ 6 or more beers    Drug use: Never    Sexual activity: Yes     Partners: Female           Review of Systems:    Constitution: Negative for chills, fever, and sweats.  Negative for unexplained weight loss.    HENT:  Negative for headaches and blurry vision.    Cardiovascular:Negative for chest pain or irregular heart beat. Negative for hypertension.    Respiratory:  Negative for cough and shortness of breath.    Gastrointestinal: Negative for abdominal pain, heartburn, melena, nausea, and vomitting.    Genitourinary:  Negative bladder incontinence and dysuria.    Musculoskeletal:  See HPI    Neurological: Negative for numbness.    Psychiatric/Behavioral: Negative for depression.  The patient is not nervous/anxious.      Endocrine: Negative for polyuria    Hematologic/Lymphatic: Negative for bleeding problem.  Does not bruise/bleed easily.    Skin: Negative for poor would healing and rash      Physical Examination:    Vital Signs:    Vitals:    04/09/24 1316   BP: (!) 133/91   Pulse: 69       Body mass index is 28.37 kg/m².    General: No acute distress, alert and oriented, healthy appearing    HEENT: Head is atraumatic, mucous membranes are moist    Neck: Supples, no JVD    Cardiovascular: Palpable dorsalis pedis and posterior tibial pulses, regular rate and rhythm to those pulses    Lungs: Breathing non-labored    Skin: no rashes appreciated    Neurologic: Can flex and extend knees, ankles, and toes. Sensation is grossly intact    Right hip:  Range of motion right hip without significant discomfort.  Brisk cap refill disappeared sensation intact distally.  Right ankle:  No circumduction of the right ankle due to pain.  Good dorsiflexion plantar  flexion    X-rays:      Assessment::  Status post total hip arthroplasty  Posttraumatic arthritis right subtalar joint    Plan:  Discussed all treatment with the patient.  It was doing well with regards to right hip.  See him back in 6 months with a repeat x-rays of the right hip.  With regards to his right ankle/calcaneus fracture, it was gone onto develop posttraumatic arthritis of the right ankle which is not surprising given his fracture.  We discussed this today.  I would like to refer him for possible reconstruction with the foot and ankle surgeon.    This note was created using [employer] voice recognition software that occasionally misinterpreted phrases or words.    Consult note is delivered via Epic messaging service.

## 2024-09-04 PROBLEM — M19.279: Status: ACTIVE | Noted: 2024-09-04

## 2024-09-04 PROBLEM — M19.90: Status: ACTIVE | Noted: 2024-09-04

## 2024-10-09 ENCOUNTER — TELEPHONE (OUTPATIENT)
Dept: ORTHOPEDICS | Facility: CLINIC | Age: 49
End: 2024-10-09
Payer: MEDICAID

## 2024-10-09 NOTE — TELEPHONE ENCOUNTER
Called patient to reschedule appointment but no answer. Left to call back at his convince.    Called again and left message.   Called patient to rescheduled apponitment but no answer left message to call back.

## 2024-10-10 ENCOUNTER — TELEPHONE (OUTPATIENT)
Dept: ORTHOPEDICS | Facility: CLINIC | Age: 49
End: 2024-10-10

## 2024-10-18 ENCOUNTER — TELEPHONE (OUTPATIENT)
Dept: ORTHOPEDICS | Facility: CLINIC | Age: 49
End: 2024-10-18
Payer: MEDICAID

## 2024-10-18 NOTE — TELEPHONE ENCOUNTER
Called patient to get him rescheduled for past appointment on 10/10/24. No answer left message to call back.

## 2024-12-13 ENCOUNTER — OFFICE VISIT (OUTPATIENT)
Dept: ORTHOPEDICS | Facility: CLINIC | Age: 49
End: 2024-12-13
Payer: MEDICAID

## 2024-12-13 ENCOUNTER — HOSPITAL ENCOUNTER (OUTPATIENT)
Dept: RADIOLOGY | Facility: CLINIC | Age: 49
Discharge: HOME | End: 2024-12-13
Attending: NURSE PRACTITIONER
Payer: MEDICAID

## 2024-12-13 VITALS
BODY MASS INDEX: 27.21 KG/M2 | HEART RATE: 81 BPM | WEIGHT: 212 LBS | SYSTOLIC BLOOD PRESSURE: 136 MMHG | HEIGHT: 74 IN | DIASTOLIC BLOOD PRESSURE: 89 MMHG

## 2024-12-13 DIAGNOSIS — Z47.1 AFTERCARE FOLLOWING RIGHT HIP JOINT REPLACEMENT SURGERY: ICD-10-CM

## 2024-12-13 DIAGNOSIS — Z96.641 AFTERCARE FOLLOWING RIGHT HIP JOINT REPLACEMENT SURGERY: ICD-10-CM

## 2024-12-13 DIAGNOSIS — Z47.1 AFTERCARE FOLLOWING RIGHT HIP JOINT REPLACEMENT SURGERY: Primary | ICD-10-CM

## 2024-12-13 DIAGNOSIS — Z96.641 AFTERCARE FOLLOWING RIGHT HIP JOINT REPLACEMENT SURGERY: Primary | ICD-10-CM

## 2024-12-13 PROCEDURE — 73502 X-RAY EXAM HIP UNI 2-3 VIEWS: CPT | Mod: RT,,, | Performed by: NURSE PRACTITIONER

## 2024-12-13 NOTE — PROGRESS NOTES
Chief Complaint:   Chief Complaint   Patient presents with    Right Hip - Follow-up     Right hip f/u patient states hip is coming long good        History of present illness: José Henry is a 49 y.o. male, presents to the clinic today in regards to his right hip. He is 1 year out of a total hip arthroplasty due to fracture. Overall he is doing well. Denies any pain or discomfort to the right hip. Back to everyday activities without any complications. States that he did have his calcaneous fusion done about 2 months ago. Has done very well with this.       Past Medical History:   Diagnosis Date    Closed unstable burst fracture of third lumbar vertebra     GERD (gastroesophageal reflux disease)     Smoker        Past Surgical History:   Procedure Laterality Date    FUSION OF SUBTALAR JOINT Right 9/3/2024    Procedure: right subtalar distraction arthrodesis with allograft wedge and paragon distraction system, lateral wall exostectomy;  Surgeon: Brayden Melvin MD;  Location: [facility];  Service: Orthopedics;  Laterality: Right;  6 am start    HIP ARTHROPLASTY Right 09/29/2023    Procedure: ARTHROPLASTY, HIP;  Surgeon: Tai Vallejo MD;  Location: [facility];  Service: Orthopedics;  Laterality: Right;  REQ. 12:30PM    POSTERIOR LUMBAR INTERBODY FUSION (PLIF) WITH COMPUTER-ASSISTED NAVIGATION N/A 09/27/2023    L2-3 laminectomies with L1-L5 instrumented fusion.  Dr. Diaz       Current Outpatient Medications   Medication Sig    aspirin (ECOTRIN) 81 MG EC tablet Take 1 tablet (81 mg total) by mouth 2 (two) times a day.    dextroamphetamine-amphetamine (ADDERALL) 20 mg tablet  (Patient not taking: Reported on 10/11/2024)    ibuprofen (ADVIL,MOTRIN) 200 MG tablet Take 200 mg by mouth every 6 (six) hours as needed for Pain. (Patient not taking: Reported on 12/13/2024)     No current facility-administered medications for this visit.       Review of patient's allergies indicates:  No Known Allergies    Family History    Problem Relation Name Age of Onset    No Known Problems Mother      No Known Problems Father         Social History     Socioeconomic History    Marital status:    Tobacco Use    Smoking status: Former     Types: Cigarettes    Smokeless tobacco: Never   Substance and Sexual Activity    Alcohol use: Yes     Alcohol/week: 6.0 standard drinks of alcohol     Types: 6 Cans of beer per week     Comment: drinks 5x per week/ 6 or more beers    Drug use: Yes     Types: Marijuana     Comment: occ    Sexual activity: Not Currently     Partners: Female           Review of Systems:    Denies fevers, chills, chest pain, shortness of breath. Comprehensive review of systems performed and otherwise negative except as noted in HPI      Physical Examination:    General: awake and alert, no acute distress, healthy appearing  Head and Neck: Head atraumatic/normocephalic. Moist MM  CV: brisk cap refill  Lungs: non-labored breathing, w/o cough or SOB  Skin: no rashes present, warm to touch  Neuro: sensation grossly intact distall     Vital Signs:    Vitals:    12/13/24 0824   BP: 136/89   Pulse: 81       Body mass index is 27.22 kg/m².    Right hip exam:    Right hip incision clean dry and intact. No erythema, drainage, or signs of infection  No swelling distally. No signs of DVT  Brisk cap refill right foot  Sensation intact distally to right foot  Positive FHL/EHL/gastrocsoleus/tib ant    X-rays: 3 views of the right hip reviewed. Patient's implants appear well fixed. No signs of loosening or subsidence noted.     Assessment::post op status post R JESSIE    Plan:  Patient is 1 year s/p R JESSIE. His hip is stable upon exam and Xrays today in clinic. He is to continue is at home exercises. No restrictions. Educated to call if having any issues with his right hip. Patient states understanding and agrees with plan of care.     This note was created using [employer] voice recognition software that occasionally misinterpreted phrases or  words.    Consult note is delivered via Epic messaging service.

## (undated) DEVICE — GLOVE PROTEXIS PI SYN SURG 6.5

## (undated) DEVICE — NDL ANES SPINAL 18X3.5ST 18G

## (undated) DEVICE — DRAPE SURG W/TWL 17 5/8X23

## (undated) DEVICE — SUT BLU BR 2 TAPERD NDL 1/2

## (undated) DEVICE — TAPE ADH MEDIPORE 4 X 10YDS

## (undated) DEVICE — CONTAINER SPECIMEN SCREW 4OZ

## (undated) DEVICE — SPONGE LAP XRAY STERILE 18X18

## (undated) DEVICE — SOL NACL IRR 1000ML BTL

## (undated) DEVICE — TRAY SKIN SCRUB WET PREMIUM

## (undated) DEVICE — DEVICE CLSR PDS 0 CT-1 12IN

## (undated) DEVICE — SUT MCRYL PLUS 2-0 CT-1 36IN

## (undated) DEVICE — KIT TOTAL HIP HLGC

## (undated) DEVICE — GLOVE PROTEXIS BLUE LATEX 8.5

## (undated) DEVICE — COVER HD BACK TABLE 6FT

## (undated) DEVICE — PAD DERMAPROX XL 22X14X.5IN

## (undated) DEVICE — SUT VICRYL 2-0 36 CT-1

## (undated) DEVICE — DRESSING XEROFORM 1X8IN

## (undated) DEVICE — KIT SURGICAL TURNOVER

## (undated) DEVICE — DRAPE ORTH SPLIT 77X108IN

## (undated) DEVICE — SPONGE PATTY NEURO SGL 0.5X6

## (undated) DEVICE — HOOD FLYTE SURGICOOL

## (undated) DEVICE — RETRIEVER SUTURE HEWSON DISP

## (undated) DEVICE — KIT SURGIFLO HEMOSTATIC MATRIX

## (undated) DEVICE — TRAY CATH FOL SIL URIMTR 16FR

## (undated) DEVICE — TUBE SUCTION MEDI-VAC STERILE

## (undated) DEVICE — KIT DRAIN WOUND RND SPRNG RESV

## (undated) DEVICE — GAUZE SPONGE 4X4 12PLY

## (undated) DEVICE — GLOVE PROTEXIS PI SYN SURG 7

## (undated) DEVICE — DRAPE C-ARMOR EQUIPMENT COVER

## (undated) DEVICE — DRESSING MEPORE ADH 3.5X12

## (undated) DEVICE — SHEET XLGE DRAPE

## (undated) DEVICE — DRESSING XEROFORM NONADH 1X8IN

## (undated) DEVICE — GOWN X-LG STERILE BACK

## (undated) DEVICE — PILLOW HEAD REST

## (undated) DEVICE — SUTURE VIC PL ANTI VIL 8-18 CT

## (undated) DEVICE — SPONGE SURGIFOAM 100 8.5X12X10

## (undated) DEVICE — GLOVE PROTEXIS HYDROGEL SZ8.5

## (undated) DEVICE — TAPE CLOTH SOFT MEDIPORE 4IN

## (undated) DEVICE — Device

## (undated) DEVICE — TAPE MEDIPORE 3 X 10YD

## (undated) DEVICE — STAPLER SKIN PROXIMATE WIDE

## (undated) DEVICE — GOWN SURGICAL XX LARGE X LONG

## (undated) DEVICE — GLOVE 7.0 PROTEXIS PI BLUE

## (undated) DEVICE — SPONGE GAUZE 16PLY 4X4

## (undated) DEVICE — MARKER WRITESITE SKIN CHLRAPRP

## (undated) DEVICE — KIT POS JACKSON TABLE NO HDRST

## (undated) DEVICE — BENZOIN TINCTURE 0.66ML

## (undated) DEVICE — SPHERE NDI PASSIVE

## (undated) DEVICE — COVER STERILE-Z BACK TABLE XL

## (undated) DEVICE — BUR BONE CUT MICRO TPS 3X3.8MM

## (undated) DEVICE — APPLICATOR CHLORAPREP ORN 26ML

## (undated) DEVICE — DRAPE C-ARM COVER EZ 36X28IN

## (undated) DEVICE — SUT VICRYL PLUS 0 CT-1 18IN

## (undated) DEVICE — DRAPE TOP 53X102IN

## (undated) DEVICE — PAD ABD 8X10 STERILE

## (undated) DEVICE — DRAPE INCISE IOBAN 2 23X23IN

## (undated) DEVICE — DRAPE STERI U-SHAPED 47X51IN

## (undated) DEVICE — BLADE EZ CLEAN 2 1/2

## (undated) DEVICE — DRESSING XEROFORM FOIL PK 1X8

## (undated) DEVICE — SOL NACL IRR 3000ML

## (undated) DEVICE — PROBE BALL TIP 2.3MM

## (undated) DEVICE — NDL HYPO 22GX1 1/2 SYR 10ML LL

## (undated) DEVICE — GLOVE PROTEXIS HYDROGEL SZ6.5

## (undated) DEVICE — COVER MAYO STND XL 30X57IN

## (undated) DEVICE — SUT VICRYL 2 0 CT 2

## (undated) DEVICE — BLADE SAG DUAL CUT 18X90X1.35

## (undated) DEVICE — ELECTRODE PATIENT RETURN DISP

## (undated) DEVICE — DISH PETRI MED 3.5IN

## (undated) DEVICE — ELECTRODE BLADE E-Z CLEAN 4IN